# Patient Record
Sex: MALE | Race: WHITE | NOT HISPANIC OR LATINO | Employment: FULL TIME | URBAN - METROPOLITAN AREA
[De-identification: names, ages, dates, MRNs, and addresses within clinical notes are randomized per-mention and may not be internally consistent; named-entity substitution may affect disease eponyms.]

---

## 2018-04-24 PROBLEM — M51.9 LUMBAR DISC DISEASE: Status: ACTIVE | Noted: 2018-04-24

## 2018-04-24 PROBLEM — M50.90 CERVICAL DISC DISEASE: Status: ACTIVE | Noted: 2018-04-24

## 2018-04-24 PROBLEM — I10 ESSENTIAL HYPERTENSION: Status: ACTIVE | Noted: 2018-04-24

## 2019-12-02 PROBLEM — L72.3 SEBACEOUS CYST: Status: ACTIVE | Noted: 2019-12-02

## 2021-09-30 PROBLEM — F41.9 ANXIETY: Status: ACTIVE | Noted: 2021-09-30

## 2022-10-28 ENCOUNTER — TELEPHONE (OUTPATIENT)
Dept: PAIN MEDICINE | Facility: CLINIC | Age: 42
End: 2022-10-28
Payer: COMMERCIAL

## 2022-10-28 NOTE — TELEPHONE ENCOUNTER
Asked if pt wanted to schedule new pt appt from referral to see Dr. Bartholomew. Pt stated he did and appointment was made. All questions answered.

## 2022-10-28 NOTE — TELEPHONE ENCOUNTER
----- Message from Ginger Webster sent at 10/28/2022  9:35 AM CDT -----  Contact: called at [phone]  Type: Needs Medical Advice  Who Called: Nurse Juarez from the [facility]  Best Call Back Number: [phone]  Additional Information: She is calling to make an appt for the pt but couldn't get anyone on the line. Please call pt back to make an appt.

## 2022-12-06 ENCOUNTER — OFFICE VISIT (OUTPATIENT)
Dept: PAIN MEDICINE | Facility: CLINIC | Age: 42
End: 2022-12-06
Payer: COMMERCIAL

## 2022-12-06 VITALS
WEIGHT: 291.56 LBS | HEART RATE: 90 BPM | BODY MASS INDEX: 37.42 KG/M2 | DIASTOLIC BLOOD PRESSURE: 84 MMHG | HEIGHT: 74 IN | SYSTOLIC BLOOD PRESSURE: 121 MMHG

## 2022-12-06 DIAGNOSIS — M48.061 SPINAL STENOSIS OF LUMBAR REGION WITHOUT NEUROGENIC CLAUDICATION: Primary | ICD-10-CM

## 2022-12-06 DIAGNOSIS — M51.9 LUMBAR DISC DISEASE: ICD-10-CM

## 2022-12-06 DIAGNOSIS — M54.16 BILATERAL LUMBAR RADICULOPATHY: ICD-10-CM

## 2022-12-06 DIAGNOSIS — M54.16 LUMBAR RADICULOPATHY: ICD-10-CM

## 2022-12-06 DIAGNOSIS — M50.90 CERVICAL DISC DISEASE: ICD-10-CM

## 2022-12-06 PROCEDURE — 99999 PR PBB SHADOW E&M-EST. PATIENT-LVL V: CPT | Mod: PBBFAC,,, | Performed by: ANESTHESIOLOGY

## 2022-12-06 PROCEDURE — 3044F HG A1C LEVEL LT 7.0%: CPT | Mod: CPTII,S$GLB,, | Performed by: ANESTHESIOLOGY

## 2022-12-06 PROCEDURE — 1159F PR MEDICATION LIST DOCUMENTED IN MEDICAL RECORD: ICD-10-PCS | Mod: CPTII,S$GLB,, | Performed by: ANESTHESIOLOGY

## 2022-12-06 PROCEDURE — 99999 PR PBB SHADOW E&M-EST. PATIENT-LVL V: ICD-10-PCS | Mod: PBBFAC,,, | Performed by: ANESTHESIOLOGY

## 2022-12-06 PROCEDURE — 3079F DIAST BP 80-89 MM HG: CPT | Mod: CPTII,S$GLB,, | Performed by: ANESTHESIOLOGY

## 2022-12-06 PROCEDURE — 1159F MED LIST DOCD IN RCRD: CPT | Mod: CPTII,S$GLB,, | Performed by: ANESTHESIOLOGY

## 2022-12-06 PROCEDURE — 99204 PR OFFICE/OUTPT VISIT, NEW, LEVL IV, 45-59 MIN: ICD-10-PCS | Mod: S$GLB,,, | Performed by: ANESTHESIOLOGY

## 2022-12-06 PROCEDURE — 3079F PR MOST RECENT DIASTOLIC BLOOD PRESSURE 80-89 MM HG: ICD-10-PCS | Mod: CPTII,S$GLB,, | Performed by: ANESTHESIOLOGY

## 2022-12-06 PROCEDURE — 3008F BODY MASS INDEX DOCD: CPT | Mod: CPTII,S$GLB,, | Performed by: ANESTHESIOLOGY

## 2022-12-06 PROCEDURE — 4010F ACE/ARB THERAPY RXD/TAKEN: CPT | Mod: CPTII,S$GLB,, | Performed by: ANESTHESIOLOGY

## 2022-12-06 PROCEDURE — 99204 OFFICE O/P NEW MOD 45 MIN: CPT | Mod: S$GLB,,, | Performed by: ANESTHESIOLOGY

## 2022-12-06 PROCEDURE — 3074F PR MOST RECENT SYSTOLIC BLOOD PRESSURE < 130 MM HG: ICD-10-PCS | Mod: CPTII,S$GLB,, | Performed by: ANESTHESIOLOGY

## 2022-12-06 PROCEDURE — 3074F SYST BP LT 130 MM HG: CPT | Mod: CPTII,S$GLB,, | Performed by: ANESTHESIOLOGY

## 2022-12-06 PROCEDURE — 3044F PR MOST RECENT HEMOGLOBIN A1C LEVEL <7.0%: ICD-10-PCS | Mod: CPTII,S$GLB,, | Performed by: ANESTHESIOLOGY

## 2022-12-06 PROCEDURE — 4010F PR ACE/ARB THEARPY RXD/TAKEN: ICD-10-PCS | Mod: CPTII,S$GLB,, | Performed by: ANESTHESIOLOGY

## 2022-12-06 PROCEDURE — 3008F PR BODY MASS INDEX (BMI) DOCUMENTED: ICD-10-PCS | Mod: CPTII,S$GLB,, | Performed by: ANESTHESIOLOGY

## 2022-12-06 RX ORDER — ALPRAZOLAM 0.5 MG/1
1 TABLET, ORALLY DISINTEGRATING ORAL ONCE AS NEEDED
Status: CANCELLED | OUTPATIENT
Start: 2022-12-06 | End: 2034-05-04

## 2022-12-06 NOTE — H&P (VIEW-ONLY)
This note was completed with dictation software and grammatical errors may exist.    Chief Complaint   Patient presents with    Low-back Pain     C/o pain in lower back     Neck Pain     C/o pain in neck         HPI: Philip Salinas is a 42 y.o. year old male patient who has a past medical history of Cervical disc disease, Disc degeneration, lumbar, Disc disease, degenerative, cervical, Hypertension, and Lumbar disc disease. He presents in referral from Dr. Ana Valero for back and neck pain.  The patient reports that he 1st injured his neck in 2006, this pain is located the base of his neck but feels that at this time it is tolerable and it is his back that is bothering him the most. He states that he has had back pain for over 10 years, states that it is hereditary.He has seen other specialists and has had several MRIs over the years.  He has done physical therapy numerous times.  While the pain has been severe, it has especially worsened in the last year and especially since this past October.  He states that the pain is across the bilateral low back and radiates into the posterior thighs, calves and into the feet.  It can be either leg.  States that he is fine when he is sitting but as soon as he starts to stand up and walk the pain becomes severe.  States that if he leans forward on a shopping cart he gets relief.  He denies any tyler weakness, no bowel or bladder incontinence.  He states that he can only stand for about 5 minutes before the pain becomes severe and he needs to sit down.      Pain intervention history:  He had done what sounds like lumbar medial branch blocks in the past without relief.    Spine surgeries:    Antineuropathics:  Has tried gabapentin in the past without any relief.  NSAIDs:  Mobic 15 mg, ibuprofen 1200 mg at a time   Physical therapy:  Has done multiple rounds of physical therapy in the past without long term relief.  Antidepressants:  Cymbalta 30  Muscle  "relaxers:  Opioids:  Antiplatelets/Anticoagulants:        ROS:  Reports back pain, difficulty sleeping and anxiety.  Balance of review of systems is negative.    Lab Results   Component Value Date    HGBA1C 5.8 (H) 09/19/2022       Lab Results   Component Value Date    WBC 8.11 09/19/2022    HGB 15.6 09/19/2022    HCT 47.2 09/19/2022    MCV 93 09/19/2022     09/19/2022             Past Medical History:   Diagnosis Date    Cervical disc disease     Disc degeneration, lumbar     Disc disease, degenerative, cervical     Hypertension     Lumbar disc disease        Past Surgical History:   Procedure Laterality Date    cervical rhizotomy         Social History     Socioeconomic History    Marital status:     Number of children: 4   Occupational History    Occupation: supervisor      Comment: [employer]   Tobacco Use    Smoking status: Never    Smokeless tobacco: Never   Substance and Sexual Activity    Alcohol use: No    Drug use: No    Sexual activity: Yes     Partners: Female         Medications/Allergies: See med card    Vitals:    12/06/22 0949   BP: 121/84   Pulse: 90   Weight: 132.2 kg (291 lb 8.9 oz)   Height: 6' 2" (1.88 m)   PainSc:   8   PainLoc: Back     Body mass index is 37.43 kg/m².    Physical exam:    Gen: A and O x3, pleasant, well-groomed  Skin: No rashes or obvious lesions  HEENT: PERRLA, no obvious deformities on ears or in canals. Trachea midline.  CVS: Regular rate and rhythm, normal palpable pulses.  Resp:No increased work of breathing, symmetrical chest rise.  Abdomen: Soft, NT/ND.  Musculoskeletal: No antalgic gait.         Neuro:    Iliopsoas Quadriceps Knee  Flexion Tibialis  anterior Gastro- cnemius EHL   Lower: R 5/5 5/5 5/5 5/5 5/5 5/5    L 5/5 5/5 5/5 5/5 5/5 5/5      Left  Right    Patellar DTR 1+ 1+   Achilles DTR 0+ 0+                    Intact and symmetrical to light touch and pinprick in L1-S1 dermatomes bilaterally.    Lumbar spine:  Lumbar spine: ROM is mildly " reduced with flexion with slight increased pain, moderately reduced with extension with increased pain   Scottie's test causes no increased pain on either side.    Supine straight leg raise is negative bilaterally.    Internal and external rotation of the hip causes no increased pain on either side.  Myofascial exam: No tenderness to palpation across lumbar paraspinous muscles.      Imagin18 Xray L-spine:  Trace retrolisthesis of L4 on L5.  Alignment is otherwise within normal limits.  Vertebral body heights are maintained.  No acute fracture identified.  No high-grade osseous foraminal narrowing on oblique views.  Minor multilevel endplate changes.  Disc spaces are preserved.  No prevertebral soft tissue abnormality.    11 MRI C-spine:    1. MINIMAL C3-4 DISC BULGE WITH SEVERE LEFT AND MODERATE RIGHT FORAMINAL STENOSIS.     2. SEVERE BILATERAL FORAMINAL STENOSIS AT THE C4-5 LEVEL WHICH IS GREATER ON THE RIGHT THAN ON THE LEFT.     3. C5-6 MODERATE BILATERAL FORAMINAL STENOSIS.     4. MINIMAL C6-7 DISC BULGE WITH SEVERE LEFT AND MODERATE RIGHT FORAMINAL STENOSIS.     5. MILD BILATERAL FORAMINAL STENOSIS AT THE C7-T1 LEVEL.       X-ray lumbar spine 22:  There is grade 1 retrolisthesis of L2 on L3, L3 on L4 and L4 on L5.    MRI lumbar spine 2022:  Report only.  The lumbar spine canal demonstrates narrowed appearance likely on a developmental basis.  There is prominent epidural fat throughout the lumbar spine.  L1/2 no canal stenosis.  L2/3 there is mild disc desiccation and disc bulging, mild facet arthrosis.  There is mild bilateral neuroforaminal narrowing at this level.  At L3/4 there is a moderate broad-based disc bulge with mild-to-moderate bilateral facet arthrosis.  There is also slightly prominent epidural fat which contributes to thecal sac narrowing.  There is mild bilateral foraminal narrowing and 6-7 millimeter canal.  At L4/5 there is broad-based disc bulge ligamentum hypertrophy,  prominent epidural fat and moderate bilateral facet arthropathy.  The AP diameter the central thecal sac is 8 millimeters.  There is moderate lateral recess narrowing and left greater than right neural foraminal narrowing.  At L5/S1 there is disc space narrowing, disc bulging superimposed left paracentral annular fissure, ligamentous hypertrophy and severe left greater than right facet hypertrophy.    Assessment:  Philip Salinas is a 42 y.o. year old male patient who has a past medical history of Cervical disc disease, Disc degeneration, lumbar, Disc disease, degenerative, cervical, Hypertension, and Lumbar disc disease. He presents in referral from Dr. Ana Valero for back and neck pain.     1. Spinal stenosis of lumbar region without neurogenic claudication  Ambulatory referral/consult to Physical/Occupational Therapy      2. Lumbar disc disease  Ambulatory referral/consult to Pain Clinic      3. Cervical disc disease  Ambulatory referral/consult to Pain Clinic      4. Bilateral lumbar radiculopathy  Ambulatory referral/consult to Physical/Occupational Therapy    Vital signs    Verify informed consent    Notify physician     Notify physician     Notify physician (specify)    Diet NPO    Case Request Operating Room: Injection-steroid-epidural-lumbar L5/S1    Place in Outpatient    alprazolam ODT dissolvable tablet 1 mg      5. Lumbar radiculopathy            Plan:  1.  We reviewed his symptoms, reviewed the report of his imaging and it sounds like he actually has central stenosis which is rare considering his age.  Unfortunately he has all the signs of stenosis and is starting to get some of the neurogenic claudication type symptoms.  He has no signs of weakness at this point.  I am going to set him up with physical therapy to work on core strengthening, he is currently working on weight loss which we discussed could be helpful.  I am also going to set him up with an L5/S1 AMADEO to help provide some relief so  he is able to stand and walk better which will help him in physical therapy.  I will have him follow up in several weeks after the injection or sooner as needed.  2.  We discussed decreasing the amount of ibuprofen he has been taking, has taken a fairly large dose, denies having any issues with gastritis or reflux for now.      Thank you for referring this interesting patient, and I look forward to continuing to collaborate in his care.

## 2022-12-21 ENCOUNTER — HOSPITAL ENCOUNTER (OUTPATIENT)
Dept: RADIOLOGY | Facility: HOSPITAL | Age: 42
Discharge: HOME | End: 2022-12-21
Attending: ANESTHESIOLOGY | Admitting: ANESTHESIOLOGY
Payer: COMMERCIAL

## 2022-12-21 ENCOUNTER — HOSPITAL ENCOUNTER (OUTPATIENT)
Facility: HOSPITAL | Age: 42
Discharge: HOME | End: 2022-12-21
Attending: ANESTHESIOLOGY | Admitting: ANESTHESIOLOGY
Payer: COMMERCIAL

## 2022-12-21 DIAGNOSIS — M54.50 LOWER BACK PAIN: ICD-10-CM

## 2022-12-21 DIAGNOSIS — M54.16 BILATERAL LUMBAR RADICULOPATHY: ICD-10-CM

## 2022-12-21 DIAGNOSIS — M54.16 LUMBAR RADICULOPATHY: ICD-10-CM

## 2022-12-21 PROCEDURE — 62323 PR INJ LUMBAR/SACRAL, W/IMAGING GUIDANCE: ICD-10-PCS | Mod: ,,, | Performed by: ANESTHESIOLOGY

## 2022-12-21 PROCEDURE — 62323 NJX INTERLAMINAR LMBR/SAC: CPT | Mod: PO | Performed by: ANESTHESIOLOGY

## 2022-12-21 PROCEDURE — 25500020 PHARM REV CODE 255: Mod: PO | Performed by: ANESTHESIOLOGY

## 2022-12-21 PROCEDURE — 76000 FLUOROSCOPY <1 HR PHYS/QHP: CPT | Mod: TC,PO

## 2022-12-21 PROCEDURE — A4216 STERILE WATER/SALINE, 10 ML: HCPCS | Mod: PO | Performed by: ANESTHESIOLOGY

## 2022-12-21 PROCEDURE — 62323 NJX INTERLAMINAR LMBR/SAC: CPT | Mod: ,,, | Performed by: ANESTHESIOLOGY

## 2022-12-21 PROCEDURE — 63600175 PHARM REV CODE 636 W HCPCS: Mod: PO | Performed by: ANESTHESIOLOGY

## 2022-12-21 PROCEDURE — 25000003 PHARM REV CODE 250: Mod: PO | Performed by: ANESTHESIOLOGY

## 2022-12-21 RX ORDER — ALPRAZOLAM 0.5 MG/1
1 TABLET, ORALLY DISINTEGRATING ORAL ONCE AS NEEDED
Status: COMPLETED | OUTPATIENT
Start: 2022-12-21 | End: 2022-12-21

## 2022-12-21 RX ORDER — LIDOCAINE HYDROCHLORIDE 10 MG/ML
INJECTION, SOLUTION EPIDURAL; INFILTRATION; INTRACAUDAL; PERINEURAL
Status: DISCONTINUED | OUTPATIENT
Start: 2022-12-21 | End: 2022-12-21 | Stop reason: HOSPADM

## 2022-12-21 RX ORDER — SODIUM CHLORIDE 9 MG/ML
INJECTION, SOLUTION INTRAMUSCULAR; INTRAVENOUS; SUBCUTANEOUS
Status: DISCONTINUED | OUTPATIENT
Start: 2022-12-21 | End: 2022-12-21 | Stop reason: HOSPADM

## 2022-12-21 RX ORDER — METHYLPREDNISOLONE ACETATE 80 MG/ML
INJECTION, SUSPENSION INTRA-ARTICULAR; INTRALESIONAL; INTRAMUSCULAR; SOFT TISSUE
Status: DISCONTINUED | OUTPATIENT
Start: 2022-12-21 | End: 2022-12-21 | Stop reason: HOSPADM

## 2022-12-21 RX ADMIN — ALPRAZOLAM 1 MG: 0.5 TABLET, ORALLY DISINTEGRATING ORAL at 03:12

## 2022-12-21 NOTE — OP NOTE

## 2022-12-21 NOTE — DISCHARGE SUMMARY
[facility] - [facility]  Discharge Note  Short Stay    Procedure(s) (LRB):  Injection-steroid-epidural-lumbar L5/S1 (N/A)      OUTCOME: Patient tolerated treatment/procedure well without complication and is now ready for discharge.    DISPOSITION: Home or Self Care    FINAL DIAGNOSIS:  Lumbar radiculopathy    FOLLOWUP: In clinic    DISCHARGE INSTRUCTIONS:    Discharge Procedure Orders   Diet Adult Regular     No dressing needed     Notify your health care provider if you experience any of the following:  temperature >100.4     Activity as tolerated

## 2022-12-22 VITALS
BODY MASS INDEX: 38.24 KG/M2 | OXYGEN SATURATION: 96 % | TEMPERATURE: 98 F | HEART RATE: 78 BPM | SYSTOLIC BLOOD PRESSURE: 128 MMHG | DIASTOLIC BLOOD PRESSURE: 84 MMHG | HEIGHT: 74 IN | WEIGHT: 298 LBS | RESPIRATION RATE: 16 BRPM

## 2023-01-19 ENCOUNTER — OFFICE VISIT (OUTPATIENT)
Dept: PAIN MEDICINE | Facility: CLINIC | Age: 43
End: 2023-01-19
Payer: COMMERCIAL

## 2023-01-19 ENCOUNTER — TELEPHONE (OUTPATIENT)
Dept: PAIN MEDICINE | Facility: CLINIC | Age: 43
End: 2023-01-19
Payer: COMMERCIAL

## 2023-01-19 VITALS
BODY MASS INDEX: 38.07 KG/M2 | HEART RATE: 75 BPM | WEIGHT: 296.63 LBS | SYSTOLIC BLOOD PRESSURE: 137 MMHG | DIASTOLIC BLOOD PRESSURE: 67 MMHG | HEIGHT: 74 IN

## 2023-01-19 DIAGNOSIS — M54.16 LUMBAR RADICULOPATHY: Primary | ICD-10-CM

## 2023-01-19 DIAGNOSIS — M48.061 SPINAL STENOSIS OF LUMBAR REGION WITHOUT NEUROGENIC CLAUDICATION: ICD-10-CM

## 2023-01-19 DIAGNOSIS — M51.36 DDD (DEGENERATIVE DISC DISEASE), LUMBAR: ICD-10-CM

## 2023-01-19 DIAGNOSIS — M47.816 LUMBAR SPONDYLOSIS: ICD-10-CM

## 2023-01-19 PROCEDURE — 99214 PR OFFICE/OUTPT VISIT, EST, LEVL IV, 30-39 MIN: ICD-10-PCS | Mod: S$GLB,,, | Performed by: PHYSICIAN ASSISTANT

## 2023-01-19 PROCEDURE — 99999 PR PBB SHADOW E&M-EST. PATIENT-LVL III: CPT | Mod: PBBFAC,,, | Performed by: PHYSICIAN ASSISTANT

## 2023-01-19 PROCEDURE — 99999 PR PBB SHADOW E&M-EST. PATIENT-LVL III: ICD-10-PCS | Mod: PBBFAC,,, | Performed by: PHYSICIAN ASSISTANT

## 2023-01-19 PROCEDURE — 3008F BODY MASS INDEX DOCD: CPT | Mod: CPTII,S$GLB,, | Performed by: PHYSICIAN ASSISTANT

## 2023-01-19 PROCEDURE — 1159F MED LIST DOCD IN RCRD: CPT | Mod: CPTII,S$GLB,, | Performed by: PHYSICIAN ASSISTANT

## 2023-01-19 PROCEDURE — 1160F PR REVIEW ALL MEDS BY PRESCRIBER/CLIN PHARMACIST DOCUMENTED: ICD-10-PCS | Mod: CPTII,S$GLB,, | Performed by: PHYSICIAN ASSISTANT

## 2023-01-19 PROCEDURE — 1160F RVW MEDS BY RX/DR IN RCRD: CPT | Mod: CPTII,S$GLB,, | Performed by: PHYSICIAN ASSISTANT

## 2023-01-19 PROCEDURE — 3008F PR BODY MASS INDEX (BMI) DOCUMENTED: ICD-10-PCS | Mod: CPTII,S$GLB,, | Performed by: PHYSICIAN ASSISTANT

## 2023-01-19 PROCEDURE — 99214 OFFICE O/P EST MOD 30 MIN: CPT | Mod: S$GLB,,, | Performed by: PHYSICIAN ASSISTANT

## 2023-01-19 PROCEDURE — 3078F PR MOST RECENT DIASTOLIC BLOOD PRESSURE < 80 MM HG: ICD-10-PCS | Mod: CPTII,S$GLB,, | Performed by: PHYSICIAN ASSISTANT

## 2023-01-19 PROCEDURE — 1159F PR MEDICATION LIST DOCUMENTED IN MEDICAL RECORD: ICD-10-PCS | Mod: CPTII,S$GLB,, | Performed by: PHYSICIAN ASSISTANT

## 2023-01-19 PROCEDURE — 3078F DIAST BP <80 MM HG: CPT | Mod: CPTII,S$GLB,, | Performed by: PHYSICIAN ASSISTANT

## 2023-01-19 PROCEDURE — 3075F SYST BP GE 130 - 139MM HG: CPT | Mod: CPTII,S$GLB,, | Performed by: PHYSICIAN ASSISTANT

## 2023-01-19 PROCEDURE — 3075F PR MOST RECENT SYSTOLIC BLOOD PRESS GE 130-139MM HG: ICD-10-PCS | Mod: CPTII,S$GLB,, | Performed by: PHYSICIAN ASSISTANT

## 2023-01-19 RX ORDER — NABUMETONE 750 MG/1
750 TABLET, FILM COATED ORAL 2 TIMES DAILY PRN
Qty: 60 TABLET | Refills: 2 | Status: SHIPPED | OUTPATIENT
Start: 2023-01-19 | End: 2023-03-13

## 2023-01-19 RX ORDER — ALPRAZOLAM 0.25 MG/1
1 TABLET ORAL ONCE
Status: CANCELLED | OUTPATIENT
Start: 2023-01-19 | End: 2023-01-19

## 2023-01-19 NOTE — TELEPHONE ENCOUNTER
M54.16 Lumbar radiculopathy       Procedure -> Transforaminal Injection (Specify level and laterality) Cmt:                  Bilateral L3/4, local          Physician -> Puma           Is patient on anti-coagulants? -> Yes    Cmt: NSAIDS          Pre-op Diagnosis -> Lumbar radiculopathy           Facility Name: -> [facility]           Follow-up: -> 4 weeks    Will give pt time to get home then call to schedule

## 2023-01-19 NOTE — H&P (VIEW-ONLY)
This note was completed with dictation software and grammatical errors may exist.    Chief Complaint   Patient presents with    Low-back Pain     C/o pain in lower left back         HPI: Philip Salinas is a 42 y.o. year old male patient who has a past medical history of Cervical disc disease, Disc degeneration, lumbar, Disc disease, degenerative, cervical, Hypertension, and Lumbar disc disease. He presents in referral from No ref. provider found for back and neck pain.  He is status post L5/S1 interlaminar epidural steroid injection on 12/21/2022 with 0% relief.  The patient is new to me.  He complains of bilateral low back pain radiating to the left groin, bilateral buttocks, posterior thighs, calves and great toes.  He states he feels like his back is breaking.  He states that the pain alternates sides or can also be bilateral.  His pain is much worse with standing and improved with sitting.  He does his home exercise program that he learned in physical therapy.  He complains of lower extremity numbness but denies weakness.    Initial history: The patient reports that he 1st injured his neck in 2006, this pain is located the base of his neck but feels that at this time it is tolerable and it is his back that is bothering him the most. He states that he has had back pain for over 10 years, states that it is hereditary.He has seen other specialists and has had several MRIs over the years.  He has done physical therapy numerous times.  While the pain has been severe, it has especially worsened in the last year and especially since this past October.  He states that the pain is across the bilateral low back and radiates into the posterior thighs, calves and into the feet.  It can be either leg.  States that he is fine when he is sitting but as soon as he starts to stand up and walk the pain becomes severe.  States that if he leans forward on a shopping cart he gets relief.  He denies any tyler weakness, no bowel or  bladder incontinence.  He states that he can only stand for about 5 minutes before the pain becomes severe and he needs to sit down.    Pain intervention history:  He had done what sounds like lumbar medial branch blocks in the past without relief.He is status post L5/S1 interlaminar epidural steroid injection on 12/21/2022 with 0% relief.     Spine surgeries:    Antineuropathics:  Has tried gabapentin in the past without any relief.  NSAIDs:  Mobic 15 mg, ibuprofen 1200 mg at a time   Physical therapy:  Has done multiple rounds of physical therapy in the past without long term relief.  Antidepressants:  Cymbalta 30  Muscle relaxers:  Opioids:  Antiplatelets/Anticoagulants:    ROS:  Reports back pain, difficulty sleeping and anxiety.  Balance of review of systems is negative.    Lab Results   Component Value Date    HGBA1C 5.8 (H) 09/19/2022       Lab Results   Component Value Date    WBC 8.11 09/19/2022    HGB 15.6 09/19/2022    HCT 47.2 09/19/2022    MCV 93 09/19/2022     09/19/2022             Past Medical History:   Diagnosis Date    Cervical disc disease     Disc degeneration, lumbar     Disc disease, degenerative, cervical     Hypertension     Lumbar disc disease        Past Surgical History:   Procedure Laterality Date    cervical rhizotomy      EPIDURAL STEROID INJECTION INTO LUMBAR SPINE N/A 12/21/2022    Procedure: Injection-steroid-epidural-lumbar L5/S1;  Surgeon: Jared Bartholomew MD;  Location: [facility];  Service: Pain Management;  Laterality: N/A;       Social History     Socioeconomic History    Marital status:     Number of children: 4   Occupational History    Occupation: supervisor      Comment: [employer]   Tobacco Use    Smoking status: Never    Smokeless tobacco: Never   Substance and Sexual Activity    Alcohol use: No    Drug use: No    Sexual activity: Yes     Partners: Female         Medications/Allergies: See med card    Vitals:    01/19/23 0736   BP: 137/67   Pulse: 75  "  Weight: 134.5 kg (296 lb 10.1 oz)   Height: 6' 2" (1.88 m)   PainSc:   7   PainLoc: Back     Body mass index is 38.08 kg/m².    Physical exam:    Gen: A and O x3, pleasant, well-groomed  Skin: No rashes or obvious lesions  HEENT: PERRLA, no obvious deformities on ears or in canals. Trachea midline.  CVS: Regular rate and rhythm, normal palpable pulses.  Resp:No increased work of breathing, symmetrical chest rise.  Abdomen: Soft, NT/ND.  Musculoskeletal: No antalgic gait.         Neuro:    Iliopsoas Quadriceps Knee  Flexion Tibialis  anterior Gastro- cnemius EHL   Lower: R 5/5 5/5 5/5 5/5 5/5 5/5    L 5/5 5/5 5/5 5/5 5/5 5/5      Left  Right    Patellar DTR 1+ 1+   Achilles DTR 0+ 0+                    Intact and symmetrical to light touch and pinprick in L1-S1 dermatomes bilaterally.    Lumbar spine:  Lumbar spine: ROM is mildly reduced with flexion with slight increased pain, moderately reduced with extension with increased pain   Scottie's test causes no increased pain on either side.    Supine straight leg raise is negative bilaterally.    Internal and external rotation of the hip causes no increased pain on either side.  Myofascial exam: No tenderness to palpation across lumbar paraspinous muscles.      Imagin18 Xray L-spine:  Trace retrolisthesis of L4 on L5.  Alignment is otherwise within normal limits.  Vertebral body heights are maintained.  No acute fracture identified.  No high-grade osseous foraminal narrowing on oblique views.  Minor multilevel endplate changes.  Disc spaces are preserved.  No prevertebral soft tissue abnormality.    11 MRI C-spine:    1. MINIMAL C3-4 DISC BULGE WITH SEVERE LEFT AND MODERATE RIGHT FORAMINAL STENOSIS.     2. SEVERE BILATERAL FORAMINAL STENOSIS AT THE C4-5 LEVEL WHICH IS GREATER ON THE RIGHT THAN ON THE LEFT.     3. C5-6 MODERATE BILATERAL FORAMINAL STENOSIS.     4. MINIMAL C6-7 DISC BULGE WITH SEVERE LEFT AND MODERATE RIGHT FORAMINAL STENOSIS.     5. MILD " BILATERAL FORAMINAL STENOSIS AT THE C7-T1 LEVEL.       X-ray lumbar spine 1/28/22:  There is grade 1 retrolisthesis of L2 on L3, L3 on L4 and L4 on L5.    MRI lumbar spine 01/28/2022:  Report only.  The lumbar spine canal demonstrates narrowed appearance likely on a developmental basis.  There is prominent epidural fat throughout the lumbar spine.  L1/2 no canal stenosis.  L2/3 there is mild disc desiccation and disc bulging, mild facet arthrosis.  There is mild bilateral neuroforaminal narrowing at this level.  At L3/4 there is a moderate broad-based disc bulge with mild-to-moderate bilateral facet arthrosis.  There is also slightly prominent epidural fat which contributes to thecal sac narrowing.  There is mild bilateral foraminal narrowing and 6-7 millimeter canal.  At L4/5 there is broad-based disc bulge ligamentum hypertrophy, prominent epidural fat and moderate bilateral facet arthropathy.  The AP diameter the central thecal sac is 8 millimeters.  There is moderate lateral recess narrowing and left greater than right neural foraminal narrowing.  At L5/S1 there is disc space narrowing, disc bulging superimposed left paracentral annular fissure, ligamentous hypertrophy and severe left greater than right facet hypertrophy.    Assessment:  Philip Salinas is a 42 y.o. year old male patient who has a past medical history of Cervical disc disease, Disc degeneration, lumbar, Disc disease, degenerative, cervical, Hypertension, and Lumbar disc disease. He presents in referral from Dr. Ana Valero for back and neck pain.     1. Lumbar radiculopathy  Procedure Order to Pain Management      2. Spinal stenosis of lumbar region without neurogenic claudication        3. Lumbar spondylosis        4. DDD (degenerative disc disease), lumbar            Plan:  1. Unfortunately he did not have relief with the interlaminar injection we discussed trying a different approach.  We reviewed his lumbar spine MRI report in his  worst stenosis is at L3/4 so I will schedule her for bilateral L3/4 transforaminal epidural steroid injections.  I asked him to bring his MRI disc so we can uploaded into our system as well.    2. I provided a prescription for Relafen.    3. Follow-up in 4 weeks postprocedure sooner as needed.

## 2023-01-19 NOTE — PROGRESS NOTES
This note was completed with dictation software and grammatical errors may exist.    Chief Complaint   Patient presents with    Low-back Pain     C/o pain in lower left back         HPI: Philip Salinas is a 42 y.o. year old male patient who has a past medical history of Cervical disc disease, Disc degeneration, lumbar, Disc disease, degenerative, cervical, Hypertension, and Lumbar disc disease. He presents in referral from No ref. provider found for back and neck pain.  He is status post L5/S1 interlaminar epidural steroid injection on 12/21/2022 with 0% relief.  The patient is new to me.  He complains of bilateral low back pain radiating to the left groin, bilateral buttocks, posterior thighs, calves and great toes.  He states he feels like his back is breaking.  He states that the pain alternates sides or can also be bilateral.  His pain is much worse with standing and improved with sitting.  He does his home exercise program that he learned in physical therapy.  He complains of lower extremity numbness but denies weakness.    Initial history: The patient reports that he 1st injured his neck in 2006, this pain is located the base of his neck but feels that at this time it is tolerable and it is his back that is bothering him the most. He states that he has had back pain for over 10 years, states that it is hereditary.He has seen other specialists and has had several MRIs over the years.  He has done physical therapy numerous times.  While the pain has been severe, it has especially worsened in the last year and especially since this past October.  He states that the pain is across the bilateral low back and radiates into the posterior thighs, calves and into the feet.  It can be either leg.  States that he is fine when he is sitting but as soon as he starts to stand up and walk the pain becomes severe.  States that if he leans forward on a shopping cart he gets relief.  He denies any tyler weakness, no bowel or  bladder incontinence.  He states that he can only stand for about 5 minutes before the pain becomes severe and he needs to sit down.    Pain intervention history:  He had done what sounds like lumbar medial branch blocks in the past without relief.He is status post L5/S1 interlaminar epidural steroid injection on 12/21/2022 with 0% relief.     Spine surgeries:    Antineuropathics:  Has tried gabapentin in the past without any relief.  NSAIDs:  Mobic 15 mg, ibuprofen 1200 mg at a time   Physical therapy:  Has done multiple rounds of physical therapy in the past without long term relief.  Antidepressants:  Cymbalta 30  Muscle relaxers:  Opioids:  Antiplatelets/Anticoagulants:    ROS:  Reports back pain, difficulty sleeping and anxiety.  Balance of review of systems is negative.    Lab Results   Component Value Date    HGBA1C 5.8 (H) 09/19/2022       Lab Results   Component Value Date    WBC 8.11 09/19/2022    HGB 15.6 09/19/2022    HCT 47.2 09/19/2022    MCV 93 09/19/2022     09/19/2022             Past Medical History:   Diagnosis Date    Cervical disc disease     Disc degeneration, lumbar     Disc disease, degenerative, cervical     Hypertension     Lumbar disc disease        Past Surgical History:   Procedure Laterality Date    cervical rhizotomy      EPIDURAL STEROID INJECTION INTO LUMBAR SPINE N/A 12/21/2022    Procedure: Injection-steroid-epidural-lumbar L5/S1;  Surgeon: Jared Bartholomew MD;  Location: [facility];  Service: Pain Management;  Laterality: N/A;       Social History     Socioeconomic History    Marital status:     Number of children: 4   Occupational History    Occupation: supervisor      Comment: [employer]   Tobacco Use    Smoking status: Never    Smokeless tobacco: Never   Substance and Sexual Activity    Alcohol use: No    Drug use: No    Sexual activity: Yes     Partners: Female         Medications/Allergies: See med card    Vitals:    01/19/23 0736   BP: 137/67   Pulse: 75  "  Weight: 134.5 kg (296 lb 10.1 oz)   Height: 6' 2" (1.88 m)   PainSc:   7   PainLoc: Back     Body mass index is 38.08 kg/m².    Physical exam:    Gen: A and O x3, pleasant, well-groomed  Skin: No rashes or obvious lesions  HEENT: PERRLA, no obvious deformities on ears or in canals. Trachea midline.  CVS: Regular rate and rhythm, normal palpable pulses.  Resp:No increased work of breathing, symmetrical chest rise.  Abdomen: Soft, NT/ND.  Musculoskeletal: No antalgic gait.         Neuro:    Iliopsoas Quadriceps Knee  Flexion Tibialis  anterior Gastro- cnemius EHL   Lower: R 5/5 5/5 5/5 5/5 5/5 5/5    L 5/5 5/5 5/5 5/5 5/5 5/5      Left  Right    Patellar DTR 1+ 1+   Achilles DTR 0+ 0+                    Intact and symmetrical to light touch and pinprick in L1-S1 dermatomes bilaterally.    Lumbar spine:  Lumbar spine: ROM is mildly reduced with flexion with slight increased pain, moderately reduced with extension with increased pain   Scottie's test causes no increased pain on either side.    Supine straight leg raise is negative bilaterally.    Internal and external rotation of the hip causes no increased pain on either side.  Myofascial exam: No tenderness to palpation across lumbar paraspinous muscles.      Imagin18 Xray L-spine:  Trace retrolisthesis of L4 on L5.  Alignment is otherwise within normal limits.  Vertebral body heights are maintained.  No acute fracture identified.  No high-grade osseous foraminal narrowing on oblique views.  Minor multilevel endplate changes.  Disc spaces are preserved.  No prevertebral soft tissue abnormality.    11 MRI C-spine:    1. MINIMAL C3-4 DISC BULGE WITH SEVERE LEFT AND MODERATE RIGHT FORAMINAL STENOSIS.     2. SEVERE BILATERAL FORAMINAL STENOSIS AT THE C4-5 LEVEL WHICH IS GREATER ON THE RIGHT THAN ON THE LEFT.     3. C5-6 MODERATE BILATERAL FORAMINAL STENOSIS.     4. MINIMAL C6-7 DISC BULGE WITH SEVERE LEFT AND MODERATE RIGHT FORAMINAL STENOSIS.     5. MILD " BILATERAL FORAMINAL STENOSIS AT THE C7-T1 LEVEL.       X-ray lumbar spine 1/28/22:  There is grade 1 retrolisthesis of L2 on L3, L3 on L4 and L4 on L5.    MRI lumbar spine 01/28/2022:  Report only.  The lumbar spine canal demonstrates narrowed appearance likely on a developmental basis.  There is prominent epidural fat throughout the lumbar spine.  L1/2 no canal stenosis.  L2/3 there is mild disc desiccation and disc bulging, mild facet arthrosis.  There is mild bilateral neuroforaminal narrowing at this level.  At L3/4 there is a moderate broad-based disc bulge with mild-to-moderate bilateral facet arthrosis.  There is also slightly prominent epidural fat which contributes to thecal sac narrowing.  There is mild bilateral foraminal narrowing and 6-7 millimeter canal.  At L4/5 there is broad-based disc bulge ligamentum hypertrophy, prominent epidural fat and moderate bilateral facet arthropathy.  The AP diameter the central thecal sac is 8 millimeters.  There is moderate lateral recess narrowing and left greater than right neural foraminal narrowing.  At L5/S1 there is disc space narrowing, disc bulging superimposed left paracentral annular fissure, ligamentous hypertrophy and severe left greater than right facet hypertrophy.    Assessment:  Philip Salinas is a 42 y.o. year old male patient who has a past medical history of Cervical disc disease, Disc degeneration, lumbar, Disc disease, degenerative, cervical, Hypertension, and Lumbar disc disease. He presents in referral from Dr. Ana Valero for back and neck pain.     1. Lumbar radiculopathy  Procedure Order to Pain Management      2. Spinal stenosis of lumbar region without neurogenic claudication        3. Lumbar spondylosis        4. DDD (degenerative disc disease), lumbar            Plan:  1. Unfortunately he did not have relief with the interlaminar injection we discussed trying a different approach.  We reviewed his lumbar spine MRI report in his  worst stenosis is at L3/4 so I will schedule her for bilateral L3/4 transforaminal epidural steroid injections.  I asked him to bring his MRI disc so we can uploaded into our system as well.    2. I provided a prescription for Relafen.    3. Follow-up in 4 weeks postprocedure sooner as needed.

## 2023-02-03 ENCOUNTER — HOSPITAL ENCOUNTER (OUTPATIENT)
Facility: HOSPITAL | Age: 43
Discharge: HOME | End: 2023-02-03
Attending: ANESTHESIOLOGY | Admitting: ANESTHESIOLOGY
Payer: COMMERCIAL

## 2023-02-03 ENCOUNTER — HOSPITAL ENCOUNTER (OUTPATIENT)
Dept: RADIOLOGY | Facility: HOSPITAL | Age: 43
Discharge: HOME | End: 2023-02-03
Attending: ANESTHESIOLOGY | Admitting: ANESTHESIOLOGY
Payer: COMMERCIAL

## 2023-02-03 VITALS
BODY MASS INDEX: 37.22 KG/M2 | OXYGEN SATURATION: 99 % | DIASTOLIC BLOOD PRESSURE: 81 MMHG | WEIGHT: 290 LBS | TEMPERATURE: 98 F | SYSTOLIC BLOOD PRESSURE: 120 MMHG | HEART RATE: 71 BPM | RESPIRATION RATE: 16 BRPM | HEIGHT: 74 IN

## 2023-02-03 DIAGNOSIS — M54.16 LUMBAR RADICULOPATHY: ICD-10-CM

## 2023-02-03 DIAGNOSIS — M54.50 LOWER BACK PAIN: ICD-10-CM

## 2023-02-03 PROCEDURE — 64483 PR EPIDURAL INJ, ANES/STEROID, TRANSFORAMINAL, LUMB/SACR, SNGL LEVL: ICD-10-PCS | Mod: 50,,, | Performed by: ANESTHESIOLOGY

## 2023-02-03 PROCEDURE — 64483 NJX AA&/STRD TFRM EPI L/S 1: CPT | Mod: 50,PO | Performed by: ANESTHESIOLOGY

## 2023-02-03 PROCEDURE — 25500020 PHARM REV CODE 255: Mod: PO | Performed by: ANESTHESIOLOGY

## 2023-02-03 PROCEDURE — 25000003 PHARM REV CODE 250: Mod: PO | Performed by: ANESTHESIOLOGY

## 2023-02-03 PROCEDURE — 64483 NJX AA&/STRD TFRM EPI L/S 1: CPT | Mod: 50,,, | Performed by: ANESTHESIOLOGY

## 2023-02-03 PROCEDURE — 76000 FLUOROSCOPY <1 HR PHYS/QHP: CPT | Mod: TC,PO

## 2023-02-03 PROCEDURE — 63600175 PHARM REV CODE 636 W HCPCS: Mod: PO | Performed by: ANESTHESIOLOGY

## 2023-02-03 RX ORDER — BUPIVACAINE HYDROCHLORIDE 2.5 MG/ML
INJECTION, SOLUTION EPIDURAL; INFILTRATION; INTRACAUDAL
Status: DISCONTINUED | OUTPATIENT
Start: 2023-02-03 | End: 2023-02-03 | Stop reason: HOSPADM

## 2023-02-03 RX ORDER — ALPRAZOLAM 0.5 MG/1
1 TABLET, ORALLY DISINTEGRATING ORAL ONCE
Status: COMPLETED | OUTPATIENT
Start: 2023-02-03 | End: 2023-02-03

## 2023-02-03 RX ORDER — METHYLPREDNISOLONE ACETATE 80 MG/ML
INJECTION, SUSPENSION INTRA-ARTICULAR; INTRALESIONAL; INTRAMUSCULAR; SOFT TISSUE
Status: DISCONTINUED | OUTPATIENT
Start: 2023-02-03 | End: 2023-02-03 | Stop reason: HOSPADM

## 2023-02-03 RX ORDER — ALPRAZOLAM 0.5 MG/1
1 TABLET ORAL ONCE
Status: DISCONTINUED | OUTPATIENT
Start: 2023-02-03 | End: 2023-02-03

## 2023-02-03 RX ORDER — LIDOCAINE HYDROCHLORIDE 10 MG/ML
INJECTION, SOLUTION EPIDURAL; INFILTRATION; INTRACAUDAL; PERINEURAL
Status: DISCONTINUED | OUTPATIENT
Start: 2023-02-03 | End: 2023-02-03 | Stop reason: HOSPADM

## 2023-02-03 RX ORDER — HYDROCODONE BITARTRATE AND ACETAMINOPHEN 5; 325 MG/1; MG/1
1 TABLET ORAL EVERY 8 HOURS PRN
Qty: 21 TABLET | Refills: 0 | Status: SHIPPED | OUTPATIENT
Start: 2023-02-03 | End: 2023-03-13

## 2023-02-03 RX ADMIN — ALPRAZOLAM 1 MG: 0.5 TABLET, ORALLY DISINTEGRATING ORAL at 02:02

## 2023-02-03 NOTE — OP NOTE
PROCEDURE DATE: 2/3/2023    PROCEDURE: Bilateral L3/4 transforaminal epidural steroid injection under fluoroscopy    DIAGNOSIS: Lumbar  Radiculopathy    Post op diagnosis: Same    PHYSICIAN: Jared Bartholomew MD    MEDICATIONS INJECTED:  Methylprednisolone 40mg (1ml) and 1ml 0.25% bupivicaine at each nerve root.     LOCAL ANESTHETIC INJECTED:  Lidocaine 1%. 4 ml per site.    SEDATION MEDICATIONS: none    ESTIMATED BLOOD LOSS:  none    COMPLICATIONS:  none    TECHNIQUE:   A time-out was taken to identify patient and procedure side prior to starting the procedure. The patient was placed in a prone position, prepped and draped in the usual sterile fashion using ChloraPrep and sterile towels.  The area to be injected was determined under fluoroscopic guidance in AP and oblique view.  Local anesthetic was given by raising a wheal and going down to the hub of a 25-gauge 1.5 inch needle.  In oblique view, a 5 inch 22-gauge bent-tip spinal needle was introduced towards 6 oclock position of the pedicle of each above named nerve root level.  The needle was walked medially then hinged into the neural foramen and position was confirmed in AP and lateral views.  Omnipaque contrast dye was injected to confirm appropriate placement and that there was no vascular uptake.  After negative aspiration for blood or CSF, the medication was then injected. This was performed at the right and then left L3/4 level(s). The patient tolerated the procedure well.    The patient was monitored after the procedure.  Patient was given post procedure and discharge instructions to follow at home. The patient was discharged in a stable condition.

## 2023-02-03 NOTE — DISCHARGE SUMMARY
[place] - Surgery  Discharge Note  Short Stay    Procedure(s) (LRB):  Injection,steroid,epidural,transforaminal approach L3/4 (Bilateral)      OUTCOME: Patient tolerated treatment/procedure well without complication and is now ready for discharge.    DISPOSITION: Home or Self Care    FINAL DIAGNOSIS:  Lumbar radiculopathy    FOLLOWUP: In clinic    DISCHARGE INSTRUCTIONS:    Discharge Procedure Orders   Diet Adult Regular     No dressing needed     Notify your health care provider if you experience any of the following:  temperature >100.4     Activity as tolerated

## 2023-02-22 ENCOUNTER — TELEPHONE (OUTPATIENT)
Dept: PAIN MEDICINE | Facility: CLINIC | Age: 43
End: 2023-02-22
Payer: COMMERCIAL

## 2023-02-22 NOTE — TELEPHONE ENCOUNTER
Called patient to reschedule appointment on 3/6/2023. With Mr. Novak. I wasn't able to leave a message, voicemail was not set up

## 2023-04-05 ENCOUNTER — OFFICE VISIT (OUTPATIENT)
Dept: PAIN MEDICINE | Facility: CLINIC | Age: 43
End: 2023-04-05
Payer: COMMERCIAL

## 2023-04-05 VITALS
WEIGHT: 303 LBS | HEART RATE: 78 BPM | SYSTOLIC BLOOD PRESSURE: 143 MMHG | HEIGHT: 74 IN | DIASTOLIC BLOOD PRESSURE: 93 MMHG | BODY MASS INDEX: 38.89 KG/M2

## 2023-04-05 DIAGNOSIS — M51.9 LUMBAR DISC DISEASE: ICD-10-CM

## 2023-04-05 DIAGNOSIS — M54.16 LUMBAR RADICULOPATHY, CHRONIC: ICD-10-CM

## 2023-04-05 DIAGNOSIS — M54.16 LUMBAR RADICULOPATHY: Primary | ICD-10-CM

## 2023-04-05 PROCEDURE — 3080F PR MOST RECENT DIASTOLIC BLOOD PRESSURE >= 90 MM HG: ICD-10-PCS | Mod: CPTII,S$GLB,, | Performed by: ANESTHESIOLOGY

## 2023-04-05 PROCEDURE — 99214 OFFICE O/P EST MOD 30 MIN: CPT | Mod: S$GLB,,, | Performed by: ANESTHESIOLOGY

## 2023-04-05 PROCEDURE — 99999 PR PBB SHADOW E&M-EST. PATIENT-LVL III: ICD-10-PCS | Mod: PBBFAC,,, | Performed by: ANESTHESIOLOGY

## 2023-04-05 PROCEDURE — 1159F PR MEDICATION LIST DOCUMENTED IN MEDICAL RECORD: ICD-10-PCS | Mod: CPTII,S$GLB,, | Performed by: ANESTHESIOLOGY

## 2023-04-05 PROCEDURE — 99214 PR OFFICE/OUTPT VISIT, EST, LEVL IV, 30-39 MIN: ICD-10-PCS | Mod: S$GLB,,, | Performed by: ANESTHESIOLOGY

## 2023-04-05 PROCEDURE — 3080F DIAST BP >= 90 MM HG: CPT | Mod: CPTII,S$GLB,, | Performed by: ANESTHESIOLOGY

## 2023-04-05 PROCEDURE — 3008F PR BODY MASS INDEX (BMI) DOCUMENTED: ICD-10-PCS | Mod: CPTII,S$GLB,, | Performed by: ANESTHESIOLOGY

## 2023-04-05 PROCEDURE — 3077F PR MOST RECENT SYSTOLIC BLOOD PRESSURE >= 140 MM HG: ICD-10-PCS | Mod: CPTII,S$GLB,, | Performed by: ANESTHESIOLOGY

## 2023-04-05 PROCEDURE — 3077F SYST BP >= 140 MM HG: CPT | Mod: CPTII,S$GLB,, | Performed by: ANESTHESIOLOGY

## 2023-04-05 PROCEDURE — 3044F PR MOST RECENT HEMOGLOBIN A1C LEVEL <7.0%: ICD-10-PCS | Mod: CPTII,S$GLB,, | Performed by: ANESTHESIOLOGY

## 2023-04-05 PROCEDURE — 3008F BODY MASS INDEX DOCD: CPT | Mod: CPTII,S$GLB,, | Performed by: ANESTHESIOLOGY

## 2023-04-05 PROCEDURE — 99999 PR PBB SHADOW E&M-EST. PATIENT-LVL III: CPT | Mod: PBBFAC,,, | Performed by: ANESTHESIOLOGY

## 2023-04-05 PROCEDURE — 3044F HG A1C LEVEL LT 7.0%: CPT | Mod: CPTII,S$GLB,, | Performed by: ANESTHESIOLOGY

## 2023-04-05 PROCEDURE — 1159F MED LIST DOCD IN RCRD: CPT | Mod: CPTII,S$GLB,, | Performed by: ANESTHESIOLOGY

## 2023-04-05 RX ORDER — HYDROCODONE BITARTRATE AND ACETAMINOPHEN 5; 325 MG/1; MG/1
1 TABLET ORAL EVERY 8 HOURS PRN
Qty: 30 TABLET | Refills: 0 | Status: SHIPPED | OUTPATIENT
Start: 2023-04-05 | End: 2023-06-16 | Stop reason: SDUPTHER

## 2023-04-05 NOTE — PROGRESS NOTES
This note was completed with dictation software and grammatical errors may exist.    Chief Complaint   Patient presents with    Follow-up        HPI: Philip Salinas is a 42 y.o. year old male patient who has a past medical history of Cervical disc disease, Disc degeneration, lumbar, Disc disease, degenerative, cervical, Hypertension, and Lumbar disc disease. He presents in referral from No ref. provider found for back and neck pain.  S/p bilateral L3/4 transforaminal injection on 02/03/2023 with only about a day of moderate relief at best.  He continues to have pain across the bilateral low back and then into the buttock, posterior thigh, medial calves into the top of his feet and great toes.  The pain is much better with sitting, however he can sit for about half an hour before the pain becomes significant.  He also discusses pain that is particularly worse with standing and walking, can not go for more than several minutes without the pain going into his legs and becoming severe.  He denies any tyler weakness in his legs.         He is status post L5/S1 interlaminar epidural steroid injection on 12/21/2022 with 0% relief.  The patient is new to me.  He complains of bilateral low back pain radiating to the left groin, bilateral buttocks, posterior thighs, calves and great toes.  He states he feels like his back is breaking.  He states that the pain alternates sides or can also be bilateral.  His pain is much worse with standing and improved with sitting.  He does his home exercise program that he learned in physical therapy.  He complains of lower extremity numbness but denies weakness.    Initial history: The patient reports that he 1st injured his neck in 2006, this pain is located the base of his neck but feels that at this time it is tolerable and it is his back that is bothering him the most. He states that he has had back pain for over 10 years, states that it is hereditary.He has seen other specialists and  has had several MRIs over the years.  He has done physical therapy numerous times.  While the pain has been severe, it has especially worsened in the last year and especially since this past October.  He states that the pain is across the bilateral low back and radiates into the posterior thighs, calves and into the feet.  It can be either leg.  States that he is fine when he is sitting but as soon as he starts to stand up and walk the pain becomes severe.  States that if he leans forward on a shopping cart he gets relief.  He denies any tyler weakness, no bowel or bladder incontinence.  He states that he can only stand for about 5 minutes before the pain becomes severe and he needs to sit down.    Pain intervention history:  He had done what sounds like lumbar medial branch blocks in the past without relief.He is status post L5/S1 interlaminar epidural steroid injection on 12/21/2022 with 0% relief.  S/p bilateral L3/4 transforaminal injection on 02/03/2023 with only about a day of moderate relief at best.     Spine surgeries:    Antineuropathics:  Has tried gabapentin in the past without any relief.  NSAIDs:  Mobic 15 mg, ibuprofen 1200 mg at a time   Physical therapy:  Has done multiple rounds of physical therapy in the past without long term relief.  Antidepressants:  Cymbalta 30  Muscle relaxers:  Opioids:  Antiplatelets/Anticoagulants:    ROS:  Reports back pain, difficulty sleeping and anxiety.  Balance of review of systems is negative.    Lab Results   Component Value Date    HGBA1C 5.7 (H) 03/13/2023       Lab Results   Component Value Date    WBC 8.11 09/19/2022    HGB 15.6 09/19/2022    HCT 47.2 09/19/2022    MCV 93 09/19/2022     09/19/2022             Past Medical History:   Diagnosis Date    Cervical disc disease     Disc degeneration, lumbar     Disc disease, degenerative, cervical     Hypertension     Lumbar disc disease        Past Surgical History:   Procedure Laterality Date    cervical  "rhizotomy      EPIDURAL STEROID INJECTION INTO LUMBAR SPINE N/A 12/21/2022    Procedure: Injection-steroid-epidural-lumbar L5/S1;  Surgeon: Jared Bartholomew MD;  Location: [facility] OR;  Service: Pain Management;  Laterality: N/A;    TRANSFORAMINAL EPIDURAL INJECTION OF STEROID Bilateral 2/3/2023    Procedure: Injection,steroid,epidural,transforaminal approach L3/4;  Surgeon: Jared Bartholomew MD;  Location: [facility] OR;  Service: Pain Management;  Laterality: Bilateral;       Social History     Socioeconomic History    Marital status:     Number of children: 4   Occupational History    Occupation: supervisor      Comment: [employer]   Tobacco Use    Smoking status: Never    Smokeless tobacco: Never   Substance and Sexual Activity    Alcohol use: No    Drug use: No    Sexual activity: Yes     Partners: Female         Medications/Allergies: See med card    Vitals:    04/05/23 0809   BP: (!) 143/93   Pulse: 78   Weight: (!) 137.5 kg (303 lb 0.4 oz)   Height: 6' 2" (1.88 m)   PainSc:   7       Body mass index is 38.91 kg/m².    Physical exam:    Gen: A and O x3, pleasant, well-groomed  Skin: No rashes or obvious lesions  HEENT: PERRLA, no obvious deformities on ears or in canals. Trachea midline.  CVS: Regular rate and rhythm, normal palpable pulses.  Resp:No increased work of breathing, symmetrical chest rise.  Abdomen: Soft, NT/ND.  Musculoskeletal: No antalgic gait.         Neuro:    Iliopsoas Quadriceps Knee  Flexion Tibialis  anterior Gastro- cnemius EHL   Lower: R 5/5 5/5 5/5 5/5 5/5 5/5    L 5/5 5/5 5/5 5/5 5/5 5/5      Left  Right    Patellar DTR 1+ 1+   Achilles DTR 0+ 0+                    Intact and symmetrical to light touch and pinprick in L1-S1 dermatomes bilaterally.    Lumbar spine:  Lumbar spine: ROM is mildly reduced with flexion with slight increased pain, moderately reduced with extension with increased pain   Scottie's test causes no increased pain on either side.    Supine straight leg " raise is negative bilaterally.    Internal and external rotation of the hip causes no increased pain on either side.  Myofascial exam: No tenderness to palpation across lumbar paraspinous muscles.      Imagin18 Xray L-spine:  Trace retrolisthesis of L4 on L5.  Alignment is otherwise within normal limits.  Vertebral body heights are maintained.  No acute fracture identified.  No high-grade osseous foraminal narrowing on oblique views.  Minor multilevel endplate changes.  Disc spaces are preserved.  No prevertebral soft tissue abnormality.    11 MRI C-spine:    1. MINIMAL C3-4 DISC BULGE WITH SEVERE LEFT AND MODERATE RIGHT FORAMINAL STENOSIS.     2. SEVERE BILATERAL FORAMINAL STENOSIS AT THE C4-5 LEVEL WHICH IS GREATER ON THE RIGHT THAN ON THE LEFT.     3. C5-6 MODERATE BILATERAL FORAMINAL STENOSIS.     4. MINIMAL C6-7 DISC BULGE WITH SEVERE LEFT AND MODERATE RIGHT FORAMINAL STENOSIS.     5. MILD BILATERAL FORAMINAL STENOSIS AT THE C7-T1 LEVEL.       X-ray lumbar spine 22:  There is grade 1 retrolisthesis of L2 on L3, L3 on L4 and L4 on L5.    MRI lumbar spine 2022:  Report only.  The lumbar spine canal demonstrates narrowed appearance likely on a developmental basis.  There is prominent epidural fat throughout the lumbar spine.  L1/2 no canal stenosis.  L2/3 there is mild disc desiccation and disc bulging, mild facet arthrosis.  There is mild bilateral neuroforaminal narrowing at this level.  At L3/4 there is a moderate broad-based disc bulge with mild-to-moderate bilateral facet arthrosis.  There is also slightly prominent epidural fat which contributes to thecal sac narrowing.  There is mild bilateral foraminal narrowing and 6-7 millimeter canal.  At L4/5 there is broad-based disc bulge ligamentum hypertrophy, prominent epidural fat and moderate bilateral facet arthropathy.  The AP diameter the central thecal sac is 8 millimeters.  There is moderate lateral recess narrowing and left greater  than right neural foraminal narrowing.  At L5/S1 there is disc space narrowing, disc bulging superimposed left paracentral annular fissure, ligamentous hypertrophy and severe left greater than right facet hypertrophy.    Assessment:  Philip Salinas is a 42 y.o. year old male patient who has a past medical history of Cervical disc disease, Disc degeneration, lumbar, Disc disease, degenerative, cervical, Hypertension, and Lumbar disc disease. He presents in referral from Dr. Ana Valero for back and neck pain.     1. Lumbar radiculopathy        2. Lumbar disc disease              Plan:  1. I reviewed his lumbar spine MRI personally and it does show significant foraminal narrowing bilaterally at L5/S1 which could explain his radicular pain pattern.  He also has some canal narrowing although mild at L3/4 and some bilateral foraminal narrowing.  Has facet hypertrophy throughout the lower lumbar spine.  We discussed that since he is continuing to have symptoms of neurogenic claudication, I am going to get a new MRI but also obtain an EMG.  If this does show particularly L5/S1 radiculopathy, we may try a bilateral L5/S1 transforaminal injection.  Otherwise, I would have him see Neurosurgery for evaluation.  2. He requested pain medication, he takes this sparingly, I have given him hydrocodone 5/325 to take occasionally.  3.  He states that he has not been taking his blood pressure or diabetes medication on a regular basis and we discussed making sure he sticks with this.

## 2023-04-18 ENCOUNTER — HOSPITAL ENCOUNTER (OUTPATIENT)
Dept: RADIOLOGY | Facility: HOSPITAL | Age: 43
Discharge: HOME | End: 2023-04-18
Attending: ANESTHESIOLOGY
Payer: COMMERCIAL

## 2023-04-18 DIAGNOSIS — M54.16 LUMBAR RADICULOPATHY, CHRONIC: ICD-10-CM

## 2023-04-18 PROCEDURE — 72148 MRI LUMBAR SPINE W/O DYE: CPT | Mod: 26,,, | Performed by: RADIOLOGY

## 2023-04-18 PROCEDURE — 72148 MRI LUMBAR SPINE W/O DYE: CPT | Mod: TC,PO

## 2023-04-18 PROCEDURE — 72148 MRI LUMBAR SPINE WITHOUT CONTRAST: ICD-10-PCS | Mod: 26,,, | Performed by: RADIOLOGY

## 2023-05-01 ENCOUNTER — OFFICE VISIT (OUTPATIENT)
Dept: PHYSICAL MEDICINE AND REHAB | Facility: CLINIC | Age: 43
End: 2023-05-01
Payer: COMMERCIAL

## 2023-05-01 ENCOUNTER — TELEPHONE (OUTPATIENT)
Dept: PAIN MEDICINE | Facility: CLINIC | Age: 43
End: 2023-05-01
Payer: COMMERCIAL

## 2023-05-01 DIAGNOSIS — G89.29 CHRONIC BILATERAL LOW BACK PAIN WITH BILATERAL SCIATICA: ICD-10-CM

## 2023-05-01 DIAGNOSIS — M54.41 CHRONIC BILATERAL LOW BACK PAIN WITH BILATERAL SCIATICA: ICD-10-CM

## 2023-05-01 DIAGNOSIS — M51.9 LUMBAR DISC DISEASE: ICD-10-CM

## 2023-05-01 DIAGNOSIS — M54.42 CHRONIC BILATERAL LOW BACK PAIN WITH BILATERAL SCIATICA: ICD-10-CM

## 2023-05-01 PROCEDURE — 1160F PR REVIEW ALL MEDS BY PRESCRIBER/CLIN PHARMACIST DOCUMENTED: ICD-10-PCS | Mod: CPTII,S$GLB,, | Performed by: PHYSICAL MEDICINE & REHABILITATION

## 2023-05-01 PROCEDURE — 95886 MUSC TEST DONE W/N TEST COMP: CPT | Mod: S$GLB,,, | Performed by: PHYSICAL MEDICINE & REHABILITATION

## 2023-05-01 PROCEDURE — 99999 PR PBB SHADOW E&M-EST. PATIENT-LVL II: CPT | Mod: PBBFAC,,, | Performed by: PHYSICAL MEDICINE & REHABILITATION

## 2023-05-01 PROCEDURE — 99499 UNLISTED E&M SERVICE: CPT | Mod: S$GLB,,, | Performed by: PHYSICAL MEDICINE & REHABILITATION

## 2023-05-01 PROCEDURE — 99499 NO LOS: ICD-10-PCS | Mod: S$GLB,,, | Performed by: PHYSICAL MEDICINE & REHABILITATION

## 2023-05-01 PROCEDURE — 3044F HG A1C LEVEL LT 7.0%: CPT | Mod: CPTII,S$GLB,, | Performed by: PHYSICAL MEDICINE & REHABILITATION

## 2023-05-01 PROCEDURE — 95910 PR NERVE CONDUCTION STUDY; 7-8 STUDIES: ICD-10-PCS | Mod: S$GLB,,, | Performed by: PHYSICAL MEDICINE & REHABILITATION

## 2023-05-01 PROCEDURE — 1160F RVW MEDS BY RX/DR IN RCRD: CPT | Mod: CPTII,S$GLB,, | Performed by: PHYSICAL MEDICINE & REHABILITATION

## 2023-05-01 PROCEDURE — 95910 NRV CNDJ TEST 7-8 STUDIES: CPT | Mod: S$GLB,,, | Performed by: PHYSICAL MEDICINE & REHABILITATION

## 2023-05-01 PROCEDURE — 95886 PR EMG COMPLETE, W/ NERVE CONDUCTION STUDIES, 5+ MUSCLES: ICD-10-PCS | Mod: S$GLB,,, | Performed by: PHYSICAL MEDICINE & REHABILITATION

## 2023-05-01 PROCEDURE — 1159F PR MEDICATION LIST DOCUMENTED IN MEDICAL RECORD: ICD-10-PCS | Mod: CPTII,S$GLB,, | Performed by: PHYSICAL MEDICINE & REHABILITATION

## 2023-05-01 PROCEDURE — 1159F MED LIST DOCD IN RCRD: CPT | Mod: CPTII,S$GLB,, | Performed by: PHYSICAL MEDICINE & REHABILITATION

## 2023-05-01 PROCEDURE — 3044F PR MOST RECENT HEMOGLOBIN A1C LEVEL <7.0%: ICD-10-PCS | Mod: CPTII,S$GLB,, | Performed by: PHYSICAL MEDICINE & REHABILITATION

## 2023-05-01 PROCEDURE — 99999 PR PBB SHADOW E&M-EST. PATIENT-LVL II: ICD-10-PCS | Mod: PBBFAC,,, | Performed by: PHYSICAL MEDICINE & REHABILITATION

## 2023-05-01 NOTE — PROGRESS NOTES
[facility]  [address]  [place] [address]             Full Name: Philip Salinas Gender: Male  Patient ID: [id number] YOB: 1980  History: Bilateral low back pain that radiates down lower extremity with numnbness of the right lower extremity including the groin area.        Visit Date: 5/1/2023 8:27 AM  Age: 42 Years  Examining Physician: Sherri Teran DO  Referring Physician: Nico Bartholomew MD  Technologist: CHERYL Guerra NCS,T  Height: 6 feet 2 inch      Sensory NCS      Nerve / Sites Rec. Site Onset Lat Peak Lat NP Amp PP Amp Segments Distance Velocity     ms ms µV µV  cm m/s   L Sural - Ankle (Calf)      Calf Ankle 2.90 4.00 21.7 4.2 Calf - Ankle 14 48   R Sural - Ankle (Calf)      Calf Ankle 2.71 3.75 16.7 0.99 Calf - Ankle 14 52   L Superficial peroneal - Ankle      Lat leg Ankle 3.40 4.04 10.8 5.3 Lat leg - Ankle 14 41   R Superficial peroneal - Ankle      Lat leg Ankle 2.15 3.21 22.1 5.7 Lat leg - Ankle 14 65       Motor NCS      Nerve / Sites Muscle Latency Amplitude Amp % Duration Segments Distance Lat Diff Velocity     ms mV % ms  cm ms m/s   L Peroneal - EDB      Ankle EDB 4.35 7.2 100 5.79 Ankle - EDB 8        Fib head EDB 12.06 6.7 93 6.08 Fib head - Ankle 35.5 7.71 46      Pop fossa EDB 14.46 6.1 84.8  Pop fossa - Fib head 11 2.40 46   R Peroneal - EDB      Ankle EDB 4.15 8.9 100 6.21 Ankle - EDB 8        Fib head EDB 11.73 8.3 93.5 6.79 Fib head - Ankle 34 7.58 45      Pop fossa EDB 14.23 8.0 90.5 6.83 Pop fossa - Fib head 11 2.50 44   L Tibial - AH      Ankle AH 4.06 9.2 100 5.81 Ankle - AH 8        Pop fossa AH 14.31 7.3 79.2 6.52 Pop fossa - Ankle 41 10.25 40   R Tibial - AH      Ankle AH 4.15 7.5 100 4.90 Ankle - AH 8        Pop fossa AH 14.04 5.3 70.1 5.10 Pop fossa - Ankle 44 9.90 44       EMG Summary Table     Spontaneous MUAP Recruitment   Muscle IA Fib PSW Fasc CRD Amp Dur. Poly Pattern   L. Quadriceps N None None None None N N None N   L. Tibialis  anterior N None None None None N N None N   L. Peroneus longus N None None None None N N None N   L. Gastrocnemius (Medial head) N None None None None N N None N   L. Gluteus medius N None None None None N N None N   R. Quadriceps N None None None None N N None N   R. Tibialis anterior N None None None None N N None N   R. Peroneus longus N None None None None N N None N       Summary    The motor conduction test was normal in all 4 of the tested nerves: L Peroneal - EDB, R Peroneal - EDB, L Tibial - AH, R Tibial - AH.    The sensory conduction test was normal in all 4 of the tested nerves: L Sural - Ankle (Calf), R Sural - Ankle (Calf), L Superficial peroneal - Ankle, R Superficial peroneal - Ankle.    The needle EMG study was normal in all 8 tested muscles: L. Quadriceps, L. Tibialis anterior, L. Peroneus longus, L. Gastrocnemius (Medial head), L. Gluteus medius, R. Quadriceps, R. Tibialis anterior, R. Peroneus longus.      Impression:  Normal study.  No electrophysiologic evidence of bilateral lumbosacral radiculopathy/plexopathy, bilateral tibial mononeuropathy, bilateral peroneal mononeuropathy, or peripheral neuropathy in the lower extremities.    Plan:   We discussed the above findings at length.  Unfortunately, today's electrodiagnostic study does not provide evidence to explain the patient's complaints. Recommend further clinical correlation.      ------------------------------  Sherri Teran, DO

## 2023-05-01 NOTE — TELEPHONE ENCOUNTER
Please let the patient know that I reviewed his lumbar spine MRI and also his EMG.  The EMG did not show any abnormalities which she just means that he does not have nerve damage at this point.  However, I think most likely his pain is coming from his narrowing at L5/S1 and I suggest that we try 1 more epidural steroid injection, would approach with transforaminal at L5/S1 bilaterally.        Physician - Dr Bartholomew    Type of Procedure/Injection - Lumbar Transforaminal Epidural  L5/S1           Laterality - Bilateral      Type of Sedation - Local      Need to hold medication - No      N/A      Clearance needed - No      Follow up - 3 week

## 2023-05-05 DIAGNOSIS — M54.16 BILATERAL LUMBAR RADICULOPATHY: Primary | ICD-10-CM

## 2023-05-05 RX ORDER — ALPRAZOLAM 0.5 MG/1
1 TABLET, ORALLY DISINTEGRATING ORAL ONCE AS NEEDED
Status: CANCELLED | OUTPATIENT
Start: 2023-05-05 | End: 2034-10-01

## 2023-05-05 NOTE — TELEPHONE ENCOUNTER
Spoke with pt and reviewed EMG and MRI results from Dr. Bartholomew and scheduled recommended procedure TFESI on 5/26/23 and scheduled 3 week f/u.

## 2023-05-26 ENCOUNTER — HOSPITAL ENCOUNTER (OUTPATIENT)
Facility: HOSPITAL | Age: 43
Discharge: HOME | End: 2023-05-26
Attending: ANESTHESIOLOGY | Admitting: ANESTHESIOLOGY
Payer: COMMERCIAL

## 2023-05-26 ENCOUNTER — HOSPITAL ENCOUNTER (OUTPATIENT)
Dept: RADIOLOGY | Facility: HOSPITAL | Age: 43
Discharge: HOME | End: 2023-05-26
Attending: ANESTHESIOLOGY | Admitting: ANESTHESIOLOGY
Payer: COMMERCIAL

## 2023-05-26 VITALS
TEMPERATURE: 98 F | SYSTOLIC BLOOD PRESSURE: 126 MMHG | HEIGHT: 74 IN | RESPIRATION RATE: 17 BRPM | DIASTOLIC BLOOD PRESSURE: 84 MMHG | WEIGHT: 300 LBS | OXYGEN SATURATION: 98 % | HEART RATE: 80 BPM | BODY MASS INDEX: 38.5 KG/M2

## 2023-05-26 DIAGNOSIS — M54.16 BILATERAL LUMBAR RADICULOPATHY: ICD-10-CM

## 2023-05-26 DIAGNOSIS — M54.50 LOWER BACK PAIN: ICD-10-CM

## 2023-05-26 PROCEDURE — 64483 NJX AA&/STRD TFRM EPI L/S 1: CPT | Mod: 50,,, | Performed by: ANESTHESIOLOGY

## 2023-05-26 PROCEDURE — 76000 FLUOROSCOPY <1 HR PHYS/QHP: CPT | Mod: TC,PO

## 2023-05-26 PROCEDURE — 25000003 PHARM REV CODE 250: Mod: PO | Performed by: ANESTHESIOLOGY

## 2023-05-26 PROCEDURE — 25500020 PHARM REV CODE 255: Mod: PO | Performed by: ANESTHESIOLOGY

## 2023-05-26 PROCEDURE — 64483 NJX AA&/STRD TFRM EPI L/S 1: CPT | Mod: 50,PO | Performed by: ANESTHESIOLOGY

## 2023-05-26 PROCEDURE — 63600175 PHARM REV CODE 636 W HCPCS: Mod: PO | Performed by: ANESTHESIOLOGY

## 2023-05-26 PROCEDURE — 64483 PR EPIDURAL INJ, ANES/STEROID, TRANSFORAMINAL, LUMB/SACR, SNGL LEVL: ICD-10-PCS | Mod: 50,,, | Performed by: ANESTHESIOLOGY

## 2023-05-26 RX ORDER — ALPRAZOLAM 0.5 MG/1
1 TABLET, ORALLY DISINTEGRATING ORAL ONCE AS NEEDED
Status: COMPLETED | OUTPATIENT
Start: 2023-05-26 | End: 2023-05-26

## 2023-05-26 RX ORDER — ENALAPRIL MALEATE 10 MG/1
10 TABLET ORAL DAILY
COMMUNITY

## 2023-05-26 RX ORDER — LIDOCAINE HYDROCHLORIDE 10 MG/ML
INJECTION, SOLUTION EPIDURAL; INFILTRATION; INTRACAUDAL; PERINEURAL
Status: DISCONTINUED | OUTPATIENT
Start: 2023-05-26 | End: 2023-05-26 | Stop reason: HOSPADM

## 2023-05-26 RX ORDER — METHYLPREDNISOLONE ACETATE 80 MG/ML
INJECTION, SUSPENSION INTRA-ARTICULAR; INTRALESIONAL; INTRAMUSCULAR; SOFT TISSUE
Status: DISCONTINUED | OUTPATIENT
Start: 2023-05-26 | End: 2023-05-26 | Stop reason: HOSPADM

## 2023-05-26 RX ORDER — BUPIVACAINE HYDROCHLORIDE 2.5 MG/ML
INJECTION, SOLUTION EPIDURAL; INFILTRATION; INTRACAUDAL
Status: DISCONTINUED | OUTPATIENT
Start: 2023-05-26 | End: 2023-05-26 | Stop reason: HOSPADM

## 2023-05-26 RX ADMIN — ALPRAZOLAM 1 MG: 0.5 TABLET, ORALLY DISINTEGRATING ORAL at 12:05

## 2023-05-26 NOTE — H&P
"CC: Back pain    HPI: The patient is a 41yo man with a history of lumbar radiculopathy here for bilateral L5/S1 TFESI. There are no major changes in history and physical from 4/5/23.    Past Medical History:   Diagnosis Date    Cervical disc disease     Disc degeneration, lumbar     Disc disease, degenerative, cervical     Hypertension     Lumbar disc disease        Past Surgical History:   Procedure Laterality Date    cervical rhizotomy      EPIDURAL STEROID INJECTION INTO LUMBAR SPINE N/A 12/21/2022    Procedure: Injection-steroid-epidural-lumbar L5/S1;  Surgeon: Jared Bartholomew MD;  Location: [facility] OR;  Service: Pain Management;  Laterality: N/A;    TRANSFORAMINAL EPIDURAL INJECTION OF STEROID Bilateral 2/3/2023    Procedure: Injection,steroid,epidural,transforaminal approach L3/4;  Surgeon: Jared Bartholomew MD;  Location: [facility] OR;  Service: Pain Management;  Laterality: Bilateral;       Family History   Problem Relation Age of Onset    Depression Mother     Hypertension Father     Arthritis Father     Heart disease Maternal Grandmother     Heart disease Maternal Grandfather     Cancer Paternal Grandmother        Social History     Socioeconomic History    Marital status:     Number of children: 4   Occupational History    Occupation: supervisor      Comment: [employer]   Tobacco Use    Smoking status: Never    Smokeless tobacco: Never   Substance and Sexual Activity    Alcohol use: No    Drug use: No    Sexual activity: Yes     Partners: Female       No current facility-administered medications for this encounter.       Review of patient's allergies indicates:  No Known Allergies    Vitals:    05/24/23 0846 05/26/23 1318 05/26/23 1319   BP:  (!) 145/80 (!) 145/80   Pulse:  80    Resp:  16    Temp:  98.1 °F (36.7 °C)    TempSrc:  Skin    SpO2:  98%    Weight: 136.1 kg (300 lb)     Height: 6' 2" (1.88 m)         ASA 2, Mallampati 2    REVIEW OF SYSTEMS:     GENERAL: No weight loss, malaise or " fevers.  HEENT:  No recent changes in vision or hearing  NECK: Negative for lumps, no difficulty with swallowing.  RESPIRATORY: Negative for cough, wheezing or shortness of breath, patient denies any recent URI.  CARDIOVASCULAR: Negative for chest pain, leg swelling or palpitations.  GI: Negative for abdominal discomfort, blood in stools or black stools or change in bowel habits.  MUSCULOSKELETAL: See HPI.  SKIN: Negative for lesions, rash, and itching.  PSYCH: No suicidal or homicidal ideations, no current mood disturbances.  HEMATOLOGY/LYMPHOLOGY: Negative for prolonged bleeding, bruising easily or swollen nodes. Patient is not currently taking any anti-coagulants  ENDO: No history of diabetes or thyroid dysfunction  NEURO: No history of syncope, paralysis, seizures or tremors.All other reviewed and negative other than HPI.    Physical exam:  Gen: A and O x3, pleasant, well-groomed  Skin: No rashes or obvious lesions  HEENT: PERRLA, no obvious deformities on ears or in canals. No thyroid masses, trachea midline, no palpable lymph nodes in neck, axilla.  CVS: Regular rate and rhythm, normal S1 and S2, no murmurs.  Resp: Clear to auscultation bilaterally.  Abdomen: Soft, NT/ND, normal bowel sounds present.  Musculoskeletal/Neuro: Moving all extremities    Assessment:  Bilateral lumbar radiculopathy  -     Case Request Operating Room: Injection,steroid,epidural,transforaminal approach L5/S1 Bilat  -     Place in Outpatient; Standing  -     Vital signs; Standing  -     Verify informed consent; Standing  -     Notify physician ; Standing  -     Notify physician ; Standing  -     Notify physician (specify); Standing  -     Diet NPO; Standing  -     alprazolam ODT dissolvable tablet 1 mg    Other orders  -     IP VTE LOW RISK PATIENT; Standing

## 2023-05-26 NOTE — OP NOTE
PROCEDURE DATE: 5/26/2023    PROCEDURE: Bilateral L5/S1 transforaminal epidural steroid injection under fluoroscopy    DIAGNOSIS: Lumbar  Radiculopathy    Post op diagnosis: Same    PHYSICIAN: Jared Bartholomew MD    MEDICATIONS INJECTED:  Methylprednisolone 40mg (1ml) and 1ml 0.25% bupivicaine at each nerve root.     LOCAL ANESTHETIC INJECTED:  Lidocaine 1%. 4 ml per site.    SEDATION MEDICATIONS: none    ESTIMATED BLOOD LOSS:  none    COMPLICATIONS:  none    TECHNIQUE:   A time-out was taken to identify patient and procedure side prior to starting the procedure. The patient was placed in a prone position, prepped and draped in the usual sterile fashion using ChloraPrep and sterile towels.  The area to be injected was determined under fluoroscopic guidance in AP and oblique view.  Local anesthetic was given by raising a wheal and going down to the hub of a 25-gauge 1.5 inch needle.  In oblique view, a 5 inch 22-gauge bent-tip spinal needle was introduced towards 6 oclock position of the pedicle of each above named nerve root level.  The needle was walked medially then hinged into the neural foramen and position was confirmed in AP and lateral views.  Omnipaque contrast dye was injected to confirm appropriate placement and that there was no vascular uptake.  After negative aspiration for blood or CSF, the medication was then injected. This was performed at the right and then left L5/S1 level(s). The patient tolerated the procedure well.    The patient was monitored after the procedure.  Patient was given post procedure and discharge instructions to follow at home. The patient was discharged in a stable condition.

## 2023-05-26 NOTE — DISCHARGE SUMMARY
[facility] - [facility]  Discharge Note  Short Stay    Procedure(s) (LRB):  Injection,steroid,epidural,transforaminal approach L5/S1 Bilat (Bilateral)      OUTCOME: Patient tolerated treatment/procedure well without complication and is now ready for discharge.    DISPOSITION: Home or Self Care    FINAL DIAGNOSIS:  Lumbar radiculopathy    FOLLOWUP: In clinic    DISCHARGE INSTRUCTIONS:    Discharge Procedure Orders   Diet Adult Regular     No dressing needed     Notify your health care provider if you experience any of the following:  temperature >100.4     Activity as tolerated

## 2023-06-16 ENCOUNTER — HOSPITAL ENCOUNTER (OUTPATIENT)
Dept: RADIOLOGY | Facility: HOSPITAL | Age: 43
Discharge: HOME | End: 2023-06-16
Payer: COMMERCIAL

## 2023-06-16 ENCOUNTER — OFFICE VISIT (OUTPATIENT)
Dept: PAIN MEDICINE | Facility: CLINIC | Age: 43
End: 2023-06-16
Payer: COMMERCIAL

## 2023-06-16 VITALS
HEIGHT: 74 IN | HEART RATE: 73 BPM | BODY MASS INDEX: 38.83 KG/M2 | SYSTOLIC BLOOD PRESSURE: 130 MMHG | DIASTOLIC BLOOD PRESSURE: 80 MMHG | WEIGHT: 302.56 LBS

## 2023-06-16 DIAGNOSIS — M54.16 LUMBAR RADICULOPATHY: ICD-10-CM

## 2023-06-16 DIAGNOSIS — M48.061 SPINAL STENOSIS OF LUMBAR REGION WITHOUT NEUROGENIC CLAUDICATION: ICD-10-CM

## 2023-06-16 DIAGNOSIS — M54.16 BILATERAL LUMBAR RADICULOPATHY: Primary | ICD-10-CM

## 2023-06-16 DIAGNOSIS — M47.816 LUMBAR SPONDYLOSIS: ICD-10-CM

## 2023-06-16 DIAGNOSIS — M54.16 BILATERAL LUMBAR RADICULOPATHY: ICD-10-CM

## 2023-06-16 PROCEDURE — 3075F SYST BP GE 130 - 139MM HG: CPT | Mod: CPTII,S$GLB,,

## 2023-06-16 PROCEDURE — 72114 X-RAY EXAM L-S SPINE BENDING: CPT | Mod: TC,FY,PO

## 2023-06-16 PROCEDURE — 99214 PR OFFICE/OUTPT VISIT, EST, LEVL IV, 30-39 MIN: ICD-10-PCS | Mod: S$GLB,,,

## 2023-06-16 PROCEDURE — 4010F PR ACE/ARB THEARPY RXD/TAKEN: ICD-10-PCS | Mod: CPTII,S$GLB,,

## 2023-06-16 PROCEDURE — 72114 XR LUMBAR SPINE 5 VIEW WITH FLEX AND EXT: ICD-10-PCS | Mod: 26,,, | Performed by: RADIOLOGY

## 2023-06-16 PROCEDURE — 99214 OFFICE O/P EST MOD 30 MIN: CPT | Mod: S$GLB,,,

## 2023-06-16 PROCEDURE — 99999 PR PBB SHADOW E&M-EST. PATIENT-LVL III: ICD-10-PCS | Mod: PBBFAC,,,

## 2023-06-16 PROCEDURE — 99999 PR PBB SHADOW E&M-EST. PATIENT-LVL III: CPT | Mod: PBBFAC,,,

## 2023-06-16 PROCEDURE — 4010F ACE/ARB THERAPY RXD/TAKEN: CPT | Mod: CPTII,S$GLB,,

## 2023-06-16 PROCEDURE — 72114 X-RAY EXAM L-S SPINE BENDING: CPT | Mod: 26,,, | Performed by: RADIOLOGY

## 2023-06-16 PROCEDURE — 3079F PR MOST RECENT DIASTOLIC BLOOD PRESSURE 80-89 MM HG: ICD-10-PCS | Mod: CPTII,S$GLB,,

## 2023-06-16 PROCEDURE — 3008F BODY MASS INDEX DOCD: CPT | Mod: CPTII,S$GLB,,

## 2023-06-16 PROCEDURE — 3044F HG A1C LEVEL LT 7.0%: CPT | Mod: CPTII,S$GLB,,

## 2023-06-16 PROCEDURE — 3075F PR MOST RECENT SYSTOLIC BLOOD PRESS GE 130-139MM HG: ICD-10-PCS | Mod: CPTII,S$GLB,,

## 2023-06-16 PROCEDURE — 1159F PR MEDICATION LIST DOCUMENTED IN MEDICAL RECORD: ICD-10-PCS | Mod: CPTII,S$GLB,,

## 2023-06-16 PROCEDURE — 3044F PR MOST RECENT HEMOGLOBIN A1C LEVEL <7.0%: ICD-10-PCS | Mod: CPTII,S$GLB,,

## 2023-06-16 PROCEDURE — 3008F PR BODY MASS INDEX (BMI) DOCUMENTED: ICD-10-PCS | Mod: CPTII,S$GLB,,

## 2023-06-16 PROCEDURE — 1159F MED LIST DOCD IN RCRD: CPT | Mod: CPTII,S$GLB,,

## 2023-06-16 PROCEDURE — 3079F DIAST BP 80-89 MM HG: CPT | Mod: CPTII,S$GLB,,

## 2023-06-16 RX ORDER — HYDROCODONE BITARTRATE AND ACETAMINOPHEN 5; 325 MG/1; MG/1
1 TABLET ORAL EVERY 8 HOURS PRN
Qty: 30 TABLET | Refills: 0 | Status: SHIPPED | OUTPATIENT
Start: 2023-06-16 | End: 2023-07-11 | Stop reason: SDUPTHER
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
## 2023-06-29 ENCOUNTER — OFFICE VISIT (OUTPATIENT)
Dept: NEUROSURGERY | Facility: CLINIC | Age: 43
End: 2023-06-29
Payer: COMMERCIAL

## 2023-06-29 VITALS
BODY MASS INDEX: 38.76 KG/M2 | RESPIRATION RATE: 18 BRPM | SYSTOLIC BLOOD PRESSURE: 152 MMHG | HEART RATE: 81 BPM | HEIGHT: 74 IN | DIASTOLIC BLOOD PRESSURE: 103 MMHG | WEIGHT: 302 LBS

## 2023-06-29 DIAGNOSIS — M54.16 BILATERAL LUMBAR RADICULOPATHY: ICD-10-CM

## 2023-06-29 DIAGNOSIS — M48.061 SPINAL STENOSIS OF LUMBAR REGION WITHOUT NEUROGENIC CLAUDICATION: ICD-10-CM

## 2023-06-29 PROCEDURE — 3008F PR BODY MASS INDEX (BMI) DOCUMENTED: ICD-10-PCS | Mod: CPTII,S$GLB,, | Performed by: STUDENT IN AN ORGANIZED HEALTH CARE EDUCATION/TRAINING PROGRAM

## 2023-06-29 PROCEDURE — 3077F SYST BP >= 140 MM HG: CPT | Mod: CPTII,S$GLB,, | Performed by: STUDENT IN AN ORGANIZED HEALTH CARE EDUCATION/TRAINING PROGRAM

## 2023-06-29 PROCEDURE — 4010F PR ACE/ARB THEARPY RXD/TAKEN: ICD-10-PCS | Mod: CPTII,S$GLB,, | Performed by: STUDENT IN AN ORGANIZED HEALTH CARE EDUCATION/TRAINING PROGRAM

## 2023-06-29 PROCEDURE — 99204 OFFICE O/P NEW MOD 45 MIN: CPT | Mod: S$GLB,,, | Performed by: STUDENT IN AN ORGANIZED HEALTH CARE EDUCATION/TRAINING PROGRAM

## 2023-06-29 PROCEDURE — 3077F PR MOST RECENT SYSTOLIC BLOOD PRESSURE >= 140 MM HG: ICD-10-PCS | Mod: CPTII,S$GLB,, | Performed by: STUDENT IN AN ORGANIZED HEALTH CARE EDUCATION/TRAINING PROGRAM

## 2023-06-29 PROCEDURE — 3080F DIAST BP >= 90 MM HG: CPT | Mod: CPTII,S$GLB,, | Performed by: STUDENT IN AN ORGANIZED HEALTH CARE EDUCATION/TRAINING PROGRAM

## 2023-06-29 PROCEDURE — 3008F BODY MASS INDEX DOCD: CPT | Mod: CPTII,S$GLB,, | Performed by: STUDENT IN AN ORGANIZED HEALTH CARE EDUCATION/TRAINING PROGRAM

## 2023-06-29 PROCEDURE — 3044F HG A1C LEVEL LT 7.0%: CPT | Mod: CPTII,S$GLB,, | Performed by: STUDENT IN AN ORGANIZED HEALTH CARE EDUCATION/TRAINING PROGRAM

## 2023-06-29 PROCEDURE — 1159F PR MEDICATION LIST DOCUMENTED IN MEDICAL RECORD: ICD-10-PCS | Mod: CPTII,S$GLB,, | Performed by: STUDENT IN AN ORGANIZED HEALTH CARE EDUCATION/TRAINING PROGRAM

## 2023-06-29 PROCEDURE — 4010F ACE/ARB THERAPY RXD/TAKEN: CPT | Mod: CPTII,S$GLB,, | Performed by: STUDENT IN AN ORGANIZED HEALTH CARE EDUCATION/TRAINING PROGRAM

## 2023-06-29 PROCEDURE — 1159F MED LIST DOCD IN RCRD: CPT | Mod: CPTII,S$GLB,, | Performed by: STUDENT IN AN ORGANIZED HEALTH CARE EDUCATION/TRAINING PROGRAM

## 2023-06-29 PROCEDURE — 3044F PR MOST RECENT HEMOGLOBIN A1C LEVEL <7.0%: ICD-10-PCS | Mod: CPTII,S$GLB,, | Performed by: STUDENT IN AN ORGANIZED HEALTH CARE EDUCATION/TRAINING PROGRAM

## 2023-06-29 PROCEDURE — 99204 PR OFFICE/OUTPT VISIT, NEW, LEVL IV, 45-59 MIN: ICD-10-PCS | Mod: S$GLB,,, | Performed by: STUDENT IN AN ORGANIZED HEALTH CARE EDUCATION/TRAINING PROGRAM

## 2023-06-29 PROCEDURE — 3080F PR MOST RECENT DIASTOLIC BLOOD PRESSURE >= 90 MM HG: ICD-10-PCS | Mod: CPTII,S$GLB,, | Performed by: STUDENT IN AN ORGANIZED HEALTH CARE EDUCATION/TRAINING PROGRAM

## 2023-06-29 NOTE — PROGRESS NOTES
[facility] Neurosurgery [facility]  Clinic Consult     Consult Requested By: Justin Chandra PA-C  PCP: Ana Valero DO    SUBJECTIVE:     Chief Complaint:   Chief Complaint   Patient presents with    Lumbar Spine Pain (L-Spine)     Patient present to clinic today as back pain. States when standing long periods of time numbness/tingling of groin area radiating bilateral legs. No relief from AMADEO       History of Present Illness:  Philip Salinas is a 42 y.o. male who presents for evaluation of back and leg pain. Patient reports back in the low back which radiates into the groin and down the legs. He reports he is comfortable when he is seated or lying down. He cannot stand/walk more than 5 minutes without significant pain. He underwent an injection without significant relief.     Pertinent and recent history, provider evaluations, imaging and data reviewed in EPIC        Past Medical History:   Diagnosis Date    Cervical disc disease     Disc degeneration, lumbar     Disc disease, degenerative, cervical     Hypertension     Lumbar disc disease      Past Surgical History:   Procedure Laterality Date    cervical rhizotomy      EPIDURAL STEROID INJECTION INTO LUMBAR SPINE N/A 12/21/2022    Procedure: Injection-steroid-epidural-lumbar L5/S1;  Surgeon: Jared Bartholomew MD;  Location: [facility] OR;  Service: Pain Management;  Laterality: N/A;    TRANSFORAMINAL EPIDURAL INJECTION OF STEROID Bilateral 2/3/2023    Procedure: Injection,steroid,epidural,transforaminal approach L3/4;  Surgeon: Jared Bartholomew MD;  Location: [facility] OR;  Service: Pain Management;  Laterality: Bilateral;    TRANSFORAMINAL EPIDURAL INJECTION OF STEROID Bilateral 5/26/2023    Procedure: Injection,steroid,epidural,transforaminal approach L5/S1 Bilat;  Surgeon: Jared Bartholomew MD;  Location: [facility] OR;  Service: Pain Management;  Laterality: Bilateral;  L5/S1 Bilat     Family History   Problem Relation Age of Onset     "Depression Mother     Hypertension Father     Arthritis Father     Heart disease Maternal Grandmother     Heart disease Maternal Grandfather     Cancer Paternal Grandmother      Social History     Tobacco Use    Smoking status: Never    Smokeless tobacco: Never   Substance Use Topics    Alcohol use: No    Drug use: No      Review of patient's allergies indicates:  No Known Allergies    Current Outpatient Medications:     enalapril (VASOTEC) 10 MG tablet, Take 10 mg by mouth once daily., Disp: , Rfl:     HYDROcodone-acetaminophen (NORCO) 5-325 mg per tablet, Take 1 tablet by mouth every 8 (eight) hours as needed for Pain., Disp: 30 tablet, Rfl: 0    metFORMIN (GLUCOPHAGE) 500 MG tablet, Take 1 tablet (500 mg total) by mouth 2 (two) times daily with meals., Disp: 180 tablet, Rfl: 3    Review of Systems:   Constitutional: no fever, chills or night sweats. No changes in weight   Eyes: no visual changes   ENT: no nasal congestion or sore throat   Respiratory: no cough or shortness of breath   Cardiovascular: no chest pain or palpitations   Gastrointestinal: no nausea or vomiting   Genitourinary: no hematuria or dysuria   Integument/Breast: no rash or pruritis   Hematologic/Lymphatic: no easy bruising or lymphadenopathy   Musculoskeletal: +back pain, leg pain   Neurological: no seizures or tremors +paresthesias   Behavioral/Psych: no auditory or visual hallucinations   Endocrine: no heat or cold intolerance         OBJECTIVE:     Vital Signs (Most Recent):  Pulse: 81 (06/29/23 0836)  Resp: 18 (06/29/23 0836)  BP: (!) 152/103 (06/29/23 0836)  Estimated body mass index is 38.77 kg/m² as calculated from the following:    Height as of this encounter: 6' 2" (1.88 m).    Weight as of this encounter: 137 kg (302 lb).    Physical Exam:   General: well developed, well nourished, no distress   Neurologic: Alert and oriented. Thought content appropriate. GCS 15.   Head: normocephalic, atraumatic  Eyes: EOMI  Neck: trachea midline, " no JVD   Cardiovascular: no LE edema  Pulmonary: normal respirations, no signs of respiratory distress  Abdomen: non-distended  Sensory: intact to light touch throughout  Skin: Skin is warm, dry and intact    Motor Strength: Moves all extremities spontaneously with good tone. No abnormal movements seen.       Deltoids Triceps Biceps Wrist Extension Wrist Flexion Hand  Interossei   Upper: R 5/5 5/5 5/5 5/5 5/5 5/5 5/5    L 5/5 5/5 5/5 5/5 5/5 5/5 5/5     Iliopsoas Quadriceps Knee  Flexion Tibialis  anterior Gastro- cnemius EHL    Lower: R 5/5 5/5 5/5 5/5 5/5 5/5     L 5/5 5/5 5/5 5/5 5/5 5/5      DTR's: 1+ patellar   Gait: normal     Able to walk on heels & toes        Diagnostic Results:  I have independently reviewed the following imaging:  MRI lumbar spine  FINDINGS:  Alignment: Normal.     Vertebral column: Vertebral body heights are maintained.  No evidence of an acute fracture or aggressive marrow replacement process. Spinal canal appears diffusely small in caliber, likely on a developmental basis. Epic lipomatosis, most pronounced at L2-L4.  Multilevel disc degeneration, most pronounced at L5-S1.     Cord: Normal.  Conus terminates at L1.     Degenerative findings:     T12-L1: Disc is normal configuration.  Mild bilateral facet arthropathy.  No neural foraminal or spinal canal stenosis.     L1-L2: Disc is normal in configuration.  Mild bilateral facet arthropathy and ligamentum flavum thickening.  There is no neural foraminal stenosis.  There is no spinal canal stenosis.     L2-L3: Mild diffuse disc bulge.  Mild bilateral facet arthropathy and ligamentum flavum thickening.  Epidural lipomatosis.  Mild bilateral neural foraminal stenosis.  Moderate spinal canal stenosis.     L3-L4: Diffuse disc bulge.  Mild-to-moderate bilateral facet arthropathy.  Epidural lipomatosis.  Mild bilateral neural foraminal stenosis.  Severe spinal canal stenosis.     L4-L5: Diffuse disc bulge with small central disc protrusion.   Moderate bilateral facet arthropathy.  Ligamentum flavum thickening.  Epidural lipomatosis.  Moderate left and mild right neural foraminal stenosis.  Moderate spinal canal stenosis.     L5-S1: Diffuse disc bulge.  Moderate left greater than right facet arthropathy.  Ligamentum flavum thickening.  Severe left and moderate right neural foraminal stenosis.  There is no spinal canal stenosis.     Paraspinal muscles & soft tissues: No significant abnormalities.     Impression:     1. Multilevel degenerative change of the lumbar spine on the background of a developmentally small spinal canal and epidural lipomatosis, as described in detail above, resulting in moderate to severe spinal canal stenosis at L2-3, L3-4 and L4-5.  2. Severe left and moderate right neural foraminal stenosis at L5-S1.        Electronically signed by: Wm Tovar  Date:                                            04/18/2023  Time:                                           07:57    ASSESSMENT/PLAN:     Bilateral lumbar radiculopathy  -     Ambulatory referral/consult to Neurosurgery    Spinal stenosis of lumbar region without neurogenic claudication  -     Ambulatory referral/consult to Neurosurgery        Philip LINDSEY Salinas is a 42 y.o. 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 All questions/concerns were addressed. The patient has verbalized a good understanding of the diagnosis, the planned procedure, anticipated post-operative course, overall expectations, and risks including but  not limited to: spinal cord or nerve root injury/paralysis, death, nerve injury leading to pain or neurological deficit, csf leak, vascular injury or serious bleeding/need for blood product transfusion/stroke, wrong level surgery, chronic pain/failure to improve or worsening of symptoms, infection, need for further surgery at the same or different levels; medical (eg Heart attack, blood clot, infection) and anesthetic complications.                Patient verbalized understanding of plan. Encouraged to call with any questions or concerns.     This note was partially dictated using voice recognition software, so please excuse any errors that were not corrected.

## 2023-08-24 RX ORDER — HYDROCODONE BITARTRATE AND ACETAMINOPHEN 5; 325 MG/1; MG/1
1 TABLET ORAL EVERY 8 HOURS PRN
Qty: 30 TABLET | Refills: 0 | Status: SHIPPED | OUTPATIENT
Start: 2023-08-24 | End: 2023-09-08 | Stop reason: SDUPTHER

## 2023-10-06 RX ORDER — HYDROCODONE BITARTRATE AND ACETAMINOPHEN 5; 325 MG/1; MG/1
1 TABLET ORAL EVERY 8 HOURS PRN
Qty: 30 TABLET | Refills: 0 | Status: SHIPPED | OUTPATIENT
Start: 2023-10-06 | End: 2023-10-17 | Stop reason: SDUPTHER

## 2023-10-16 ENCOUNTER — OFFICE VISIT (OUTPATIENT)
Dept: PAIN MEDICINE | Facility: CLINIC | Age: 43
End: 2023-10-16
Payer: COMMERCIAL

## 2023-10-16 VITALS
WEIGHT: 310.88 LBS | SYSTOLIC BLOOD PRESSURE: 138 MMHG | DIASTOLIC BLOOD PRESSURE: 90 MMHG | HEIGHT: 74 IN | BODY MASS INDEX: 39.9 KG/M2 | HEART RATE: 79 BPM

## 2023-10-16 DIAGNOSIS — Z79.891 OPIOID CONTRACT EXISTS: ICD-10-CM

## 2023-10-16 DIAGNOSIS — M47.816 LUMBAR SPONDYLOSIS: ICD-10-CM

## 2023-10-16 DIAGNOSIS — M48.062 SPINAL STENOSIS OF LUMBAR REGION WITH NEUROGENIC CLAUDICATION: ICD-10-CM

## 2023-10-16 DIAGNOSIS — M54.16 LUMBAR RADICULOPATHY: Primary | ICD-10-CM

## 2023-10-16 PROCEDURE — 99214 PR OFFICE/OUTPT VISIT, EST, LEVL IV, 30-39 MIN: ICD-10-PCS | Mod: S$GLB,,, | Performed by: PHYSICIAN ASSISTANT

## 2023-10-16 PROCEDURE — 4010F PR ACE/ARB THEARPY RXD/TAKEN: ICD-10-PCS | Mod: CPTII,S$GLB,, | Performed by: PHYSICIAN ASSISTANT

## 2023-10-16 PROCEDURE — 1159F PR MEDICATION LIST DOCUMENTED IN MEDICAL RECORD: ICD-10-PCS | Mod: CPTII,S$GLB,, | Performed by: PHYSICIAN ASSISTANT

## 2023-10-16 PROCEDURE — 3044F HG A1C LEVEL LT 7.0%: CPT | Mod: CPTII,S$GLB,, | Performed by: PHYSICIAN ASSISTANT

## 2023-10-16 PROCEDURE — 3008F BODY MASS INDEX DOCD: CPT | Mod: CPTII,S$GLB,, | Performed by: PHYSICIAN ASSISTANT

## 2023-10-16 PROCEDURE — 99214 OFFICE O/P EST MOD 30 MIN: CPT | Mod: S$GLB,,, | Performed by: PHYSICIAN ASSISTANT

## 2023-10-16 PROCEDURE — 3075F PR MOST RECENT SYSTOLIC BLOOD PRESS GE 130-139MM HG: ICD-10-PCS | Mod: CPTII,S$GLB,, | Performed by: PHYSICIAN ASSISTANT

## 2023-10-16 PROCEDURE — 1159F MED LIST DOCD IN RCRD: CPT | Mod: CPTII,S$GLB,, | Performed by: PHYSICIAN ASSISTANT

## 2023-10-16 PROCEDURE — 3075F SYST BP GE 130 - 139MM HG: CPT | Mod: CPTII,S$GLB,, | Performed by: PHYSICIAN ASSISTANT

## 2023-10-16 PROCEDURE — 3008F PR BODY MASS INDEX (BMI) DOCUMENTED: ICD-10-PCS | Mod: CPTII,S$GLB,, | Performed by: PHYSICIAN ASSISTANT

## 2023-10-16 PROCEDURE — 99999 PR PBB SHADOW E&M-EST. PATIENT-LVL III: CPT | Mod: PBBFAC,,, | Performed by: PHYSICIAN ASSISTANT

## 2023-10-16 PROCEDURE — 99999 PR PBB SHADOW E&M-EST. PATIENT-LVL III: ICD-10-PCS | Mod: PBBFAC,,, | Performed by: PHYSICIAN ASSISTANT

## 2023-10-16 PROCEDURE — 1160F RVW MEDS BY RX/DR IN RCRD: CPT | Mod: CPTII,S$GLB,, | Performed by: PHYSICIAN ASSISTANT

## 2023-10-16 PROCEDURE — 1160F PR REVIEW ALL MEDS BY PRESCRIBER/CLIN PHARMACIST DOCUMENTED: ICD-10-PCS | Mod: CPTII,S$GLB,, | Performed by: PHYSICIAN ASSISTANT

## 2023-10-16 PROCEDURE — 3080F PR MOST RECENT DIASTOLIC BLOOD PRESSURE >= 90 MM HG: ICD-10-PCS | Mod: CPTII,S$GLB,, | Performed by: PHYSICIAN ASSISTANT

## 2023-10-16 PROCEDURE — 3044F PR MOST RECENT HEMOGLOBIN A1C LEVEL <7.0%: ICD-10-PCS | Mod: CPTII,S$GLB,, | Performed by: PHYSICIAN ASSISTANT

## 2023-10-16 PROCEDURE — 3080F DIAST BP >= 90 MM HG: CPT | Mod: CPTII,S$GLB,, | Performed by: PHYSICIAN ASSISTANT

## 2023-10-16 PROCEDURE — 4010F ACE/ARB THERAPY RXD/TAKEN: CPT | Mod: CPTII,S$GLB,, | Performed by: PHYSICIAN ASSISTANT

## 2023-10-16 PROCEDURE — 80326 AMPHETAMINES 5 OR MORE: CPT | Performed by: PHYSICIAN ASSISTANT

## 2023-10-17 DIAGNOSIS — M51.36 DDD (DEGENERATIVE DISC DISEASE), LUMBAR: Primary | ICD-10-CM

## 2023-10-17 RX ORDER — HYDROCODONE BITARTRATE AND ACETAMINOPHEN 5; 325 MG/1; MG/1
1 TABLET ORAL EVERY 8 HOURS PRN
Qty: 90 TABLET | Refills: 0 | Status: SHIPPED | OUTPATIENT
Start: 2023-10-17 | End: 2023-11-16

## 2023-10-17 RX ORDER — HYDROCODONE BITARTRATE AND ACETAMINOPHEN 5; 325 MG/1; MG/1
1 TABLET ORAL EVERY 8 HOURS PRN
Qty: 90 TABLET | Refills: 0 | Status: SHIPPED | OUTPATIENT
Start: 2023-12-16 | End: 2024-01-22 | Stop reason: SDUPTHER

## 2023-10-17 RX ORDER — HYDROCODONE BITARTRATE AND ACETAMINOPHEN 5; 325 MG/1; MG/1
1 TABLET ORAL EVERY 8 HOURS PRN
Qty: 90 TABLET | Refills: 0 | Status: SHIPPED | OUTPATIENT
Start: 2023-11-16 | End: 2023-12-16

## 2023-10-17 NOTE — PROGRESS NOTES
This note was completed with dictation software and grammatical errors may exist.    Chief Complaint   Patient presents with    Low-back Pain    Tailbone Pain        HPI: Philip Salinas is a 42 y.o. year old male patient who has a past medical history of Cervical disc disease, Disc degeneration, lumbar, Disc disease, degenerative, cervical, Hypertension, and Lumbar disc disease. He presents in referral from No ref. provider found for back and neck pain.  He returns in follow-up today with continued low back and leg pain.  Since his last visit he was seen by Dr. Stanley and the patient does have plans to have lumbar surgery in the future.  However, he recently started a new job 3 months ago and can not have the surgery yet.  He did not have relief with physical therapy or interventional procedures but has been taking hydrocodone-acetaminophen 5/325 mg up to 3 times a day with moderate relief of his pain.  He describes bilateral low back pain that radiates to the bilateral buttocks, posterior thighs, calves and feet that is much worse with standing.  He will experience weakness and numbness in his legs if standing for about 5 minutes.  His symptoms are relieved with sitting.  He denies bladder or bowel incontinence.    Initial history: The patient reports that he 1st injured his neck in 2006, this pain is located the base of his neck but feels that at this time it is tolerable and it is his back that is bothering him the most. He states that he has had back pain for over 10 years, states that it is hereditary.He has seen other specialists and has had several MRIs over the years.  He has done physical therapy numerous times.  While the pain has been severe, it has especially worsened in the last year and especially since this past October.  He states that the pain is across the bilateral low back and radiates into the posterior thighs, calves and into the feet.  It can be either leg.  States that he is fine when he is  sitting but as soon as he starts to stand up and walk the pain becomes severe.  States that if he leans forward on a shopping cart he gets relief.  He denies any tyler weakness, no bowel or bladder incontinence.  He states that he can only stand for about 5 minutes before the pain becomes severe and he needs to sit down.    Pain intervention history:  He had done what sounds like lumbar medial branch blocks in the past without relief.He is status post L5/S1 interlaminar epidural steroid injection on 12/21/2022 with 0% relief.  S/p bilateral L3/4 transforaminal injection on 02/03/2023 with only about a day of moderate relief at best. He is status post bilateral L5/S1 AMADEO on 05/26/2023 with 0% relief.    Spine surgeries:    Antineuropathics:  Has tried gabapentin in the past without any relief.  NSAIDs:  Mobic 15 mg, ibuprofen 1200 mg at a time   Physical therapy:  Has done multiple rounds of physical therapy in the past without long term relief.  Antidepressants:  Cymbalta 30  Muscle relaxers:  Opioids:  Antiplatelets/Anticoagulants:    ROS:  Reports back pain, difficulty sleeping and anxiety.  Balance of review of systems is negative.    Lab Results   Component Value Date    HGBA1C 5.7 (H) 03/13/2023       Lab Results   Component Value Date    WBC 8.11 09/19/2022    HGB 15.6 09/19/2022    HCT 47.2 09/19/2022    MCV 93 09/19/2022     09/19/2022             Past Medical History:   Diagnosis Date    Cervical disc disease     Disc degeneration, lumbar     Disc disease, degenerative, cervical     Hypertension     Lumbar disc disease        Past Surgical History:   Procedure Laterality Date    cervical rhizotomy      EPIDURAL STEROID INJECTION INTO LUMBAR SPINE N/A 12/21/2022    Procedure: Injection-steroid-epidural-lumbar L5/S1;  Surgeon: Jared Bartholomew MD;  Location: [facility] OR;  Service: Pain Management;  Laterality: N/A;    TRANSFORAMINAL EPIDURAL INJECTION OF STEROID Bilateral 2/3/2023    Procedure:  "Injection,steroid,epidural,transforaminal approach L3/4;  Surgeon: Jared Bartholomew MD;  Location: [facility] OR;  Service: Pain Management;  Laterality: Bilateral;    TRANSFORAMINAL EPIDURAL INJECTION OF STEROID Bilateral 5/26/2023    Procedure: Injection,steroid,epidural,transforaminal approach L5/S1 Bilat;  Surgeon: Jared Bartholomew MD;  Location: [facility] OR;  Service: Pain Management;  Laterality: Bilateral;  L5/S1 Bilat       Social History     Socioeconomic History    Marital status:     Number of children: 4   Occupational History    Occupation: supervisor      Comment: [employer]   Tobacco Use    Smoking status: Never    Smokeless tobacco: Never   Substance and Sexual Activity    Alcohol use: No    Drug use: No    Sexual activity: Yes     Partners: Female         Medications/Allergies: See med card    Vitals:    10/16/23 1607   BP: (!) 138/90   Pulse: 79   Weight: (!) 141 kg (310 lb 13.6 oz)   Height: 6' 2" (1.88 m)   PainSc: 10-Worst pain ever   PainLoc: Generalized       Body mass index is 39.91 kg/m².    Physical exam:  Gen: A and O x3, pleasant, well-groomed  Skin: No rashes or obvious lesions  HEENT: PERRLA, no obvious deformities on ears or in canals. Trachea midline.  CVS: Regular rate and rhythm, normal palpable pulses.  Resp:No increased work of breathing, symmetrical chest rise.  Abdomen: Soft, NT/ND.  Musculoskeletal: No antalgic gait.     Neuro:    Iliopsoas Quadriceps Knee  Flexion Tibialis  anterior Gastro- cnemius EHL   Lower: R 5/5 5/5 5/5 5/5 5/5 5/5    L 5/5 5/5 5/5 5/5 5/5 5/5      Left  Right    Patellar DTR 1+ 1+   Achilles DTR 0+ 0+                    Intact and symmetrical to light touch and pinprick in L1-S1 dermatomes bilaterally.    Lumbar spine:  Lumbar spine: ROM is mildly reduced with flexion with slight increased pain, moderately reduced with extension with increased pain  Scottie's test causes no increased pain on either side.    Supine straight leg raise is negative " bilaterally.    Internal and external rotation of the hip causes no increased pain on either side.  Myofascial exam: No tenderness to palpation across lumbar paraspinous muscles.      Imagin18 Xray L-spine:  Trace retrolisthesis of L4 on L5.  Alignment is otherwise within normal limits.  Vertebral body heights are maintained.  No acute fracture identified.  No high-grade osseous foraminal narrowing on oblique views.  Minor multilevel endplate changes.  Disc spaces are preserved.  No prevertebral soft tissue abnormality.    11 MRI C-spine:    1. MINIMAL C3-4 DISC BULGE WITH SEVERE LEFT AND MODERATE RIGHT FORAMINAL STENOSIS.     2. SEVERE BILATERAL FORAMINAL STENOSIS AT THE C4-5 LEVEL WHICH IS GREATER ON THE RIGHT THAN ON THE LEFT.     3. C5-6 MODERATE BILATERAL FORAMINAL STENOSIS.     4. MINIMAL C6-7 DISC BULGE WITH SEVERE LEFT AND MODERATE RIGHT FORAMINAL STENOSIS.     5. MILD BILATERAL FORAMINAL STENOSIS AT THE C7-T1 LEVEL.       X-ray lumbar spine 22:  There is grade 1 retrolisthesis of L2 on L3, L3 on L4 and L4 on L5.    MRI lumbar spine 2022:  Report only.  The lumbar spine canal demonstrates narrowed appearance likely on a developmental basis.  There is prominent epidural fat throughout the lumbar spine.  L1/2 no canal stenosis.  L2/3 there is mild disc desiccation and disc bulging, mild facet arthrosis.  There is mild bilateral neuroforaminal narrowing at this level.  At L3/4 there is a moderate broad-based disc bulge with mild-to-moderate bilateral facet arthrosis.  There is also slightly prominent epidural fat which contributes to thecal sac narrowing.  There is mild bilateral foraminal narrowing and 6-7 millimeter canal.  At L4/5 there is broad-based disc bulge ligamentum hypertrophy, prominent epidural fat and moderate bilateral facet arthropathy.  The AP diameter the central thecal sac is 8 millimeters.  There is moderate lateral recess narrowing and left greater than right neural  foraminal narrowing.  At L5/S1 there is disc space narrowing, disc bulging superimposed left paracentral annular fissure, ligamentous hypertrophy and severe left greater than right facet hypertrophy.    MRI lumbar spine 04/18/2023  FINDINGS:  Alignment: Normal.     Vertebral column: Vertebral body heights are maintained.  No evidence of an acute fracture or aggressive marrow replacement process. Spinal canal appears diffusely small in caliber, likely on a developmental basis. Epic lipomatosis, most pronounced at L2-L4.  Multilevel disc degeneration, most pronounced at L5-S1.     Cord: Normal.  Conus terminates at L1.     Degenerative findings:     T12-L1: Disc is normal configuration.  Mild bilateral facet arthropathy.  No neural foraminal or spinal canal stenosis.  L1-L2: Disc is normal in configuration.  Mild bilateral facet arthropathy and ligamentum flavum thickening.  There is no neural foraminal stenosis.  There is no spinal canal stenosis.  L2-L3: Mild diffuse disc bulge.  Mild bilateral facet arthropathy and ligamentum flavum thickening.  Epidural lipomatosis.  Mild bilateral neural foraminal stenosis.  Moderate spinal canal stenosis.  L3-L4: Diffuse disc bulge.  Mild-to-moderate bilateral facet arthropathy.  Epidural lipomatosis.  Mild bilateral neural foraminal stenosis.  Severe spinal canal stenosis.  L4-L5: Diffuse disc bulge with small central disc protrusion.  Moderate bilateral facet arthropathy.  Ligamentum flavum thickening.  Epidural lipomatosis.  Moderate left and mild right neural foraminal stenosis.  Moderate spinal canal stenosis.  L5-S1: Diffuse disc bulge.  Moderate left greater than right facet arthropathy.  Ligamentum flavum thickening.  Severe left and moderate right neural foraminal stenosis.  There is no spinal canal stenosis.    Assessment:  Philip Salinas is a 42 y.o. year old male patient who has a past medical history of Cervical disc disease, Disc degeneration, lumbar, Disc  disease, degenerative, cervical, Hypertension, and Lumbar disc disease. He presents in referral from Dr. Ana Valero for back and neck pain.     1. Lumbar radiculopathy        2. Lumbar spondylosis        3. Spinal stenosis of lumbar region with neurogenic claudication        4. Opioid contract exists  [facility] Drug Screen              Plan:  1. I have reviewed his case with Dr. Bartholomew and we have collected a urine drug screen and the patient has signed an opioid agreement today.  We discussed that opioids are not a long-term plan but while he is waiting to have lumbar surgery in the future Dr. Bartholomew will provide this medication for him.  He provided prescriptions for hydrocodone-acetaminophen 5/325 mg up to 3 times daily as needed for pain.  We discussed the concept of tolerance and addiction.  2. Follow-up in 3 months or sooner as needed.

## 2023-10-20 LAB
6MAM UR QL: NOT DETECTED
7AMINOCLONAZEPAM UR QL: NOT DETECTED
A-OH ALPRAZ UR QL: NOT DETECTED
ALPHA-OH-MIDAZOLAM: NOT DETECTED
ALPRAZ UR QL: NOT DETECTED
AMPHET UR QL SCN: NOT DETECTED
ANNOTATION COMMENT IMP: NORMAL
ANNOTATION COMMENT IMP: NORMAL
BARBITURATES UR QL: NOT DETECTED
BUPRENORPHINE UR QL: NOT DETECTED
BZE UR QL: NOT DETECTED
CARBOXYTHC UR QL: NOT DETECTED
CARISOPRODOL UR QL: NOT DETECTED
CLONAZEPAM UR QL: NOT DETECTED
CODEINE UR QL: NOT DETECTED
CREAT UR-MCNC: 74.6 MG/DL (ref 20–400)
DIAZEPAM UR QL: NOT DETECTED
ETHYL GLUCURONIDE UR QL: NOT DETECTED
FENTANYL UR QL: NOT DETECTED
GABAPENTIN: NOT DETECTED
HYDROCODONE UR QL: PRESENT
HYDROMORPHONE UR QL: PRESENT
LORAZEPAM UR QL: NOT DETECTED
MDA UR QL: NOT DETECTED
MDEA UR QL: NOT DETECTED
MDMA UR QL: NOT DETECTED
ME-PHENIDATE UR QL: NOT DETECTED
METHADONE UR QL: NOT DETECTED
METHAMPHET UR QL: NOT DETECTED
MIDAZOLAM UR QL SCN: NOT DETECTED
MORPHINE UR QL: NOT DETECTED
NALOXONE: NOT DETECTED
NORBUPRENORPHINE UR QL CFM: NOT DETECTED
NORDIAZEPAM UR QL: NOT DETECTED
NORFENTANYL UR QL: NOT DETECTED
NORHYDROCODONE UR QL CFM: PRESENT
NORMEPERIDINE UR QL CFM: NOT DETECTED
NOROXYCODONE UR QL CFM: NOT DETECTED
NOROXYMORPHONE UR QL SCN: NOT DETECTED
OXAZEPAM UR QL: NOT DETECTED
OXYCODONE UR QL: NOT DETECTED
OXYMORPHONE UR QL: NOT DETECTED
PATHOLOGY STUDY: NORMAL
PCP UR QL: NOT DETECTED
PHENTERMINE UR QL: NOT DETECTED
PREGABALIN: NOT DETECTED
SERVICE CMNT-IMP: NORMAL
TAPENTADOL UR QL SCN: NOT DETECTED
TAPENTADOL UR QL SCN: NOT DETECTED
TEMAZEPAM UR QL: NOT DETECTED
TRAMADOL UR QL: NOT DETECTED
ZOLPIDEM METABOLITE: NOT DETECTED
ZOLPIDEM UR QL: NOT DETECTED

## 2024-01-22 DIAGNOSIS — M51.36 DDD (DEGENERATIVE DISC DISEASE), LUMBAR: ICD-10-CM

## 2024-01-23 RX ORDER — HYDROCODONE BITARTRATE AND ACETAMINOPHEN 5; 325 MG/1; MG/1
1 TABLET ORAL EVERY 8 HOURS PRN
Qty: 90 TABLET | Refills: 0 | Status: SHIPPED | OUTPATIENT
Start: 2024-01-23 | End: 2024-02-15 | Stop reason: SDUPTHER

## 2024-02-15 ENCOUNTER — OFFICE VISIT (OUTPATIENT)
Dept: PAIN MEDICINE | Facility: CLINIC | Age: 44
End: 2024-02-15
Payer: COMMERCIAL

## 2024-02-15 VITALS
HEIGHT: 74 IN | DIASTOLIC BLOOD PRESSURE: 95 MMHG | HEART RATE: 77 BPM | SYSTOLIC BLOOD PRESSURE: 140 MMHG | WEIGHT: 301.13 LBS | BODY MASS INDEX: 38.65 KG/M2

## 2024-02-15 DIAGNOSIS — M54.16 LUMBAR RADICULOPATHY: Primary | ICD-10-CM

## 2024-02-15 DIAGNOSIS — Z79.891 OPIOID CONTRACT EXISTS: ICD-10-CM

## 2024-02-15 DIAGNOSIS — M51.36 DDD (DEGENERATIVE DISC DISEASE), LUMBAR: ICD-10-CM

## 2024-02-15 DIAGNOSIS — M48.062 SPINAL STENOSIS OF LUMBAR REGION WITH NEUROGENIC CLAUDICATION: ICD-10-CM

## 2024-02-15 PROCEDURE — 99214 OFFICE O/P EST MOD 30 MIN: CPT | Mod: S$GLB,,, | Performed by: PHYSICIAN ASSISTANT

## 2024-02-15 PROCEDURE — 3077F SYST BP >= 140 MM HG: CPT | Mod: CPTII,S$GLB,, | Performed by: PHYSICIAN ASSISTANT

## 2024-02-15 PROCEDURE — 3080F DIAST BP >= 90 MM HG: CPT | Mod: CPTII,S$GLB,, | Performed by: PHYSICIAN ASSISTANT

## 2024-02-15 PROCEDURE — 99999 PR PBB SHADOW E&M-EST. PATIENT-LVL III: CPT | Mod: PBBFAC,,, | Performed by: PHYSICIAN ASSISTANT

## 2024-02-15 PROCEDURE — 1159F MED LIST DOCD IN RCRD: CPT | Mod: CPTII,S$GLB,, | Performed by: PHYSICIAN ASSISTANT

## 2024-02-15 PROCEDURE — 1160F RVW MEDS BY RX/DR IN RCRD: CPT | Mod: CPTII,S$GLB,, | Performed by: PHYSICIAN ASSISTANT

## 2024-02-15 PROCEDURE — 3008F BODY MASS INDEX DOCD: CPT | Mod: CPTII,S$GLB,, | Performed by: PHYSICIAN ASSISTANT

## 2024-02-15 RX ORDER — HYDROCODONE BITARTRATE AND ACETAMINOPHEN 5; 325 MG/1; MG/1
1 TABLET ORAL EVERY 8 HOURS PRN
Qty: 90 TABLET | Refills: 0 | Status: SHIPPED | OUTPATIENT
Start: 2024-04-22 | End: 2024-05-17 | Stop reason: SDUPTHER

## 2024-02-15 RX ORDER — IBUPROFEN 200 MG
800 TABLET ORAL EVERY 8 HOURS PRN
COMMUNITY

## 2024-02-15 RX ORDER — HYDROCODONE BITARTRATE AND ACETAMINOPHEN 5; 325 MG/1; MG/1
1 TABLET ORAL EVERY 8 HOURS PRN
Qty: 90 TABLET | Refills: 0 | Status: SHIPPED | OUTPATIENT
Start: 2024-02-22 | End: 2024-03-23

## 2024-02-15 RX ORDER — HYDROCODONE BITARTRATE AND ACETAMINOPHEN 5; 325 MG/1; MG/1
1 TABLET ORAL EVERY 8 HOURS PRN
Qty: 90 TABLET | Refills: 0 | Status: SHIPPED | OUTPATIENT
Start: 2024-03-23 | End: 2024-05-01

## 2024-02-15 NOTE — PROGRESS NOTES
This note was completed with dictation software and grammatical errors may exist.    Chief Complaint   Patient presents with    Low-back Pain    Neck Pain        HPI: Philip Salinas is a 43 y.o. year old male patient who has a past medical history of Cervical disc disease, Disc degeneration, lumbar, Disc disease, degenerative, cervical, Hypertension, and Lumbar disc disease. He presents in referral from No ref. provider found for back and neck pain.  He returns in follow-up today with low back and bilateral leg pain.  He denies any major changes.  His pain is worse with standing and walking with radiation down to the bilateral buttocks, posterior thighs, calves and feet.  He continues to take pain medicine with some relief.  He denies any new weakness or new numbness.  He denies bladder or bowel incontinence.    Initial history: The patient reports that he 1st injured his neck in 2006, this pain is located the base of his neck but feels that at this time it is tolerable and it is his back that is bothering him the most. He states that he has had back pain for over 10 years, states that it is hereditary.He has seen other specialists and has had several MRIs over the years.  He has done physical therapy numerous times.  While the pain has been severe, it has especially worsened in the last year and especially since this past October.  He states that the pain is across the bilateral low back and radiates into the posterior thighs, calves and into the feet.  It can be either leg.  States that he is fine when he is sitting but as soon as he starts to stand up and walk the pain becomes severe.  States that if he leans forward on a shopping cart he gets relief.  He denies any tyler weakness, no bowel or bladder incontinence.  He states that he can only stand for about 5 minutes before the pain becomes severe and he needs to sit down.    Pain intervention history:  He had done what sounds like lumbar medial branch blocks  in the past without relief.He is status post L5/S1 interlaminar epidural steroid injection on 12/21/2022 with 0% relief.  S/p bilateral L3/4 transforaminal injection on 02/03/2023 with only about a day of moderate relief at best. He is status post bilateral L5/S1 AMADEO on 05/26/2023 with 0% relief.    Spine surgeries:    Antineuropathics:  Has tried gabapentin in the past without any relief.  NSAIDs:  Mobic 15 mg, ibuprofen 1200 mg at a time   Physical therapy:  Has done multiple rounds of physical therapy in the past without long term relief.  Antidepressants:  Cymbalta 30  Muscle relaxers:  Opioids:  Antiplatelets/Anticoagulants:    ROS:  Reports back pain, difficulty sleeping and anxiety.  Balance of review of systems is negative.    Lab Results   Component Value Date    HGBA1C 5.7 (H) 03/13/2023       Lab Results   Component Value Date    WBC 8.11 09/19/2022    HGB 15.6 09/19/2022    HCT 47.2 09/19/2022    MCV 93 09/19/2022     09/19/2022             Past Medical History:   Diagnosis Date    Cervical disc disease     Disc degeneration, lumbar     Disc disease, degenerative, cervical     Hypertension     Lumbar disc disease        Past Surgical History:   Procedure Laterality Date    cervical rhizotomy      EPIDURAL STEROID INJECTION INTO LUMBAR SPINE N/A 12/21/2022    Procedure: Injection-steroid-epidural-lumbar L5/S1;  Surgeon: Jared Bartholomew MD;  Location: [facility] OR;  Service: Pain Management;  Laterality: N/A;    TRANSFORAMINAL EPIDURAL INJECTION OF STEROID Bilateral 2/3/2023    Procedure: Injection,steroid,epidural,transforaminal approach L3/4;  Surgeon: Jared Bartholomew MD;  Location: [facility] OR;  Service: Pain Management;  Laterality: Bilateral;    TRANSFORAMINAL EPIDURAL INJECTION OF STEROID Bilateral 5/26/2023    Procedure: Injection,steroid,epidural,transforaminal approach L5/S1 Bilat;  Surgeon: Jared Bartholomew MD;  Location: [facility] OR;  Service: Pain Management;  Laterality: Bilateral;  L5/S1  "Bilat       Social History     Socioeconomic History    Marital status:     Number of children: 4   Occupational History    Occupation: supervisor      Comment: [employer]   Tobacco Use    Smoking status: Never    Smokeless tobacco: Never   Substance and Sexual Activity    Alcohol use: No    Drug use: No    Sexual activity: Yes     Partners: Female         Medications/Allergies: See med card    Vitals:    02/15/24 0739   BP: (!) 140/95   Pulse: 77   Weight: (!) 136.6 kg (301 lb 2.4 oz)   Height: 6' 2" (1.88 m)   PainSc:   7   PainLoc: Back       Body mass index is 38.67 kg/m².        2/15/2024     7:40 AM 10/16/2023     4:06 PM   Last 3 PDI Scores   Pain Disability Index (PDI) 53 60         Physical exam:  Gen: A and O x3, pleasant, well-groomed  Skin: No rashes or obvious lesions  HEENT: PERRLA, no obvious deformities on ears or in canals. Trachea midline.  CVS: Regular rate and rhythm, normal palpable pulses.  Resp:No increased work of breathing, symmetrical chest rise.  Abdomen: Soft, NT/ND.  Musculoskeletal: No antalgic gait.     Neuro:    Iliopsoas Quadriceps Knee  Flexion Tibialis  anterior Gastro- cnemius EHL   Lower: R 5/5 5/5 5/5 5/5 5/5 5/5    L 5/5 5/5 5/5 5/5 5/5 5/5      Left  Right    Patellar DTR 1+ 1+   Achilles DTR 0+ 0+                    Intact and symmetrical to light touch and pinprick in L1-S1 dermatomes bilaterally.    Lumbar spine:  Lumbar spine: ROM is mildly reduced with flexion with slight increased pain, moderately reduced with extension with increased pain  Scottie's test causes no increased pain on either side.    Supine straight leg raise is negative bilaterally.    Internal and external rotation of the hip causes no increased pain on either side.  Myofascial exam: No tenderness to palpation across lumbar paraspinous muscles.      Imagin18 Xray L-spine:  Trace retrolisthesis of L4 on L5.  Alignment is otherwise within normal limits.  Vertebral body heights are " maintained.  No acute fracture identified.  No high-grade osseous foraminal narrowing on oblique views.  Minor multilevel endplate changes.  Disc spaces are preserved.  No prevertebral soft tissue abnormality.    7/22/11 MRI C-spine:    1. MINIMAL C3-4 DISC BULGE WITH SEVERE LEFT AND MODERATE RIGHT FORAMINAL STENOSIS.     2. SEVERE BILATERAL FORAMINAL STENOSIS AT THE C4-5 LEVEL WHICH IS GREATER ON THE RIGHT THAN ON THE LEFT.     3. C5-6 MODERATE BILATERAL FORAMINAL STENOSIS.     4. MINIMAL C6-7 DISC BULGE WITH SEVERE LEFT AND MODERATE RIGHT FORAMINAL STENOSIS.     5. MILD BILATERAL FORAMINAL STENOSIS AT THE C7-T1 LEVEL.       X-ray lumbar spine 1/28/22:  There is grade 1 retrolisthesis of L2 on L3, L3 on L4 and L4 on L5.    MRI lumbar spine 01/28/2022:  Report only.  The lumbar spine canal demonstrates narrowed appearance likely on a developmental basis.  There is prominent epidural fat throughout the lumbar spine.  L1/2 no canal stenosis.  L2/3 there is mild disc desiccation and disc bulging, mild facet arthrosis.  There is mild bilateral neuroforaminal narrowing at this level.  At L3/4 there is a moderate broad-based disc bulge with mild-to-moderate bilateral facet arthrosis.  There is also slightly prominent epidural fat which contributes to thecal sac narrowing.  There is mild bilateral foraminal narrowing and 6-7 millimeter canal.  At L4/5 there is broad-based disc bulge ligamentum hypertrophy, prominent epidural fat and moderate bilateral facet arthropathy.  The AP diameter the central thecal sac is 8 millimeters.  There is moderate lateral recess narrowing and left greater than right neural foraminal narrowing.  At L5/S1 there is disc space narrowing, disc bulging superimposed left paracentral annular fissure, ligamentous hypertrophy and severe left greater than right facet hypertrophy.    MRI lumbar spine 04/18/2023  FINDINGS:  Alignment: Normal.     Vertebral column: Vertebral body heights are maintained.   No evidence of an acute fracture or aggressive marrow replacement process. Spinal canal appears diffusely small in caliber, likely on a developmental basis. Epic lipomatosis, most pronounced at L2-L4.  Multilevel disc degeneration, most pronounced at L5-S1.     Cord: Normal.  Conus terminates at L1.     Degenerative findings:     T12-L1: Disc is normal configuration.  Mild bilateral facet arthropathy.  No neural foraminal or spinal canal stenosis.  L1-L2: Disc is normal in configuration.  Mild bilateral facet arthropathy and ligamentum flavum thickening.  There is no neural foraminal stenosis.  There is no spinal canal stenosis.  L2-L3: Mild diffuse disc bulge.  Mild bilateral facet arthropathy and ligamentum flavum thickening.  Epidural lipomatosis.  Mild bilateral neural foraminal stenosis.  Moderate spinal canal stenosis.  L3-L4: Diffuse disc bulge.  Mild-to-moderate bilateral facet arthropathy.  Epidural lipomatosis.  Mild bilateral neural foraminal stenosis.  Severe spinal canal stenosis.  L4-L5: Diffuse disc bulge with small central disc protrusion.  Moderate bilateral facet arthropathy.  Ligamentum flavum thickening.  Epidural lipomatosis.  Moderate left and mild right neural foraminal stenosis.  Moderate spinal canal stenosis.  L5-S1: Diffuse disc bulge.  Moderate left greater than right facet arthropathy.  Ligamentum flavum thickening.  Severe left and moderate right neural foraminal stenosis.  There is no spinal canal stenosis.    Assessment:  Philip Salinas is a 43 y.o. year old male patient who has a past medical history of Cervical disc disease, Disc degeneration, lumbar, Disc disease, degenerative, cervical, Hypertension, and Lumbar disc disease. He presents in referral from Dr. Ana Valero for back and neck pain.     1. Lumbar radiculopathy        2. Spinal stenosis of lumbar region with neurogenic claudication        3. Opioid contract exists              Plan:  1. Dr. Bartholomew provided  prescriptions for hydrocodone-acetaminophen 5/325 mg 3 times daily as needed for pain. I have reviewed the [place] Board of Pharmacy website and there are no abberancies.    2. If his symptoms worsen or if his job allows he will follow-up with Dr. Stanley for surgical intervention.  3. Follow-up in 3 months or sooner as needed.

## 2024-05-17 ENCOUNTER — OFFICE VISIT (OUTPATIENT)
Dept: PAIN MEDICINE | Facility: CLINIC | Age: 44
End: 2024-05-17
Payer: COMMERCIAL

## 2024-05-17 VITALS
HEART RATE: 86 BPM | HEIGHT: 74 IN | WEIGHT: 311.06 LBS | DIASTOLIC BLOOD PRESSURE: 88 MMHG | SYSTOLIC BLOOD PRESSURE: 128 MMHG | BODY MASS INDEX: 39.92 KG/M2

## 2024-05-17 DIAGNOSIS — M48.062 SPINAL STENOSIS OF LUMBAR REGION WITH NEUROGENIC CLAUDICATION: ICD-10-CM

## 2024-05-17 DIAGNOSIS — M54.16 LUMBAR RADICULOPATHY: Primary | ICD-10-CM

## 2024-05-17 DIAGNOSIS — M51.36 DDD (DEGENERATIVE DISC DISEASE), LUMBAR: ICD-10-CM

## 2024-05-17 DIAGNOSIS — Z79.891 OPIOID CONTRACT EXISTS: ICD-10-CM

## 2024-05-17 PROCEDURE — 4010F ACE/ARB THERAPY RXD/TAKEN: CPT | Mod: CPTII,S$GLB,, | Performed by: PHYSICIAN ASSISTANT

## 2024-05-17 PROCEDURE — 1160F RVW MEDS BY RX/DR IN RCRD: CPT | Mod: CPTII,S$GLB,, | Performed by: PHYSICIAN ASSISTANT

## 2024-05-17 PROCEDURE — 99999 PR PBB SHADOW E&M-EST. PATIENT-LVL III: CPT | Mod: PBBFAC,,, | Performed by: PHYSICIAN ASSISTANT

## 2024-05-17 PROCEDURE — 1159F MED LIST DOCD IN RCRD: CPT | Mod: CPTII,S$GLB,, | Performed by: PHYSICIAN ASSISTANT

## 2024-05-17 PROCEDURE — 99214 OFFICE O/P EST MOD 30 MIN: CPT | Mod: S$GLB,,, | Performed by: PHYSICIAN ASSISTANT

## 2024-05-17 PROCEDURE — 3074F SYST BP LT 130 MM HG: CPT | Mod: CPTII,S$GLB,, | Performed by: PHYSICIAN ASSISTANT

## 2024-05-17 PROCEDURE — 3008F BODY MASS INDEX DOCD: CPT | Mod: CPTII,S$GLB,, | Performed by: PHYSICIAN ASSISTANT

## 2024-05-17 PROCEDURE — 3079F DIAST BP 80-89 MM HG: CPT | Mod: CPTII,S$GLB,, | Performed by: PHYSICIAN ASSISTANT

## 2024-05-17 RX ORDER — HYDROCODONE BITARTRATE AND ACETAMINOPHEN 5; 325 MG/1; MG/1
1 TABLET ORAL EVERY 8 HOURS PRN
Qty: 90 TABLET | Refills: 0 | Status: SHIPPED | OUTPATIENT
Start: 2024-05-23 | End: 2024-06-22

## 2024-05-17 RX ORDER — HYDROCODONE BITARTRATE AND ACETAMINOPHEN 5; 325 MG/1; MG/1
1 TABLET ORAL EVERY 8 HOURS PRN
Qty: 90 TABLET | Refills: 0 | Status: SHIPPED | OUTPATIENT
Start: 2024-06-22 | End: 2024-07-22

## 2024-05-17 RX ORDER — HYDROCODONE BITARTRATE AND ACETAMINOPHEN 5; 325 MG/1; MG/1
1 TABLET ORAL EVERY 8 HOURS PRN
Qty: 90 TABLET | Refills: 0 | Status: SHIPPED | OUTPATIENT
Start: 2024-07-22 | End: 2024-08-21

## 2024-05-24 NOTE — PROGRESS NOTES
This note was completed with dictation software and grammatical errors may exist.    Chief Complaint   Patient presents with    Low-back Pain     Bilateral more so on the left side       Neck Pain        HPI: Philip Salinas is a 43 y.o. year old male patient who has a past medical history of Cervical disc disease, Disc degeneration, lumbar, Disc disease, degenerative, cervical, Hypertension, and Lumbar disc disease. He presents in referral from No ref. provider found for back and neck pain.  He returns in follow-up today with low back and bilateral leg pain.  He does feel that this is getting worse but it is still manageable to a certain extent.  The pain is worse with standing and improved with sitting and pain medication.  He reports intermittent weakness and intermittent tingling in his legs with prolonged standing.  He denies numbness or incontinence.      Initial history: The patient reports that he 1st injured his neck in 2006, this pain is located the base of his neck but feels that at this time it is tolerable and it is his back that is bothering him the most. He states that he has had back pain for over 10 years, states that it is hereditary.He has seen other specialists and has had several MRIs over the years.  He has done physical therapy numerous times.  While the pain has been severe, it has especially worsened in the last year and especially since this past October.  He states that the pain is across the bilateral low back and radiates into the posterior thighs, calves and into the feet.  It can be either leg.  States that he is fine when he is sitting but as soon as he starts to stand up and walk the pain becomes severe.  States that if he leans forward on a shopping cart he gets relief.  He denies any tyler weakness, no bowel or bladder incontinence.  He states that he can only stand for about 5 minutes before the pain becomes severe and he needs to sit down.    Pain intervention history:  He had  done what sounds like lumbar medial branch blocks in the past without relief.He is status post L5/S1 interlaminar epidural steroid injection on 12/21/2022 with 0% relief.  S/p bilateral L3/4 transforaminal injection on 02/03/2023 with only about a day of moderate relief at best. He is status post bilateral L5/S1 AAMDEO on 05/26/2023 with 0% relief.    Spine surgeries:    Antineuropathics:  Has tried gabapentin in the past without any relief.  NSAIDs:  Mobic 15 mg, ibuprofen 1200 mg at a time   Physical therapy:  Has done multiple rounds of physical therapy in the past without long term relief.  Antidepressants:  Cymbalta 30  Muscle relaxers:  Opioids:  Antiplatelets/Anticoagulants:    ROS:  Reports back pain, difficulty sleeping and anxiety.  Balance of review of systems is negative.    Lab Results   Component Value Date    HGBA1C 5.7 (H) 03/13/2023       Lab Results   Component Value Date    WBC 8.11 09/19/2022    HGB 15.6 09/19/2022    HCT 47.2 09/19/2022    MCV 93 09/19/2022     09/19/2022             Past Medical History:   Diagnosis Date    Cervical disc disease     Disc degeneration, lumbar     Disc disease, degenerative, cervical     Hypertension     Lumbar disc disease        Past Surgical History:   Procedure Laterality Date    cervical rhizotomy      EPIDURAL STEROID INJECTION INTO LUMBAR SPINE N/A 12/21/2022    Procedure: Injection-steroid-epidural-lumbar L5/S1;  Surgeon: Jared Bartholomew MD;  Location: [facility] OR;  Service: Pain Management;  Laterality: N/A;    TRANSFORAMINAL EPIDURAL INJECTION OF STEROID Bilateral 2/3/2023    Procedure: Injection,steroid,epidural,transforaminal approach L3/4;  Surgeon: Jared Bartholomew MD;  Location: [facility] OR;  Service: Pain Management;  Laterality: Bilateral;    TRANSFORAMINAL EPIDURAL INJECTION OF STEROID Bilateral 5/26/2023    Procedure: Injection,steroid,epidural,transforaminal approach L5/S1 Bilat;  Surgeon: Jared Bartholomew MD;  Location: [facility] OR;   "Service: Pain Management;  Laterality: Bilateral;  L5/S1 Bilat       Social History     Socioeconomic History    Marital status:     Number of children: 4   Occupational History    Occupation: supervisor      Comment: [employer]   Tobacco Use    Smoking status: Never    Smokeless tobacco: Never   Substance and Sexual Activity    Alcohol use: No    Drug use: No    Sexual activity: Yes     Partners: Female         Medications/Allergies: See med card    Vitals:    24 1554   BP: 128/88   Pulse: 86   Weight: (!) 141.1 kg (311 lb 1.1 oz)   Height: 6' 2" (1.88 m)   PainSc:   7   PainLoc: Back       Body mass index is 39.94 kg/m².        2024     3:55 PM 2/15/2024     7:40 AM 10/16/2023     4:06 PM   Last 3 PDI Scores   Pain Disability Index (PDI) 44 53 60         Physical exam:  Gen: A and O x3, pleasant, well-groomed  Skin: No rashes or obvious lesions  HEENT: PERRLA, no obvious deformities on ears or in canals. Trachea midline.  CVS: Regular rate and rhythm, normal palpable pulses.  Resp:No increased work of breathing, symmetrical chest rise.  Abdomen: Soft, NT/ND.  Musculoskeletal: No antalgic gait.     Neuro:    Iliopsoas Quadriceps Knee  Flexion Tibialis  anterior Gastro- cnemius EHL   Lower: R 5/5 5/5 5/5 5/5 5/5 5/5    L 5/5 5/5 5/5 5/5 5/5 5/5      Left  Right    Patellar DTR 1+ 1+   Achilles DTR 0+ 0+                    Intact and symmetrical to light touch and pinprick in L1-S1 dermatomes bilaterally.    Lumbar spine:  Lumbar spine: ROM is mildly reduced with flexion with slight increased pain, moderately reduced with extension with increased pain  Scottie's test causes no increased pain on either side.    Supine straight leg raise is negative bilaterally.    Internal and external rotation of the hip causes no increased pain on either side.  Myofascial exam: No tenderness to palpation across lumbar paraspinous muscles.      Imagin18 Xray L-spine:  Trace retrolisthesis of L4 on L5.  " Alignment is otherwise within normal limits.  Vertebral body heights are maintained.  No acute fracture identified.  No high-grade osseous foraminal narrowing on oblique views.  Minor multilevel endplate changes.  Disc spaces are preserved.  No prevertebral soft tissue abnormality.    7/22/11 MRI C-spine:    1. MINIMAL C3-4 DISC BULGE WITH SEVERE LEFT AND MODERATE RIGHT FORAMINAL STENOSIS.     2. SEVERE BILATERAL FORAMINAL STENOSIS AT THE C4-5 LEVEL WHICH IS GREATER ON THE RIGHT THAN ON THE LEFT.     3. C5-6 MODERATE BILATERAL FORAMINAL STENOSIS.     4. MINIMAL C6-7 DISC BULGE WITH SEVERE LEFT AND MODERATE RIGHT FORAMINAL STENOSIS.     5. MILD BILATERAL FORAMINAL STENOSIS AT THE C7-T1 LEVEL.       X-ray lumbar spine 1/28/22:  There is grade 1 retrolisthesis of L2 on L3, L3 on L4 and L4 on L5.    MRI lumbar spine 01/28/2022:  Report only.  The lumbar spine canal demonstrates narrowed appearance likely on a developmental basis.  There is prominent epidural fat throughout the lumbar spine.  L1/2 no canal stenosis.  L2/3 there is mild disc desiccation and disc bulging, mild facet arthrosis.  There is mild bilateral neuroforaminal narrowing at this level.  At L3/4 there is a moderate broad-based disc bulge with mild-to-moderate bilateral facet arthrosis.  There is also slightly prominent epidural fat which contributes to thecal sac narrowing.  There is mild bilateral foraminal narrowing and 6-7 millimeter canal.  At L4/5 there is broad-based disc bulge ligamentum hypertrophy, prominent epidural fat and moderate bilateral facet arthropathy.  The AP diameter the central thecal sac is 8 millimeters.  There is moderate lateral recess narrowing and left greater than right neural foraminal narrowing.  At L5/S1 there is disc space narrowing, disc bulging superimposed left paracentral annular fissure, ligamentous hypertrophy and severe left greater than right facet hypertrophy.    MRI lumbar spine  04/18/2023  FINDINGS:  Alignment: Normal.     Vertebral column: Vertebral body heights are maintained.  No evidence of an acute fracture or aggressive marrow replacement process. Spinal canal appears diffusely small in caliber, likely on a developmental basis. Epic lipomatosis, most pronounced at L2-L4.  Multilevel disc degeneration, most pronounced at L5-S1.     Cord: Normal.  Conus terminates at L1.     Degenerative findings:     T12-L1: Disc is normal configuration.  Mild bilateral facet arthropathy.  No neural foraminal or spinal canal stenosis.  L1-L2: Disc is normal in configuration.  Mild bilateral facet arthropathy and ligamentum flavum thickening.  There is no neural foraminal stenosis.  There is no spinal canal stenosis.  L2-L3: Mild diffuse disc bulge.  Mild bilateral facet arthropathy and ligamentum flavum thickening.  Epidural lipomatosis.  Mild bilateral neural foraminal stenosis.  Moderate spinal canal stenosis.  L3-L4: Diffuse disc bulge.  Mild-to-moderate bilateral facet arthropathy.  Epidural lipomatosis.  Mild bilateral neural foraminal stenosis.  Severe spinal canal stenosis.  L4-L5: Diffuse disc bulge with small central disc protrusion.  Moderate bilateral facet arthropathy.  Ligamentum flavum thickening.  Epidural lipomatosis.  Moderate left and mild right neural foraminal stenosis.  Moderate spinal canal stenosis.  L5-S1: Diffuse disc bulge.  Moderate left greater than right facet arthropathy.  Ligamentum flavum thickening.  Severe left and moderate right neural foraminal stenosis.  There is no spinal canal stenosis.    Assessment:  Philip Salinas is a 43 y.o. year old male patient who has a past medical history of Cervical disc disease, Disc degeneration, lumbar, Disc disease, degenerative, cervical, Hypertension, and Lumbar disc disease. He presents in referral from Dr. Ana Valero for back and neck pain.     1. Lumbar radiculopathy        2. Spinal stenosis of lumbar region with  neurogenic claudication        3. Opioid contract exists              Plan:  1. Dr. Bartholomew provided prescriptions for hydrocodone-acetaminophen 5/325 mg 3 times daily as needed for pain. I have reviewed the [place] Board of Pharmacy website and there are no abberancies.    2. If his symptoms worsen he will follow-up with Dr. Stanley for surgical intervention.  3. Follow-up in 3 months or sooner as needed.

## 2024-08-19 ENCOUNTER — OFFICE VISIT (OUTPATIENT)
Dept: PAIN MEDICINE | Facility: CLINIC | Age: 44
End: 2024-08-19
Payer: COMMERCIAL

## 2024-08-19 VITALS
HEART RATE: 79 BPM | SYSTOLIC BLOOD PRESSURE: 139 MMHG | DIASTOLIC BLOOD PRESSURE: 92 MMHG | BODY MASS INDEX: 33.74 KG/M2 | WEIGHT: 262.88 LBS | HEIGHT: 74 IN

## 2024-08-19 DIAGNOSIS — M47.812 CERVICAL SPONDYLOSIS: ICD-10-CM

## 2024-08-19 DIAGNOSIS — M51.36 DDD (DEGENERATIVE DISC DISEASE), LUMBAR: ICD-10-CM

## 2024-08-19 DIAGNOSIS — Z79.891 OPIOID CONTRACT EXISTS: ICD-10-CM

## 2024-08-19 DIAGNOSIS — M48.062 SPINAL STENOSIS OF LUMBAR REGION WITH NEUROGENIC CLAUDICATION: ICD-10-CM

## 2024-08-19 DIAGNOSIS — M54.16 LUMBAR RADICULOPATHY: Primary | ICD-10-CM

## 2024-08-19 PROCEDURE — 4010F ACE/ARB THERAPY RXD/TAKEN: CPT | Mod: CPTII,S$GLB,, | Performed by: PHYSICIAN ASSISTANT

## 2024-08-19 PROCEDURE — 1160F RVW MEDS BY RX/DR IN RCRD: CPT | Mod: CPTII,S$GLB,, | Performed by: PHYSICIAN ASSISTANT

## 2024-08-19 PROCEDURE — 3080F DIAST BP >= 90 MM HG: CPT | Mod: CPTII,S$GLB,, | Performed by: PHYSICIAN ASSISTANT

## 2024-08-19 PROCEDURE — 99214 OFFICE O/P EST MOD 30 MIN: CPT | Mod: S$GLB,,, | Performed by: PHYSICIAN ASSISTANT

## 2024-08-19 PROCEDURE — 3075F SYST BP GE 130 - 139MM HG: CPT | Mod: CPTII,S$GLB,, | Performed by: PHYSICIAN ASSISTANT

## 2024-08-19 PROCEDURE — 3008F BODY MASS INDEX DOCD: CPT | Mod: CPTII,S$GLB,, | Performed by: PHYSICIAN ASSISTANT

## 2024-08-19 PROCEDURE — 1159F MED LIST DOCD IN RCRD: CPT | Mod: CPTII,S$GLB,, | Performed by: PHYSICIAN ASSISTANT

## 2024-08-19 PROCEDURE — 99999 PR PBB SHADOW E&M-EST. PATIENT-LVL III: CPT | Mod: PBBFAC,,, | Performed by: PHYSICIAN ASSISTANT

## 2024-08-19 RX ORDER — HYDROCODONE BITARTRATE AND ACETAMINOPHEN 5; 325 MG/1; MG/1
1 TABLET ORAL EVERY 8 HOURS PRN
Qty: 90 TABLET | Refills: 0 | Status: SHIPPED | OUTPATIENT
Start: 2024-09-24 | End: 2024-10-24

## 2024-08-19 RX ORDER — HYDROCODONE BITARTRATE AND ACETAMINOPHEN 5; 325 MG/1; MG/1
1 TABLET ORAL EVERY 8 HOURS PRN
Qty: 90 TABLET | Refills: 0 | Status: SHIPPED | OUTPATIENT
Start: 2024-08-25 | End: 2024-09-24

## 2024-08-19 RX ORDER — HYDROCODONE BITARTRATE AND ACETAMINOPHEN 5; 325 MG/1; MG/1
1 TABLET ORAL EVERY 8 HOURS PRN
Qty: 90 TABLET | Refills: 0 | Status: SHIPPED | OUTPATIENT
Start: 2024-10-24 | End: 2024-11-23

## 2024-08-19 NOTE — TELEPHONE ENCOUNTER
Pt seen in clinic. Please send Rx. I have reviewed the [place] Board of Pharmacy website and there are no abberancies.

## 2024-08-19 NOTE — PROGRESS NOTES
This note was completed with dictation software and grammatical errors may exist.    Chief Complaint   Patient presents with    Low-back Pain        HPI: Philip Salinas is a 43 y.o. year old male patient who has a past medical history of Cervical disc disease, Disc degeneration, lumbar, Disc disease, degenerative, cervical, Hypertension, and Lumbar disc disease. He presents in referral from No ref. provider found for back and neck pain.  He returns in follow-up today with low back pain.  He denies any major changes to this.  His back pain will radiate to his legs with prolonged standing and he will develop intermittent weakness and intermittent tingling.  He also reports having some increasing neck pain with associated numbness in his hand.  Reports having cervical radiofrequency ablation procedures several years ago with Dr. Boris Thomas which gave him relief at that time.  Otherwise, he continues to take pain medication with relief.  He has lost a significant amount of weight since his last visit, reports he is going through a divorce but is in a better state of mind currently.    Initial history: The patient reports that he 1st injured his neck in 2006, this pain is located the base of his neck but feels that at this time it is tolerable and it is his back that is bothering him the most. He states that he has had back pain for over 10 years, states that it is hereditary.He has seen other specialists and has had several MRIs over the years.  He has done physical therapy numerous times.  While the pain has been severe, it has especially worsened in the last year and especially since this past October.  He states that the pain is across the bilateral low back and radiates into the posterior thighs, calves and into the feet.  It can be either leg.  States that he is fine when he is sitting but as soon as he starts to stand up and walk the pain becomes severe.  States that if he leans forward on a shopping cart  he gets relief.  He denies any tyler weakness, no bowel or bladder incontinence.  He states that he can only stand for about 5 minutes before the pain becomes severe and he needs to sit down.    Pain intervention history:  He had done what sounds like lumbar medial branch blocks in the past without relief.He is status post L5/S1 interlaminar epidural steroid injection on 12/21/2022 with 0% relief.  S/p bilateral L3/4 transforaminal injection on 02/03/2023 with only about a day of moderate relief at best. He is status post bilateral L5/S1 AMADEO on 05/26/2023 with 0% relief.    Spine surgeries:    Antineuropathics:  Has tried gabapentin in the past without any relief.  NSAIDs:  Mobic 15 mg, ibuprofen 1200 mg at a time   Physical therapy:  Has done multiple rounds of physical therapy in the past without long term relief.  Antidepressants:  Cymbalta 30  Muscle relaxers:  Opioids:  Antiplatelets/Anticoagulants:    ROS:  Reports back pain, difficulty sleeping and anxiety.  Balance of review of systems is negative.    Lab Results   Component Value Date    HGBA1C 5.7 (H) 03/13/2023       Lab Results   Component Value Date    WBC 8.11 09/19/2022    HGB 15.6 09/19/2022    HCT 47.2 09/19/2022    MCV 93 09/19/2022     09/19/2022             Past Medical History:   Diagnosis Date    Cervical disc disease     Disc degeneration, lumbar     Disc disease, degenerative, cervical     Hypertension     Lumbar disc disease        Past Surgical History:   Procedure Laterality Date    cervical rhizotomy      EPIDURAL STEROID INJECTION INTO LUMBAR SPINE N/A 12/21/2022    Procedure: Injection-steroid-epidural-lumbar L5/S1;  Surgeon: Jared Bartholomew MD;  Location: [facility] OR;  Service: Pain Management;  Laterality: N/A;    TRANSFORAMINAL EPIDURAL INJECTION OF STEROID Bilateral 2/3/2023    Procedure: Injection,steroid,epidural,transforaminal approach L3/4;  Surgeon: Jared Bartholomew MD;  Location: [facility] OR;  Service: Pain Management;   "Laterality: Bilateral;    TRANSFORAMINAL EPIDURAL INJECTION OF STEROID Bilateral 5/26/2023    Procedure: Injection,steroid,epidural,transforaminal approach L5/S1 Bilat;  Surgeon: Jared Bartholomew MD;  Location: [facility] OR;  Service: Pain Management;  Laterality: Bilateral;  L5/S1 Bilat       Social History     Socioeconomic History    Marital status: Other    Number of children: 4   Occupational History    Occupation: supervisor      Comment: [employer]   Tobacco Use    Smoking status: Never     Passive exposure: Past    Smokeless tobacco: Never   Substance and Sexual Activity    Alcohol use: No    Drug use: No    Sexual activity: Yes     Partners: Female         Medications/Allergies: See med card    Vitals:    08/19/24 0728   BP: (!) 139/92   Pulse: 79   Weight: 119.3 kg (262 lb 14.4 oz)   Height: 6' 2" (1.88 m)   PainSc:   6   PainLoc: Back       Body mass index is 33.75 kg/m².        8/19/2024     7:27 AM 5/17/2024     3:55 PM 2/15/2024     7:40 AM   Last 3 PDI Scores   Pain Disability Index (PDI) 54 44 53         Physical exam:  Gen: A and O x3, pleasant, well-groomed  Skin: No rashes or obvious lesions  HEENT: PERRLA, no obvious deformities on ears or in canals. Trachea midline.  CVS: Regular rate and rhythm, normal palpable pulses.  Resp:No increased work of breathing, symmetrical chest rise.  Abdomen: Soft, NT/ND.  Musculoskeletal: No antalgic gait.     Neuro:    Iliopsoas Quadriceps Knee  Flexion Tibialis  anterior Gastro- cnemius EHL   Lower: R 5/5 5/5 5/5 5/5 5/5 5/5    L 5/5 5/5 5/5 5/5 5/5 5/5      Left  Right    Patellar DTR 1+ 1+   Achilles DTR 0+ 0+                    Intact and symmetrical to light touch and pinprick in L1-S1 dermatomes bilaterally.    Lumbar spine:  Lumbar spine: ROM is mildly reduced with flexion with slight increased pain, moderately reduced with extension with increased pain  Scottie's test causes no increased pain on either side.    Supine straight leg raise is negative " bilaterally.    Internal and external rotation of the hip causes no increased pain on either side.  Myofascial exam: No tenderness to palpation across lumbar paraspinous muscles.      Imagin18 Xray L-spine:  Trace retrolisthesis of L4 on L5.  Alignment is otherwise within normal limits.  Vertebral body heights are maintained.  No acute fracture identified.  No high-grade osseous foraminal narrowing on oblique views.  Minor multilevel endplate changes.  Disc spaces are preserved.  No prevertebral soft tissue abnormality.    11 MRI C-spine:    1. MINIMAL C3-4 DISC BULGE WITH SEVERE LEFT AND MODERATE RIGHT FORAMINAL STENOSIS.     2. SEVERE BILATERAL FORAMINAL STENOSIS AT THE C4-5 LEVEL WHICH IS GREATER ON THE RIGHT THAN ON THE LEFT.     3. C5-6 MODERATE BILATERAL FORAMINAL STENOSIS.     4. MINIMAL C6-7 DISC BULGE WITH SEVERE LEFT AND MODERATE RIGHT FORAMINAL STENOSIS.     5. MILD BILATERAL FORAMINAL STENOSIS AT THE C7-T1 LEVEL.       X-ray lumbar spine 22:  There is grade 1 retrolisthesis of L2 on L3, L3 on L4 and L4 on L5.    MRI lumbar spine 2022:  Report only.  The lumbar spine canal demonstrates narrowed appearance likely on a developmental basis.  There is prominent epidural fat throughout the lumbar spine.  L1/2 no canal stenosis.  L2/3 there is mild disc desiccation and disc bulging, mild facet arthrosis.  There is mild bilateral neuroforaminal narrowing at this level.  At L3/4 there is a moderate broad-based disc bulge with mild-to-moderate bilateral facet arthrosis.  There is also slightly prominent epidural fat which contributes to thecal sac narrowing.  There is mild bilateral foraminal narrowing and 6-7 millimeter canal.  At L4/5 there is broad-based disc bulge ligamentum hypertrophy, prominent epidural fat and moderate bilateral facet arthropathy.  The AP diameter the central thecal sac is 8 millimeters.  There is moderate lateral recess narrowing and left greater than right neural  foraminal narrowing.  At L5/S1 there is disc space narrowing, disc bulging superimposed left paracentral annular fissure, ligamentous hypertrophy and severe left greater than right facet hypertrophy.    MRI lumbar spine 04/18/2023  FINDINGS:  Alignment: Normal.     Vertebral column: Vertebral body heights are maintained.  No evidence of an acute fracture or aggressive marrow replacement process. Spinal canal appears diffusely small in caliber, likely on a developmental basis. Epic lipomatosis, most pronounced at L2-L4.  Multilevel disc degeneration, most pronounced at L5-S1.     Cord: Normal.  Conus terminates at L1.     Degenerative findings:     T12-L1: Disc is normal configuration.  Mild bilateral facet arthropathy.  No neural foraminal or spinal canal stenosis.  L1-L2: Disc is normal in configuration.  Mild bilateral facet arthropathy and ligamentum flavum thickening.  There is no neural foraminal stenosis.  There is no spinal canal stenosis.  L2-L3: Mild diffuse disc bulge.  Mild bilateral facet arthropathy and ligamentum flavum thickening.  Epidural lipomatosis.  Mild bilateral neural foraminal stenosis.  Moderate spinal canal stenosis.  L3-L4: Diffuse disc bulge.  Mild-to-moderate bilateral facet arthropathy.  Epidural lipomatosis.  Mild bilateral neural foraminal stenosis.  Severe spinal canal stenosis.  L4-L5: Diffuse disc bulge with small central disc protrusion.  Moderate bilateral facet arthropathy.  Ligamentum flavum thickening.  Epidural lipomatosis.  Moderate left and mild right neural foraminal stenosis.  Moderate spinal canal stenosis.  L5-S1: Diffuse disc bulge.  Moderate left greater than right facet arthropathy.  Ligamentum flavum thickening.  Severe left and moderate right neural foraminal stenosis.  There is no spinal canal stenosis.    Assessment:  Philip Salinas is a 43 y.o. year old male patient who has a past medical history of Cervical disc disease, Disc degeneration, lumbar, Disc  disease, degenerative, cervical, Hypertension, and Lumbar disc disease. He presents in referral from Dr. Ana Valero for back and neck pain.     1. Lumbar radiculopathy        2. Spinal stenosis of lumbar region with neurogenic claudication        3. DDD (degenerative disc disease), lumbar        4. Cervical spondylosis        5. Opioid contract exists              Plan:  1. Dr. Bartholomew provided prescriptions for hydrocodone-acetaminophen 5/325 mg 3 times daily as needed for pain. I have reviewed the [place] Board of [employer]acy website and there are no abberancies.    2. If his lumbar symptoms worsen he will follow-up with Dr. Stanley for surgical intervention.  3. He signed a release to get cervical procedure reports from Dr. Thomas's office.  He reports relief with radiofrequency ablation in the past.  We discussed that due to how long it has been he may need to undergo cervical diagnostic medial branch nerve blocks in the future.  His symptoms are not to the point where he needs a procedure but I would like to get these records in advance and we may need to order a new cervical spine MRI as well at his next visit.  3. Follow-up in 3 months or sooner as needed.

## 2024-10-08 ENCOUNTER — TELEPHONE (OUTPATIENT)
Dept: PHARMACY | Facility: CLINIC | Age: 44
End: 2024-10-08
Payer: COMMERCIAL

## 2024-10-08 ENCOUNTER — TELEPHONE (OUTPATIENT)
Dept: PHARMACY | Facility: CLINIC | Age: 44
End: 2024-10-08

## 2024-10-09 NOTE — TELEPHONE ENCOUNTER
[facility]/Population Health Chart Review & Patient Outreach Details For Medication Adherence Project    Reason for Outreach Encounter: 3rd Party payor non-compliance report ([employer], [employer], C, etc)  2.  Patient Outreach Method: Reviewed patient chart   3.   Medication in question:   Hypertension Medications               enalapril (VASOTEC) 5 MG tablet Take 1 tablet (5 mg total) by mouth once daily.                 ENALAPRIL  last filled  8/26 for 90 day supply      4.  Reviewed and or Updates Made To: Patient Chart  5. Outreach Outcomes and/or actions taken: Patient filled medication and is on track to be adherent  Additional Notes:

## 2024-10-10 ENCOUNTER — TELEPHONE (OUTPATIENT)
Dept: PAIN MEDICINE | Facility: CLINIC | Age: 44
End: 2024-10-10
Payer: COMMERCIAL

## 2024-10-10 NOTE — TELEPHONE ENCOUNTER
Called to reschedule his appt but he did not answer and he does not have a voicemail. Will re-attempt to call.

## 2024-10-28 ENCOUNTER — PATIENT MESSAGE (OUTPATIENT)
Dept: PAIN MEDICINE | Facility: CLINIC | Age: 44
End: 2024-10-28
Payer: COMMERCIAL

## 2024-10-28 DIAGNOSIS — Z79.891 OPIOID CONTRACT EXISTS: Primary | ICD-10-CM

## 2024-10-30 RX ORDER — HYDROCODONE BITARTRATE AND ACETAMINOPHEN 5; 325 MG/1; MG/1
1 TABLET ORAL EVERY 8 HOURS PRN
Qty: 90 TABLET | Refills: 0 | Status: SHIPPED | OUTPATIENT
Start: 2024-10-30

## 2024-11-19 ENCOUNTER — OFFICE VISIT (OUTPATIENT)
Dept: PAIN MEDICINE | Facility: CLINIC | Age: 44
End: 2024-11-19
Payer: COMMERCIAL

## 2024-11-19 VITALS
SYSTOLIC BLOOD PRESSURE: 131 MMHG | BODY MASS INDEX: 31.77 KG/M2 | HEART RATE: 65 BPM | WEIGHT: 247.56 LBS | HEIGHT: 74 IN | DIASTOLIC BLOOD PRESSURE: 92 MMHG

## 2024-11-19 DIAGNOSIS — M54.12 CERVICAL RADICULOPATHY: ICD-10-CM

## 2024-11-19 DIAGNOSIS — M54.16 LUMBAR RADICULOPATHY: ICD-10-CM

## 2024-11-19 DIAGNOSIS — Z79.891 OPIOID CONTRACT EXISTS: Primary | ICD-10-CM

## 2024-11-19 DIAGNOSIS — Z79.891 OPIOID CONTRACT EXISTS: ICD-10-CM

## 2024-11-19 DIAGNOSIS — M51.362 DEGENERATION OF INTERVERTEBRAL DISC OF LUMBAR REGION WITH DISCOGENIC BACK PAIN AND LOWER EXTREMITY PAIN: ICD-10-CM

## 2024-11-19 DIAGNOSIS — M48.062 SPINAL STENOSIS OF LUMBAR REGION WITH NEUROGENIC CLAUDICATION: ICD-10-CM

## 2024-11-19 DIAGNOSIS — M47.812 CERVICAL SPONDYLOSIS: ICD-10-CM

## 2024-11-19 PROCEDURE — 3008F BODY MASS INDEX DOCD: CPT | Mod: CPTII,S$GLB,,

## 2024-11-19 PROCEDURE — 99999 PR PBB SHADOW E&M-EST. PATIENT-LVL III: CPT | Mod: PBBFAC,,,

## 2024-11-19 PROCEDURE — 3080F DIAST BP >= 90 MM HG: CPT | Mod: CPTII,S$GLB,,

## 2024-11-19 PROCEDURE — 4010F ACE/ARB THERAPY RXD/TAKEN: CPT | Mod: CPTII,S$GLB,,

## 2024-11-19 PROCEDURE — 3044F HG A1C LEVEL LT 7.0%: CPT | Mod: CPTII,S$GLB,,

## 2024-11-19 PROCEDURE — 3075F SYST BP GE 130 - 139MM HG: CPT | Mod: CPTII,S$GLB,,

## 2024-11-19 PROCEDURE — 99214 OFFICE O/P EST MOD 30 MIN: CPT | Mod: S$GLB,,,

## 2024-11-19 PROCEDURE — 1159F MED LIST DOCD IN RCRD: CPT | Mod: CPTII,S$GLB,,

## 2024-11-19 RX ORDER — HYDROCODONE BITARTRATE AND ACETAMINOPHEN 5; 325 MG/1; MG/1
1 TABLET ORAL EVERY 8 HOURS PRN
Qty: 90 TABLET | Refills: 0 | Status: SHIPPED | OUTPATIENT
Start: 2024-11-29 | End: 2024-12-29

## 2024-11-19 RX ORDER — HYDROCODONE BITARTRATE AND ACETAMINOPHEN 5; 325 MG/1; MG/1
1 TABLET ORAL EVERY 8 HOURS PRN
Qty: 90 TABLET | Refills: 0 | Status: SHIPPED | OUTPATIENT
Start: 2025-01-28 | End: 2025-02-27

## 2024-11-19 RX ORDER — HYDROCODONE BITARTRATE AND ACETAMINOPHEN 5; 325 MG/1; MG/1
1 TABLET ORAL EVERY 8 HOURS PRN
Qty: 90 TABLET | Refills: 0 | Status: SHIPPED | OUTPATIENT
Start: 2024-12-29 | End: 2025-01-28

## 2024-11-19 RX ORDER — METHYLPREDNISOLONE 4 MG/1
TABLET ORAL
Qty: 21 EACH | Refills: 0 | Status: SHIPPED | OUTPATIENT
Start: 2024-11-19 | End: 2024-12-10

## 2024-11-19 NOTE — PROGRESS NOTES
This note was completed with dictation software and grammatical errors may exist.    Chief Complaint   Patient presents with    Neck Pain        HPI: Philip Salinas is a 44 y.o. year old male patient who has a past medical history of Cervical disc disease, Disc degeneration, lumbar, Disc disease, degenerative, cervical, Hypertension, and Lumbar disc disease. He presents in referral from No ref. provider found for back and neck pain.  He returns for follow-up with some worsening neck pain with radiation into back of left shoulder blade and down midline back.  He also reports intermittent numbness and tingling in his arms with certain movements.  Some continued back pain but back pain at this time is manageable.  He has lost weight and is emotionally doing much better.  He continues to take hydrocodone 5/325 mg up to 3 times daily with added benefit and no ill side effects.      Initial history: The patient reports that he 1st injured his neck in 2006, this pain is located the base of his neck but feels that at this time it is tolerable and it is his back that is bothering him the most. He states that he has had back pain for over 10 years, states that it is hereditary.He has seen other specialists and has had several MRIs over the years.  He has done physical therapy numerous times.  While the pain has been severe, it has especially worsened in the last year and especially since this past October.  He states that the pain is across the bilateral low back and radiates into the posterior thighs, calves and into the feet.  It can be either leg.  States that he is fine when he is sitting but as soon as he starts to stand up and walk the pain becomes severe.  States that if he leans forward on a shopping cart he gets relief.  He denies any tyler weakness, no bowel or bladder incontinence.  He states that he can only stand for about 5 minutes before the pain becomes severe and he needs to sit down.    Pain intervention  history:  He had done what sounds like lumbar medial branch blocks in the past without relief.He is status post L5/S1 interlaminar epidural steroid injection on 12/21/2022 with 0% relief.  S/p bilateral L3/4 transforaminal injection on 02/03/2023 with only about a day of moderate relief at best. He is status post bilateral L5/S1 AMADEO on 05/26/2023 with 0% relief.    Spine surgeries:    Antineuropathics:  Has tried gabapentin in the past without any relief.  NSAIDs:  Mobic 15 mg, ibuprofen 1200 mg at a time   Physical therapy:  Has done multiple rounds of physical therapy in the past without long term relief.  Antidepressants:  Cymbalta 30  Muscle relaxers:  Opioids:  Antiplatelets/Anticoagulants:    ROS:  Reports back pain, difficulty sleeping and anxiety.  Balance of review of systems is negative.    Lab Results   Component Value Date    HGBA1C 5.5 10/22/2024       Lab Results   Component Value Date    WBC 6.08 10/22/2024    HGB 16.3 10/22/2024    HCT 50.4 10/22/2024    MCV 94 10/22/2024     10/22/2024             Past Medical History:   Diagnosis Date    Cervical disc disease     Disc degeneration, lumbar     Disc disease, degenerative, cervical     Hypertension     Lumbar disc disease        Past Surgical History:   Procedure Laterality Date    cervical rhizotomy      EPIDURAL STEROID INJECTION INTO LUMBAR SPINE N/A 12/21/2022    Procedure: Injection-steroid-epidural-lumbar L5/S1;  Surgeon: Jared Bartholomew MD;  Location: [facility] OR;  Service: Pain Management;  Laterality: N/A;    TRANSFORAMINAL EPIDURAL INJECTION OF STEROID Bilateral 2/3/2023    Procedure: Injection,steroid,epidural,transforaminal approach L3/4;  Surgeon: Jared Bartholomew MD;  Location: [facility] OR;  Service: Pain Management;  Laterality: Bilateral;    TRANSFORAMINAL EPIDURAL INJECTION OF STEROID Bilateral 5/26/2023    Procedure: Injection,steroid,epidural,transforaminal approach L5/S1 Bilat;  Surgeon: Jared Bartholomew MD;  Location:  "[facility] OR;  Service: Pain Management;  Laterality: Bilateral;  L5/S1 Bilat       Social History     Socioeconomic History    Marital status: Other    Number of children: 4   Occupational History    Occupation: supervisor      Comment: [employer]   Tobacco Use    Smoking status: Never     Passive exposure: Past    Smokeless tobacco: Never   Substance and Sexual Activity    Alcohol use: No    Drug use: No    Sexual activity: Yes     Partners: Female         Medications/Allergies: See med card    Vitals:    11/19/24 0749   BP: (!) 131/92   Pulse: 65   Weight: 112.3 kg (247 lb 9.2 oz)   Height: 6' 2" (1.88 m)   PainSc:   7   PainLoc: Neck       Body mass index is 31.79 kg/m².        11/19/2024     7:48 AM 8/19/2024     7:27 AM 5/17/2024     3:55 PM   Last 3 PDI Scores   Pain Disability Index (PDI) 46 54 44         Physical exam:  Gen: A and O x3, pleasant, well-groomed  Skin: No rashes or obvious lesions  HEENT: PERRLA, no obvious deformities on ears or in canals. Trachea midline.  CVS: Regular rate and rhythm, normal palpable pulses.  Resp:No increased work of breathing, symmetrical chest rise.  Abdomen: Soft, NT/ND.  Musculoskeletal: No antalgic gait.     Neuro:    Iliopsoas Quadriceps Knee  Flexion Tibialis  anterior Gastro- cnemius EHL   Lower: R 5/5 5/5 5/5 5/5 5/5 5/5    L 5/5 5/5 5/5 5/5 5/5 5/5      Left  Right    Patellar DTR 1+ 1+   Achilles DTR 0+ 0+                    Sensation: intact to lt touch bilaterally in c4-t1   Reflexes: 2+ b/l Bicep, tricep, BR   ROM: Cervical ROM full, shoulder, elbow and wrist ROM full  Tone:  Normal at elbow, wrist and shoulder   Inspection: no atrophy of bicep, FDI or APB noted  Palpation: tender cervical paraspinals, levator scapula and trapezius     Motor:      Right Left   C4 Shoulder Abduction  5  5   C5 Elbow Flexion    5  5   C6 Wrist Extension  5  5   C7 Elbow Extension   5  5   C8/T1 Hand Intrinsics   5  5                              Intact and symmetrical to light " touch and pinprick in L1-S1 dermatomes bilaterally.    Lumbar spine:  Lumbar spine: ROM is mildly reduced with flexion with slight increased pain, moderately reduced with extension with increased pain  Scottie's test causes no increased pain on either side.    Supine straight leg raise is negative bilaterally.    Internal and external rotation of the hip causes no increased pain on either side.  Myofascial exam: No tenderness to palpation across lumbar paraspinous muscles.      Imagin18 Xray L-spine:  Trace retrolisthesis of L4 on L5.  Alignment is otherwise within normal limits.  Vertebral body heights are maintained.  No acute fracture identified.  No high-grade osseous foraminal narrowing on oblique views.  Minor multilevel endplate changes.  Disc spaces are preserved.  No prevertebral soft tissue abnormality.    11 MRI C-spine:    1. MINIMAL C3-4 DISC BULGE WITH SEVERE LEFT AND MODERATE RIGHT FORAMINAL STENOSIS.     2. SEVERE BILATERAL FORAMINAL STENOSIS AT THE C4-5 LEVEL WHICH IS GREATER ON THE RIGHT THAN ON THE LEFT.     3. C5-6 MODERATE BILATERAL FORAMINAL STENOSIS.     4. MINIMAL C6-7 DISC BULGE WITH SEVERE LEFT AND MODERATE RIGHT FORAMINAL STENOSIS.     5. MILD BILATERAL FORAMINAL STENOSIS AT THE C7-T1 LEVEL.       X-ray lumbar spine 22:  There is grade 1 retrolisthesis of L2 on L3, L3 on L4 and L4 on L5.    MRI lumbar spine 2022:  Report only.  The lumbar spine canal demonstrates narrowed appearance likely on a developmental basis.  There is prominent epidural fat throughout the lumbar spine.  L1/2 no canal stenosis.  L2/3 there is mild disc desiccation and disc bulging, mild facet arthrosis.  There is mild bilateral neuroforaminal narrowing at this level.  At L3/4 there is a moderate broad-based disc bulge with mild-to-moderate bilateral facet arthrosis.  There is also slightly prominent epidural fat which contributes to thecal sac narrowing.  There is mild bilateral foraminal  narrowing and 6-7 millimeter canal.  At L4/5 there is broad-based disc bulge ligamentum hypertrophy, prominent epidural fat and moderate bilateral facet arthropathy.  The AP diameter the central thecal sac is 8 millimeters.  There is moderate lateral recess narrowing and left greater than right neural foraminal narrowing.  At L5/S1 there is disc space narrowing, disc bulging superimposed left paracentral annular fissure, ligamentous hypertrophy and severe left greater than right facet hypertrophy.    MRI lumbar spine 04/18/2023  FINDINGS:  Alignment: Normal.     Vertebral column: Vertebral body heights are maintained.  No evidence of an acute fracture or aggressive marrow replacement process. Spinal canal appears diffusely small in caliber, likely on a developmental basis. Epic lipomatosis, most pronounced at L2-L4.  Multilevel disc degeneration, most pronounced at L5-S1.     Cord: Normal.  Conus terminates at L1.     Degenerative findings:     T12-L1: Disc is normal configuration.  Mild bilateral facet arthropathy.  No neural foraminal or spinal canal stenosis.  L1-L2: Disc is normal in configuration.  Mild bilateral facet arthropathy and ligamentum flavum thickening.  There is no neural foraminal stenosis.  There is no spinal canal stenosis.  L2-L3: Mild diffuse disc bulge.  Mild bilateral facet arthropathy and ligamentum flavum thickening.  Epidural lipomatosis.  Mild bilateral neural foraminal stenosis.  Moderate spinal canal stenosis.  L3-L4: Diffuse disc bulge.  Mild-to-moderate bilateral facet arthropathy.  Epidural lipomatosis.  Mild bilateral neural foraminal stenosis.  Severe spinal canal stenosis.  L4-L5: Diffuse disc bulge with small central disc protrusion.  Moderate bilateral facet arthropathy.  Ligamentum flavum thickening.  Epidural lipomatosis.  Moderate left and mild right neural foraminal stenosis.  Moderate spinal canal stenosis.  L5-S1: Diffuse disc bulge.  Moderate left greater than right facet  arthropathy.  Ligamentum flavum thickening.  Severe left and moderate right neural foraminal stenosis.  There is no spinal canal stenosis.    Assessment:  Philip Salinas is a 43 y.o. year old male patient who has a past medical history of Cervical disc disease, Disc degeneration, lumbar, Disc disease, degenerative, cervical, Hypertension, and Lumbar disc disease. He presents in referral from Dr. Ana Valero for back and neck pain.     1. Opioid contract exists        2. Cervical spondylosis  methylPREDNISolone (MEDROL DOSEPACK) 4 mg tablet      3. Degeneration of intervertebral disc of lumbar region with discogenic back pain and lower extremity pain        4. Lumbar radiculopathy        5. Spinal stenosis of lumbar region with neurogenic claudication        6. Cervical radiculopathy                Plan:  He may be having some worsening cervical radicular pain.  Discussed treatment options being conservatively with oral Medrol Dosepak and formal PT versus updated imaging and consideration of a cervical AMADEO.  At this time he would like to see how he progresses with more conservative management via Medrol Dosepak.  Prescription provided today.   Dr. Bartholomew provided prescriptions for hydrocodone-acetaminophen 5/325 mg 3 times daily as needed for pain. I have reviewed the [place] Board of Pharmacy website and there are no abberancies.    Follow up in 3 months or sooner if needed.  Discussed if his neck pain persists can consider either diagnostic medial branch blocks or cervical AMADEO but would updated imaging prior.   If his lumbar symptoms worsen he will follow-up with Dr. Stanley for surgical intervention.

## 2024-12-20 ENCOUNTER — HOSPITAL ENCOUNTER (OUTPATIENT)
Dept: RADIOLOGY | Facility: HOSPITAL | Age: 44
Discharge: HOME | End: 2024-12-20
Attending: STUDENT IN AN ORGANIZED HEALTH CARE EDUCATION/TRAINING PROGRAM
Payer: COMMERCIAL

## 2024-12-20 DIAGNOSIS — C18.9 ADENOCARCINOMA OF COLON: ICD-10-CM

## 2024-12-20 LAB
GLUCOSE SERPL-MCNC: 95 MG/DL (ref 70–110)
GLUCOSE SERPL-MCNC: 95 MG/DL (ref 70–110)

## 2024-12-20 PROCEDURE — A9552 F18 FDG: HCPCS | Mod: PN | Performed by: STUDENT IN AN ORGANIZED HEALTH CARE EDUCATION/TRAINING PROGRAM

## 2024-12-20 PROCEDURE — 78815 PET IMAGE W/CT SKULL-THIGH: CPT | Mod: 26,PI,, | Performed by: RADIOLOGY

## 2024-12-20 PROCEDURE — 78815 PET IMAGE W/CT SKULL-THIGH: CPT | Mod: TC,PN

## 2024-12-20 RX ORDER — FLUDEOXYGLUCOSE F18 500 MCI/ML
12 INJECTION INTRAVENOUS
Status: COMPLETED | OUTPATIENT
Start: 2024-12-20 | End: 2024-12-20

## 2024-12-20 RX ADMIN — FLUDEOXYGLUCOSE F-18 12.4 MILLICURIE: 500 INJECTION INTRAVENOUS at 11:12

## 2024-12-20 NOTE — PROGRESS NOTES
PET Imaging Questionnaire    Are you a Diabetic? Recent Blood Sugar level? No    Are you anemic? Bone Marrow Stimulation Meds? No    Have you had a CT Scan, if so when & where was your last one? Yes -     Have you had a PET Scan, if so when & where was your last one? No    Chemotherapy or currently on Chemotherapy? No    Radiation therapy? No    Surgical History:   Past Surgical History:   Procedure Laterality Date    cervical rhizotomy      EPIDURAL STEROID INJECTION INTO LUMBAR SPINE N/A 12/21/2022    Procedure: Injection-steroid-epidural-lumbar L5/S1;  Surgeon: Vipin Cho MD;  Location: [facility] OR;  Service: Pain Management;  Laterality: N/A;    TRANSFORAMINAL EPIDURAL INJECTION OF STEROID Bilateral 2/3/2023    Procedure: Injection,steroid,epidural,transforaminal approach L3/4;  Surgeon: Vipin Cho MD;  Location: [facility] OR;  Service: Pain Management;  Laterality: Bilateral;    TRANSFORAMINAL EPIDURAL INJECTION OF STEROID Bilateral 5/26/2023    Procedure: Injection,steroid,epidural,transforaminal approach L5/S1 Bilat;  Surgeon: Vipin Cho MD;  Location: [facility] OR;  Service: Pain Management;  Laterality: Bilateral;  L5/S1 Bilat        Have you been fasting for at least 6 hours? Yes    Is there any chance you may be pregnant or breastfeeding? No    Assay: 14.2 MCi@:1140   Injection Site:rt ac     Residual: 1.85 mCi@: 1142   Technologist: Yoli Braun Injected:12.4mCi

## 2024-12-24 ENCOUNTER — TELEPHONE (OUTPATIENT)
Dept: HEMATOLOGY/ONCOLOGY | Facility: CLINIC | Age: 44
End: 2024-12-24
Payer: COMMERCIAL

## 2024-12-24 ENCOUNTER — TELEPHONE (OUTPATIENT)
Dept: HEMATOLOGY/ONCOLOGY | Facility: CLINIC | Age: 44
End: 2024-12-24

## 2024-12-24 NOTE — NURSING
Called patient to schedule from referral from Dr. Velasco for Adenocarcinoma of colon. Unable to leave VM, message on phone states unavailable. Will continue to follow for scheduling.

## 2024-12-24 NOTE — NURSING
Placed call to patient to scheduled, unable to LVM, box is full.  Will continue to follow for scheduling.

## 2024-12-27 DIAGNOSIS — Z79.891 OPIOID CONTRACT EXISTS: ICD-10-CM

## 2024-12-27 RX ORDER — HYDROCODONE BITARTRATE AND ACETAMINOPHEN 5; 325 MG/1; MG/1
1 TABLET ORAL EVERY 8 HOURS PRN
Qty: 90 TABLET | Refills: 0 | Status: SHIPPED | OUTPATIENT
Start: 2025-01-28 | End: 2025-02-27

## 2024-12-27 NOTE — TELEPHONE ENCOUNTER
----- Message from Niecy sent at 12/27/2024 11:31 AM CST -----  Contact: self  Type:  RX Refill Request    Who Called: pt     Refill or New Rx:refill    RX Name and Strength:HYDROcodone-acetaminophen (NORCO) 5-325 mg per tablet    How is the patient currently taking it? (ex. 1XDay):as directed     Is this a 30 day or 90 day RX:30    Preferred Pharmacy with phone number:  [facility] [address] [address]  Phone: [phone] Fax: [phone]        Local or Mail Order:local    Ordering Provider:shane    Would the patient rather a call back or a response via [facility]? Call back     Best Call Back Number:[phone]      Additional Information:    Please call back to advise. Thanks!

## 2025-01-02 PROBLEM — C18.7 ADENOCARCINOMA OF SIGMOID COLON: Status: ACTIVE | Noted: 2025-01-02

## 2025-01-02 PROBLEM — R94.2 ABNORMAL PET SCAN, LUNG: Status: ACTIVE | Noted: 2025-01-02

## 2025-01-03 PROBLEM — R59.0 MEDIASTINAL ADENOPATHY: Status: ACTIVE | Noted: 2025-01-03

## 2025-01-06 ENCOUNTER — TELEPHONE (OUTPATIENT)
Dept: PAIN MEDICINE | Facility: CLINIC | Age: 45
End: 2025-01-06
Payer: COMMERCIAL

## 2025-01-06 DIAGNOSIS — Z79.891 OPIOID CONTRACT EXISTS: ICD-10-CM

## 2025-01-06 RX ORDER — HYDROCODONE BITARTRATE AND ACETAMINOPHEN 5; 325 MG/1; MG/1
1 TABLET ORAL EVERY 8 HOURS PRN
Qty: 90 TABLET | Refills: 0 | Status: SHIPPED | OUTPATIENT
Start: 2025-01-06 | End: 2025-02-05

## 2025-01-06 NOTE — TELEPHONE ENCOUNTER
"Pharmacy called: "REFILL WAS SUPPOSED TO BE DATED 12/28/24 BUT WAS DATED FOR 01/28/2025   PT TOOK HIS LAST DOSE ON FRIDAY 01/03/25"    Spoke with patient and he said his last refill was on 11/28  "

## 2025-01-06 NOTE — TELEPHONE ENCOUNTER
----- Message from Caty sent at 1/6/2025  1:53 PM CST -----  Contact: PT  Type:  Needs Medical Advice    Who Called: PT   Symptoms (please be specific): UPDATE REQUESTED RE: REFILL DATE  FOR HYDROcodone-acetaminophen (NORCO) 5-325 mg  REFILL WAS SUPPOSED TO BE DATED 12/28/24 BUT WAS DATED FOR 01/28/2025  PT TOOK HIS LAST DOSE ON FRIDAY 01/03/25  How long has patient had these symptoms:  N/A  Pharmacy name and phone #:    [facility] [address] [address]  Phone: [phone] Fax: [phone]  Would the patient rather a call back or a response via [facility]? CALL   Best Call Back Number: [phone]  Additional Information: THANK YOU

## 2025-01-31 ENCOUNTER — TELEPHONE (OUTPATIENT)
Dept: HEMATOLOGY/ONCOLOGY | Facility: CLINIC | Age: 45
End: 2025-01-31
Payer: COMMERCIAL

## 2025-01-31 DIAGNOSIS — C18.7 ADENOCARCINOMA OF SIGMOID COLON: Primary | ICD-10-CM

## 2025-01-31 NOTE — NURSING
Spoke with patient, scheduled labs and CT in [place] patient verbalized agreement to time/date/location. Will continue to follow. Tempus solid tumor ordered and collected today. All questions and concerns addressed at this time.

## 2025-02-03 ENCOUNTER — HOSPITAL ENCOUNTER (OUTPATIENT)
Dept: RADIOLOGY | Facility: HOSPITAL | Age: 45
Discharge: HOME | End: 2025-02-03
Attending: INTERNAL MEDICINE
Payer: COMMERCIAL

## 2025-02-03 ENCOUNTER — LAB VISIT (OUTPATIENT)
Dept: LAB | Facility: HOSPITAL | Age: 45
End: 2025-02-03
Attending: INTERNAL MEDICINE
Payer: COMMERCIAL

## 2025-02-03 DIAGNOSIS — R59.0 LOCALIZED ENLARGED LYMPH NODES: ICD-10-CM

## 2025-02-03 DIAGNOSIS — C18.7 ADENOCARCINOMA OF SIGMOID COLON: ICD-10-CM

## 2025-02-03 LAB
BUN SERPL-MCNC: 17 MG/DL (ref 6–20)
CREAT SERPL-MCNC: 0.9 MG/DL (ref 0.5–1.4)
EST. GFR  (NO RACE VARIABLE): >60 ML/MIN/1.73 M^2

## 2025-02-03 PROCEDURE — 82565 ASSAY OF CREATININE: CPT | Performed by: INTERNAL MEDICINE

## 2025-02-03 PROCEDURE — 36415 COLL VENOUS BLD VENIPUNCTURE: CPT | Performed by: INTERNAL MEDICINE

## 2025-02-03 PROCEDURE — 25500020 PHARM REV CODE 255

## 2025-02-03 PROCEDURE — 71260 CT THORAX DX C+: CPT | Mod: 26,,, | Performed by: RADIOLOGY

## 2025-02-03 PROCEDURE — 84520 ASSAY OF UREA NITROGEN: CPT | Performed by: INTERNAL MEDICINE

## 2025-02-03 PROCEDURE — 74177 CT ABD & PELVIS W/CONTRAST: CPT | Mod: 26,,, | Performed by: RADIOLOGY

## 2025-02-03 PROCEDURE — 74177 CT ABD & PELVIS W/CONTRAST: CPT | Mod: TC

## 2025-02-03 RX ADMIN — IOHEXOL 30 ML: 300 INJECTION, SOLUTION INTRAVENOUS at 01:02

## 2025-02-03 RX ADMIN — IOHEXOL 100 ML: 350 INJECTION, SOLUTION INTRAVENOUS at 01:02

## 2025-02-06 ENCOUNTER — TELEPHONE (OUTPATIENT)
Dept: HEMATOLOGY/ONCOLOGY | Facility: CLINIC | Age: 45
End: 2025-02-06
Payer: COMMERCIAL

## 2025-02-06 DIAGNOSIS — C18.7 ADENOCARCINOMA OF SIGMOID COLON: Primary | ICD-10-CM

## 2025-02-06 NOTE — NURSING
Reviewed chart. Patients PGX and tempus were not drawn.  Upon investigation the labs were canceled by [facility].  Re-entered the required labs and scheduled for tomorrow morning first thing. The labs are need for the patients visit with Dr. Lerner on 2/11. Will continue to monitor.     Spoke with patient who agreed to come in tomorrow to have the labs drawn at [facility] 2/7 for his 2/11 appointment. Pt verbalized agreement to time/date/location. Will continue to follow. All questions and concerns addressed.

## 2025-02-07 ENCOUNTER — LAB VISIT (OUTPATIENT)
Dept: LAB | Facility: HOSPITAL | Age: 45
End: 2025-02-07
Attending: INTERNAL MEDICINE
Payer: COMMERCIAL

## 2025-02-07 ENCOUNTER — DOCUMENTATION ONLY (OUTPATIENT)
Dept: HEMATOLOGY/ONCOLOGY | Facility: CLINIC | Age: 45
End: 2025-02-07
Payer: COMMERCIAL

## 2025-02-07 DIAGNOSIS — C18.7 ADENOCARCINOMA OF SIGMOID COLON: ICD-10-CM

## 2025-02-07 PROCEDURE — 36415 COLL VENOUS BLD VENIPUNCTURE: CPT | Mod: PN | Performed by: INTERNAL MEDICINE

## 2025-02-07 NOTE — NURSING
Chart review. Genetics labs drawn today per my discussion with patient yesterday. Appointment with Dr. Lerner 2/11 will continue to follow.

## 2025-02-10 NOTE — PROGRESS NOTES
"Subjective     Patient ID: Mason Rodriguez is a 44 y.o. male.    Chief Complaint: New Patient ([facility] ([facility]) - colon cancer/[place]/)  Referring Physician: Dr. Mirza Golden MD General Surgery     [facility] 44[facility] who underwent a screening colonoscopy on 11/26/2024 and was noted to have a 17 mm pedunculated polyp in the sigmoid colon, pathology revealed adenocarcinoma arising in a tubular adenoma.    On 01/28/2025 he underwent robotic sigmoid colectomy  Pathology revealed metastatic adenocarcinoma in the sigmoid colon in 1 of the 13  lymph nodes ( see addendum below) .   PROCEDURE: Sigmoidectomy   TUMOR SITE: Sigmoid colon   HISTOLOGIC TYPE: Adenocarcinoma arising in a tubular adenoma   HISTOLOGIC GRADE: Moderately differentiated   TUMOR SIZE: Polyp largely replaced by invasive moderately differentiated    adenocarcinoma   MULTIPLE PRIMARY SITES: Negative   TUMOR EXTENT: Extending through muscularis mucosa into submucosa   SUB-MUCOSAL INVASION: Minimal (polypectomy margin only)   LYMPHATIC AND/OR VASCULAR INVASION: Negative   PERINEURAL INVASION: Negative   TUMOR BUDDING SCORE: Negative   MARGIN STATUS FOR INVASIVE CARCINOMA: Negative   MARGIN STATUS FOR NON-INVASIVE TUMOR: Negative   DNA MISMATCH REPAIR STATUS (MMR): Pending on bishop metastasis.   REGIONAL LYMPH NODE STATUS:   NUMBER OF LYMPH NODES WITH TUMOR: At least one (1)   NUMBER OF LYMPH NODES EXAMINED: Two (2) - additional pending   TUMOR DEPOSITS: Pending   PATHOLOGIC STAGE CLASSIFICATION: pT1 pN pending, at least N1a   The addtional sections described below yield eleven additional lymph    nodes, all negative for tumor. The final node assessment is metastatic    carcinoma in one of thirteen lymph nodes (1/13) with the final bishop    stage pN1a.   Immunohistochemistry (IHC) Results for Mismatch Repair (MMR) Proteins WAYNE    Off note he had a PET scan on 12/20/2024 which revealed "Hypermetabolic nodule or node posterior to the right " "hilum with adjacent consolidation.  The overall appearance on CT is more suggestive of an infectious/inflammatory process than metastatic malignancy, but close follow-up will be necessary to rule out a malignant etiology.  Alternatively, further evaluation with EBUS could be pursued. Activity near the rectosigmoid junction may correspond to the site of malignant polyp resection with differential possibilities as outlined above.  No metastatic disease to lymph nodes or liver"    He underwent EBUS on 1/3/2025 and pathology revealed RIGHT LOWER LOBE LUNG NODULE FINE NEEDLE ASPIRATE. NEGATIVE FOR MALIGNANT CELLS. NON-CASEATING GRANULOMA/GIANT CELLS PRESENT"         He also has a family history of polyps but no family history of colon cancer or other GI cancer    Review of Systems   Constitutional:  Positive for unexpected weight change. Negative for appetite change.   HENT:  Negative for mouth sores.    Eyes:  Positive for visual disturbance.   Respiratory:  Negative for cough and shortness of breath.    Cardiovascular:  Negative for chest pain.   Gastrointestinal:  Positive for diarrhea. Negative for abdominal pain.   Genitourinary:  Positive for frequency.   Musculoskeletal:  Positive for back pain.   Integumentary:  Negative for rash.   Neurological:  Negative for headaches.   Hematological:  Negative for adenopathy.   Psychiatric/Behavioral:  The patient is nervous/anxious.             Allergies:  Review of patient's allergies indicates:  No Known Allergies    Medications:  Current Outpatient Medications   Medication Sig Dispense Refill    EScitalopram oxalate (LEXAPRO) 10 MG tablet Take 10 mg by mouth once daily.      HYDROcodone-acetaminophen (NORCO) 5-325 mg per tablet Take 1 tablet by mouth every 8 (eight) hours as needed for Pain. 90 tablet 0    ibuprofen (ADVIL,MOTRIN) 200 MG tablet Take 800 mg by mouth every 8 (eight) hours as needed for Pain.      oxyCODONE-acetaminophen (PERCOCET)  mg per tablet Take " 1 tablet by mouth every 6 (six) hours as needed for Pain. 28 tablet 0    loperamide (IMODIUM) 2 mg capsule Diarrhea: take 2 tablets after first loose stool, followed by 1 tablet after each loose stool, up to 8 tablets/day 45 capsule 1    ondansetron (ZOFRAN) 8 MG tablet Take 1 tablet (8 mg total) by mouth every 6 (six) hours as needed. 60 tablet 2    prochlorperazine (COMPAZINE) 5 MG tablet Take 1 tablet (5 mg total) by mouth 3 (three) times daily as needed for Nausea. 60 tablet 1     No current facility-administered medications for this visit.       PMH:  Past Medical History:   Diagnosis Date    Cancer     sigmoid cancer    Cervical disc disease     Disc degeneration, lumbar     Disc disease, degenerative, cervical     Hypertension     Lumbar disc disease        PSH:  Past Surgical History:   Procedure Laterality Date    cervical rhizotomy      CYSTOSCOPY W/ URETERAL STENT PLACEMENT Left 1/28/2025    Procedure: CYSTOSCOPY, WITH URETERAL STENT INSERTION;  Surgeon: Vj aMdden MD;  Location: [facility] OR;  Service: Urology;  Laterality: Left;    DV5 ROBOTIC COLECTOMY, SIGMOID N/A 1/28/2025    Procedure: DV5 ROBOTIC COLECTOMY, SIGMOID;  Surgeon: Mirza Golden MD;  Location: [facility] OR;  Service: General;  Laterality: N/A;  stents w/ sleeper or purhoit    ENDOBRONCHIAL ULTRASOUND Bilateral 1/3/2025    Procedure: ENDOBRONCHIAL ULTRASOUND (EBUS);  Surgeon: Jay Byrnes MD;  Location: [facility] OR;  Service: Pulmonary;  Laterality: Bilateral;    EPIDURAL STEROID INJECTION INTO LUMBAR SPINE N/A 12/21/2022    Procedure: Injection-steroid-epidural-lumbar L5/S1;  Surgeon: Vipin Cho MD;  Location: [facility] OR;  Service: Pain Management;  Laterality: N/A;    TRANSFORAMINAL EPIDURAL INJECTION OF STEROID Bilateral 2/3/2023    Procedure: Injection,steroid,epidural,transforaminal approach L3/4;  Surgeon: Vipin Cho MD;  Location: [facility] OR;  Service: Pain Management;  Laterality: Bilateral;    TRANSFORAMINAL  EPIDURAL INJECTION OF STEROID Bilateral 5/26/2023    Procedure: Injection,steroid,epidural,transforaminal approach L5/S1 Bilat;  Surgeon: Vipin Cho MD;  Location: [facility];  Service: Pain Management;  Laterality: Bilateral;  L5/S1 Bilat       FamHx:  Family History   Problem Relation Name Age of Onset    Depression Mother      Hypertension Father      Arthritis Father      Heart disease Maternal Grandmother      Heart disease Maternal Grandfather      Cancer Paternal Grandmother         SocHx:  Social History     Socioeconomic History    Marital status: Legally     Number of children: 4   Occupational History    Occupation: supervisor      Comment: [employer]   Tobacco Use    Smoking status: Never     Passive exposure: Past    Smokeless tobacco: Never   Substance and Sexual Activity    Alcohol use: Not Currently    Drug use: No    Sexual activity: Yes     Partners: Female   Social History Narrative    ** Merged History Encounter **          Social Drivers of Health     Financial Resource Strain: Low Risk  (2/6/2025)    Overall Financial Resource Strain (CARDIA)     Difficulty of Paying Living Expenses: Not hard at all   Recent Concern: Financial Resource Strain - Medium Risk (1/13/2025)    Received from [facility]    Overall Financial Resource Strain (CARDIA)     Difficulty of Paying Living Expenses: Somewhat hard   Food Insecurity: Food Insecurity Present (2/6/2025)    Hunger Vital Sign     Worried About Running Out of Food in the Last Year: Sometimes true     Ran Out of Food in the Last Year: Sometimes true   Transportation Needs: No Transportation Needs (1/29/2025)    TRANSPORTATION NEEDS     Transportation : No   Physical Activity: Sufficiently Active (2/6/2025)    Exercise Vital Sign     Days of Exercise per Week: 7 days     Minutes of Exercise per Session: 90 min   Stress: Stress Concern Present (2/6/2025)    [employer] -  Occupational Stress Questionnaire     Feeling of Stress : To some extent   Housing Stability: Unknown (2/6/2025)    Housing Stability Vital Sign     Unable to Pay for Housing in the Last Year: No     Homeless in the Last Year: No        Objective     Physical Exam  Vitals reviewed.   Constitutional:       Appearance: He is well-developed.   HENT:      Mouth/Throat:      Pharynx: No oropharyngeal exudate.   Cardiovascular:      Rate and Rhythm: Normal rate.      Heart sounds: Normal heart sounds.   Pulmonary:      Effort: Pulmonary effort is normal.      Breath sounds: Normal breath sounds. No wheezing.   Abdominal:      General: Bowel sounds are normal.      Palpations: Abdomen is soft.      Tenderness: There is no abdominal tenderness.      Comments: Well healed robotic resection scars seen    Musculoskeletal:         General: No tenderness.   Lymphadenopathy:      Cervical: No cervical adenopathy.   Skin:     General: Skin is warm and dry.      Findings: No rash.   Neurological:      Mental Status: He is alert and oriented to person, place, and time.      Coordination: Coordination normal.   Psychiatric:         Thought Content: Thought content normal.         Judgment: Judgment normal.            Assessment and Plan     1. Adenocarcinoma of sigmoid colon  -     CBC w/ DIFF; Standing  -     CMP; Standing  -     ondansetron (ZOFRAN) 8 MG tablet; Take 1 tablet (8 mg total) by mouth every 6 (six) hours as needed.  Dispense: 60 tablet; Refill: 2  -     prochlorperazine (COMPAZINE) 5 MG tablet; Take 1 tablet (5 mg total) by mouth 3 (three) times daily as needed for Nausea.  Dispense: 60 tablet; Refill: 1  -     loperamide (IMODIUM) 2 mg capsule; Diarrhea: take 2 tablets after first loose stool, followed by 1 tablet after each loose stool, up to 8 tablets/day  Dispense: 45 capsule; Refill: 1  -     Ambulatory referral/consult to Chemo School; Future; Expected date: 02/18/2025  -     Ambulatory referral/consult to  Genetics; Future; Expected date: 02/18/2025  -     Care Companion Enrollment Chemotherapy; [facility]    2. Secondary adenocarcinoma of lymph node    3. Anxiety  -     EScitalopram oxalate (LEXAPRO) 10 MG tablet; Take 1 tablet daily for 7 days and then take 2 tablets daily  Dispense: 90 tablet; Refill: 3      Route Chart for Scheduling    Med Onc Chart Routing      Follow up with physician . Get auth for chemo. Schedule chemo school. He is seeing gerneral surgery tomorrow so will get PORT. During the week of 3/10 rené CBC, CMP and see me and for Xeloda (oral) and Oxaliplatin (IV). Decouple labs.  Also needs to see Genetics   Follow up with YOSEPH    Infusion scheduling note    Injection scheduling note    Labs    Imaging    Pharmacy appointment    Other referrals                  Treatment Plan Information   OP COLORECTAL CAPECITABINE OXALIPLATIN Edel Lerner MD   Associated diagnosis: Secondary adenocarcinoma of lymph node   noted on 2/11/2025  Associated diagnosis: Adenocarcinoma of sigmoid colon Stage IIIA pT2, pN1, cM0 noted on 1/2/2025   Line of treatment: Adjuvant  Treatment Goal: Curative     Upcoming Treatment Dates - OP COLORECTAL CAPECITABINE OXALIPLATIN    3/11/2025       Pre-Medications       palonosetron 0.25mg/dexAMETHasone 12mg in NS IVPB 0.25 mg 50 mL       Chemotherapy       oxaliplatin (ELOXATIN) 130 mg/m2 = 308 mg in D5W 561.6 mL chemo infusion  4/1/2025       Pre-Medications       palonosetron 0.25mg/dexAMETHasone 12mg in NS IVPB 0.25 mg 50 mL       Chemotherapy       oxaliplatin (ELOXATIN) 130 mg/m2 = 308 mg in D5W 561.6 mL chemo infusion  4/22/2025       Pre-Medications       palonosetron 0.25mg/dexAMETHasone 12mg in NS IVPB 0.25 mg 50 mL       Chemotherapy       oxaliplatin (ELOXATIN) 130 mg/m2 = 308 mg in D5W 561.6 mL chemo infusion  5/13/2025       Pre-Medications       palonosetron 0.25mg/dexAMETHasone 12mg in NS IVPB 0.25 mg 50 mL       Chemotherapy       oxaliplatin (ELOXATIN) 130  mg/m2 = 308 mg in D5W 561.6 mL chemo infusion    Mr. Rodriguez comes in for his new diagnosis of sigmoid colon adenocarcinoma, pathologic stage is [id number]   Discussed adjuvant chemotherapy with 4 cycles of XELOX.  We will start in about 4 weeks.  He needs port placement chemo school.  He is seeing  tomorrow, message sent to request for placement as  Chemo care companion orders were placed.  E scribed Zofran Compazine and Imodium in anticipation of starting treatment  Anxiety:  E scribed Lexapro  Also referral to Genetics due to early-onset colon cancer  Visit today included increased complexity associated with the care of the episodic problem adjuvant chemotherapy addressed and managing the longitudinal care of the patient due to the serious and/or complex managed problem(s) Colon cancer    Above care plan was discussed with patient and accompanying girlfriend (Stacy Szymanski) and all questions were addressed to their satisfaction       Addendum added on 02/12/2025/ reviewed with research team to see if he is a candidate for NRG- clinical trial which is for patients who have stage IIB to stage III colon cancer post resection, CT DNA evaluation and divides patient's into COHORT A which is CT DNA negative gets randomized to FOLFOX versus serial CT DNA testing.  Versus a cohort B which is CT DNA positive which then gets randomized to FOLFOX or FOLFIRINOX    Addendum added on 2/14/2025, was able to talk to patient about NRG- clinical trial and he is interested and he is actually coming in to meet research next week     Left him a voicemail as his phone went directly into voicemail

## 2025-02-11 ENCOUNTER — OFFICE VISIT (OUTPATIENT)
Dept: HEMATOLOGY/ONCOLOGY | Facility: CLINIC | Age: 45
End: 2025-02-11
Payer: COMMERCIAL

## 2025-02-11 VITALS
HEIGHT: 74 IN | SYSTOLIC BLOOD PRESSURE: 122 MMHG | OXYGEN SATURATION: 98 % | RESPIRATION RATE: 20 BRPM | WEIGHT: 236.56 LBS | DIASTOLIC BLOOD PRESSURE: 82 MMHG | TEMPERATURE: 99 F | HEART RATE: 71 BPM | BODY MASS INDEX: 30.36 KG/M2

## 2025-02-11 DIAGNOSIS — C77.9 SECONDARY ADENOCARCINOMA OF LYMPH NODE: ICD-10-CM

## 2025-02-11 DIAGNOSIS — F41.9 ANXIETY: ICD-10-CM

## 2025-02-11 DIAGNOSIS — C18.7 ADENOCARCINOMA OF SIGMOID COLON: Primary | ICD-10-CM

## 2025-02-11 PROCEDURE — 3079F DIAST BP 80-89 MM HG: CPT | Mod: CPTII,S$GLB,, | Performed by: INTERNAL MEDICINE

## 2025-02-11 PROCEDURE — 3008F BODY MASS INDEX DOCD: CPT | Mod: CPTII,S$GLB,, | Performed by: INTERNAL MEDICINE

## 2025-02-11 PROCEDURE — 1111F DSCHRG MED/CURRENT MED MERGE: CPT | Mod: CPTII,S$GLB,, | Performed by: INTERNAL MEDICINE

## 2025-02-11 PROCEDURE — 99999 PR PBB SHADOW E&M-EST. PATIENT-LVL V: CPT | Mod: PBBFAC,,, | Performed by: INTERNAL MEDICINE

## 2025-02-11 PROCEDURE — G2211 COMPLEX E/M VISIT ADD ON: HCPCS | Mod: S$GLB,,, | Performed by: INTERNAL MEDICINE

## 2025-02-11 PROCEDURE — 3074F SYST BP LT 130 MM HG: CPT | Mod: CPTII,S$GLB,, | Performed by: INTERNAL MEDICINE

## 2025-02-11 PROCEDURE — 99205 OFFICE O/P NEW HI 60 MIN: CPT | Mod: S$GLB,,, | Performed by: INTERNAL MEDICINE

## 2025-02-11 RX ORDER — ESCITALOPRAM OXALATE 10 MG/1
TABLET ORAL
Qty: 90 TABLET | Refills: 3 | OUTPATIENT
Start: 2025-02-11

## 2025-02-11 RX ORDER — PROCHLORPERAZINE MALEATE 5 MG
5 TABLET ORAL 3 TIMES DAILY PRN
Qty: 60 TABLET | Refills: 1 | Status: SHIPPED | OUTPATIENT
Start: 2025-02-11 | End: 2026-02-11

## 2025-02-11 RX ORDER — ESCITALOPRAM OXALATE 20 MG/1
20 TABLET ORAL DAILY
Qty: 30 TABLET | Refills: 11 | Status: SHIPPED | OUTPATIENT
Start: 2025-02-11 | End: 2026-02-11

## 2025-02-11 RX ORDER — ESCITALOPRAM OXALATE 10 MG/1
TABLET ORAL
Qty: 90 TABLET | Refills: 3 | Status: SHIPPED | OUTPATIENT
Start: 2025-02-11 | End: 2025-02-12

## 2025-02-11 RX ORDER — LOPERAMIDE HYDROCHLORIDE 2 MG/1
CAPSULE ORAL
Qty: 45 CAPSULE | Refills: 1 | Status: SHIPPED | OUTPATIENT
Start: 2025-02-11

## 2025-02-11 RX ORDER — ONDANSETRON HYDROCHLORIDE 8 MG/1
8 TABLET, FILM COATED ORAL EVERY 6 HOURS PRN
Qty: 60 TABLET | Refills: 2 | Status: SHIPPED | OUTPATIENT
Start: 2025-02-11 | End: 2026-02-11

## 2025-02-11 NOTE — PLAN OF CARE
START ON PATHWAY REGIMEN - Colorectal    COS82        Capecitabine (Xeloda)       Oxaliplatin     **Always confirm dose/schedule in your pharmacy ordering system**    Patient Characteristics:  Postoperative without Neoadjuvant Therapy, M0 (Pathologic Staging), Colon, Stage   III, Low Risk (pT1-3, pN1)  Tumor Location: Colon  Therapeutic Status: Postoperative without Neoadjuvant Therapy, M0 (Pathologic   Staging)  AJCC M Category: cM0  AJCC T Category: pT2  AJCC N Category: pN1  AJCC 8 Stage Grouping: IIIA  Intent of Therapy:  Curative Intent, Discussed with Patient

## 2025-02-11 NOTE — Clinical Note
I was able to get him on the phone today and talk to him about the trial, he is interested and he told me he is coming in to see you guys next week, which I think he is confused because he is scheduled for a chemo school on Tuesday.  I think we can cancel the chemo school for now and talk to him about the trial  Can you please get him in to talk about the trial NONI Dee:  Slight plan change he may go on the trial so I have not sent in the Xeloda waiting to see which arm he gets randomized to. Attaching Jean-Pierre since he is seeing him for chemo school

## 2025-02-12 ENCOUNTER — TELEPHONE (OUTPATIENT)
Dept: HEMATOLOGY/ONCOLOGY | Facility: CLINIC | Age: 45
End: 2025-02-12
Payer: COMMERCIAL

## 2025-02-12 DIAGNOSIS — C18.7 ADENOCARCINOMA OF SIGMOID COLON: Primary | ICD-10-CM

## 2025-02-12 NOTE — TELEPHONE ENCOUNTER
"Chart reviewed. Referrals for RD/SW placed. Called Mr. Rodriguez - introduced myself and explained my role as oncology navigator.     Discussed purpose and expected duration of chemo school along with the providers he will meet at this visit. Scheduled chemo school on Tuesday 2/18 with GEORGES Prather NP. Reviewed apt date/time/location.     Encouraged Mr. Rodriguez to call back with any questions or concerns. He v/u and thanked me for my call.     Oncology Navigation   Intake  Date of Diagnosis: 01/31/25  Cancer Type: GI     Treatment  Current Status: Active    Medical Oncologist: Dr. Edel Lerner  Consult Date: 02/11/25  Chemotherapy: Planned  Chemotherapy Regimen: CAPECITABINE OXALIPLATIN    Procedures: Biopsy; CT; PET scan  Biopsy Schedule Date: 01/28/25  CT Schedule Date: 02/03/25  PET Scan Schedule Date: 12/20/24    General Referrals: Chemo Education; Social Work; Dietitian  Dietitian Referral Date: 02/12/25  Social Work Referral Date: 02/12/25    Barriers of Care: Barriers to Care "Assessment completed-no barriers noted"     Acuity  Stage: 2  Systemic Treatment - predicted or initiated: Chemotherapy Regimen with Multiple drugs (+1)  Treatment Tolerability: Has not started treatment yet/treatment fully completed and side effects resolved  Comorbidities in Medical History: 2  Hospitalization Within the Past Month: 1   Needed: 0  Verbalizes Financial Concerns: 0  Transportation: 0  History of noncompliance/frequent no shows and cancellations: 0  Verbalizes the need for more education: 1  Navigation Acuity: 8     Follow Up  Follow up for oncology navigation needs.       ----- Message from Med Assistant Xiao sent at 2/11/2025  2:07 PM CST -----  Follow up with physician . Get auth for chemo. Schedule chemo school. He is seeing gerneral surgery tomorrow so will get PORT. During the week of 3/10 scheeule CBC, CMP and see me and for Xeloda (oral) and Oxaliplatin (IV). Decouple labs  "

## 2025-02-13 ENCOUNTER — RESEARCH ENCOUNTER (OUTPATIENT)
Dept: RESEARCH | Facility: HOSPITAL | Age: 45
End: 2025-02-13
Payer: COMMERCIAL

## 2025-02-13 NOTE — PROGRESS NOTES
Protocol NRG-, Colon Adjuvant Chemotherapy Based on Evaluation of Residual Disease (CIRCULATE-US) (NCT [id number]) Sponsor's Protocol #: NRG- IRB# 2022.200    Per Dr. Lerner's request, this CRC phoned and spoke to the patient regarding learning more about and potentially enrolling in the referenced study.    The patient stated that he would be interested in learning about the trial and reviewing the trial's Informed Consent. Per the patient's request, this CRC emailed a copy of the referenced study's IRB-approved informed consent to the patient's confirmed email address: [email or web address].    The patient agreed to meet on Tuesday, February 18th, 2025, before his planned chemotherapy education class.  The treating MD will be at the [facility] on 2/18/2025 to assist in any questions that the patient may have.    This CRC encouraged Mr. Rodriguez to contact me directly at  with any clinical trial-related questions. I expressed my appreciation for the patient's time and interest in the study. At the end of the phone call, the patient verbalized understanding and denied any questions.    TRUNG Reddy RN

## 2025-02-16 ENCOUNTER — TELEPHONE (OUTPATIENT)
Dept: PHARMACY | Facility: CLINIC | Age: 45
End: 2025-02-16
Payer: COMMERCIAL

## 2025-02-16 NOTE — TELEPHONE ENCOUNTER
[facility] Chart Review & Patient Outreach Details For Medication Adherence Project    Reason for Outreach Encounter: 3rd Party payor non-compliance report ([employer], [employer]BS, [employer], etc)  2.  Patient Outreach Method: Reviewed patient chart   3.   Medication in question:             Enalapril d/c    4.  Reviewed and or Updates Made To: Patient Chart  5. Outreach Outcomes and/or actions taken: Medication discontinued  Additional Notes:

## 2025-02-17 ENCOUNTER — RESEARCH ENCOUNTER (OUTPATIENT)
Dept: RESEARCH | Facility: HOSPITAL | Age: 45
End: 2025-02-17
Payer: COMMERCIAL

## 2025-02-17 DIAGNOSIS — C18.7 ADENOCARCINOMA OF SIGMOID COLON: Primary | ICD-10-CM

## 2025-02-17 RX ORDER — CAPECITABINE 500 MG/1
2000 TABLET, FILM COATED ORAL 2 TIMES DAILY
Qty: 112 TABLET | Refills: 0 | Status: ACTIVE | OUTPATIENT
Start: 2025-02-17 | End: 2026-02-17

## 2025-02-17 NOTE — PROGRESS NOTES
"Cancer Genetics  Hereditary and High-Risk Clinic  Department of Hematology and Oncology  [facility]    Date of Service:  25  Visit Provider:  Saritha Brown MS, [facility] [place]    Patient ID  Name: Mason Rodriguez    : 1980    MRN: [id number]      Referring Provider  Edel Lerner MD  [address] [place] [address]      Face-to-face time with patient:  Approximately 21 minutes.    Approximately 49 minutes in total were spent on the day of this encounter, which includes face-to-face time and non-face-to-face time preparing to see the patient (e.g., review of records and tests), obtaining and/or reviewing separately obtained history, documenting clinical information in the electronic or other health record, independently interpreting results (not separately reported) and communicating results to the patient/family/caregiver, or care coordination (not separately reported).      IMPRESSION      Mason Rodriguez is a pleasant 44 y.o. male patient seen in genetic counseling given his recent diagnosis of  adenocarcinoma of sigmoid colon. Mason Rodriguez was accompanied today his wife Tomasa. Mr. Rodriguez meets NCCN Nicole genetic testing criteria due to his age at diagnosis (45yo) of adenocarcinoma of sigmoid colon. His family history is not suspicious of a hereditary cancer syndrome given the types of cancers seen and large family with many unaffected individuals. Additionally, there was no loss of nuclear expression of MMR proteins per pathology. Regardless, we discussed his option to proceed with genetic testing through either focused or a broader approach. He elected to proceed through the [employer] xG panel with RNA. A blood sample was collected today 2025.     FOCUSED PERSONAL HISTORY     Chief Complaint: Genetic Evaluation (Colon cancer)    History of Present Illness (HPI):  Mason Rodriguez ("Mason"), 44 y.o., assigned male sex at birth, is established with the [facility] " Department of Hematology and Oncology but new to me.  He was referred by Medical Oncology for hereditary cancer risk assessment given his young age of diagnosis of adenocarcinoma of sigmoid colon.    Cancer History    Mason Rodriguez underwent a screening colonoscopy on 11/26/2024 and was noted to have a 17 mm pedunculated polyp in the sigmoid colon, pathology revealed adenocarcinoma arising in a tubular adenoma. On 01/28/2025 he underwent robotic sigmoid colectomy. Pathology revealed metastatic adenocarcinoma in the sigmoid colon in 1 of the 13  lymph nodes. No loss of nuclear expression of MMR proteins: No evidence of deficient mismatch repair (low probability of MSI-H).  Oncology History   Adenocarcinoma of sigmoid colon   1/2/2025 Initial Diagnosis    Adenocarcinoma of sigmoid colon     1/28/2025 Cancer Staged    Staging form: Colon and Rectum, AJCC 8th Edition  - Pathologic stage from 1/28/2025: Stage IIIA (pT2, pN1, cM0)     3/11/2025 -  Chemotherapy    Treatment Summary   Plan Name: OP COLORECTAL CAPECITABINE OXALIPLATIN  Treatment Goal: Curative  Status: Active  Start Date: 3/11/2025 (Planned)  End Date: 5/13/2025 (Planned)  Provider: Edel Lerner MD  Chemotherapy: capecitabine (XELODA) 500 MG Tab, 850 mg/m2 = 2,000 mg, Oral, 2 times daily, 0 of 1 cycle, Start date: --, End date: --  oxaliplatin (ELOXATIN) 130 mg/m2 = 308 mg in D5W 561.6 mL chemo infusion, 130 mg/m2 = 308 mg, Intravenous, Clinic/HOD 1 time, 0 of 4 cycles     Secondary adenocarcinoma of lymph node   2/11/2025 Initial Diagnosis    Secondary adenocarcinoma of lymph node     3/11/2025 -  Chemotherapy    Treatment Summary   Plan Name: OP COLORECTAL CAPECITABINE OXALIPLATIN  Treatment Goal: Curative  Status: Active  Start Date: 3/11/2025 (Planned)  End Date: 5/13/2025 (Planned)  Provider: Edel Lerner MD  Chemotherapy: capecitabine (XELODA) 500 MG Tab, 850 mg/m2 = 2,000 mg, Oral, 2 times daily, 0 of 1 cycle, Start date: --, End date:  --  oxaliplatin (ELOXATIN) 130 mg/m2 = 308 mg in D5W 561.6 mL chemo infusion, 130 mg/m2 = 308 mg, Intravenous, Clinic/HOD 1 time, 0 of 4 cycles          Masses/tumors/lesions  3 cm sebaceous cyst over right lateral hip     Focused Medical History  Previous germline cancer genetic testing:  No  Colonoscopy: Yes  Most recent colonoscopy: 11/26/2024  Pancreatitis:  No    Tobacco Use  Some cigar and tobacco pipe usage on occasion     FAMILY HISTORY     Cancer Pedigree     Brother had recent colonoscopy, had a few benign polyps    Maternal:  No known history of cancer    Paternal:  Father had 13 polyps (benign) on most recent colonoscopy  Uncle with lung cancer at 65  Grandmother with melanoma later in life    A family history of birth defects, intellectual disability, SIDS, sudden early death, multiple miscarriages and consanguinity were denied. Please refer to above pedigree for further details. A larger copy has been scanned in the Media tab.     DISCUSSION     LUTHER-INDICATION    Approximately 10% of colorectal cancers are hereditary. Things that make us suspicious of a hereditary cause of cancer include the type of cancers seen in the family, number of people with cancer, ages of people with cancer, and certain cancer pathologies.The majority of hereditary colorectal cancer is caused by mutations in MLH1, MSH2, MSH6, PMS2 or EPCAM, which cause Luther syndrome/Hereditary Nonpolyposis Colorectal Cancer (HNPCC). Additionally, there are a number of other highly and moderately penetrant genes that are associated with increased risks of colorectal cancer, such as AXIN2, CHEK2, GALNT12, PTEN, STK11, and TP53. Mason meets NCCN criteria for genetic testing based on his diagnosis of colon cancer at age 44. Therefore, he was offered phenotype-driven and broad panel testing. Mason opted for the [employer] panel with RNA through 39 of the following  genes associated with hereditary cancers:    APC, WILL, AXIN2, BAP1, BARD1,  BMPR1A, BRCA1, BRCA2, BRIP1, CDH1, CDK4, CDKN2A, CHEK2, EPCAM, FH, FLCN, GREM1, HOXB13, MBD4, MET, MLH1, MSH2, MSH3, MSH6, MUTYH, NF1, NTHL1, PALB2, PMS2, POLD1, POLE, PTEN, RAD51C, RAD51D, SMAD4, STK11, TP53, TSC1, TSC2, VHL     We reviewed that mutations in the highly penetrant genes put an individual at a significantly increased risk of colorectal and other cancers.  There are established screening and surgery guidelines for these syndromes. Mutations in the moderately penetrant genes increase the risk of colorectal and other cancers, but less is understood regarding their role in cancer risk. There may not be standard screening or management guidelines for individuals who have mutations in these genes.     Furthermore, we discussed the psychosocial implications of a positive result, including anxiety, fear and guilt if a mutation is passed on to a child. Mason expressed wanting to have information for his children.     Possible Results:    Positive (pathogenic or likely pathogenic variant): A genetic variant was found that is suspected or known to impact the function of the gene. The impact of a positive result on an individual's risk of cancer varies based on the gene, specific variant, individual's sex assigned at birth, personal cancer history, other health history (such as surgical history), and family history. A positive result can impact screening and risk management recommendations. However, there are not always available guidelines for management based on a specific gene variant. Family history and personal risk factors should always be considered. Sometimes, a positive result can also have treatment or reproductive implications.   Negative: No clinically significant variants were reported in the tested genes. A negative result does not indicate that an individual cannot develop cancer or even that the individual is at average risk. An individual may still be at an increased risk for cancer based on  personal risk factors or family history. Additionally, there could be a hereditary cancer predisposition that was not included in a chosen panel or is not detected with current technology.   Variant of Uncertain Significance (VUS): A variant was found. However, the lab does not have enough information to determine if the variant is benign (harmless) or pathogenic (impacts the function of the gene). The laboratory may update (reclassify) the variant over time as more information becomes available. When reclassified, most variants of uncertain significance are reclassified to benign/likely benign. Typically, it is not recommended to  based on the presence of a VUS. The chance of finding a VUS varies based on the test performed. Generally, the chance of finding a VUS increases with the number of genes tested and decreases with the amount of testing of that gene (genes that are tested more frequently or for a longer period of time have a lower VUS rate).    Genetic Mutation Inheritance:  When an individual has a gene mutation, their first-degree relatives (parents, children, and siblings) each have a 50% chance of carrying the same mutation. Other, more distant blood relatives can also be at risk of carrying the same mutation. At-risk relatives of an individual with a mutation should consider genetic testing to help determine their risk for cancer.     Genetic Discrimination: The Genetic Information Nondiscrimination Act of 2008 (FRAN) is a [place] federal law that provides some protections against the use of an individual's genetic information by their health insurer and by their employer. Title I of FRAN prohibits most health insurers (except for insurance obtained through a job with the  or the Federal Employees Health Benefits Plan) from utilizing an individual's genetic information to make decisions regarding insurance eligibility or premium charges. Title II of FRAN prohibits covered entities  "from requesting or requiring the genetic information of employees and applicants and from using said information to make employment decisions. This does not apply to employers with fewer than 15 employees or to the .  FRAN also does not protect individuals from genetic discrimination by any other type of policy or entity, including but not limited to life insurance, disability insurance, long-term care insurance,  benefits, and  Health Services benefits.    There is also a possibility for the patient to incur out-of-pocket costs related to this testing. Mason appeared to have a good understanding of the information as she asked appropriate questions.  Mason received comprehensive counseling regarding panel testing and has elected to proceed with this testing. A sample was scheduled to be submitted on 2/28/25 to [facility].  Mason's results should be available in approximately 3 weeks.  In the meantime, he is welcome to contact me if he has any questions, concerns, or updates to his family history.       ASSESSMENT / PLAN      Mason Rodriguez ("Mason"), 44 y.o., presented today for hereditary cancer risk assessment and genetic counseling given his recent diagnosis of colon cancer:  He meets NCCN Nicole genetic testing criteria given his young age at diagnosis  I am not overly suspicious of a hereditary cancer syndrome based on his family history   He understands screening recommendations for his relatives may or may not changed depending on these results    Brother and father recently had colonoscopies  Recommendation for children to start at 34yo  A blood sample was collected today 2/28/2025  I will call him in ~3 weeks with results      ICD-10-CM ICD-9-CM   1. Adenocarcinoma of sigmoid colon  C18.7 153.3   2. Secondary adenocarcinoma of lymph node  C77.9 196.9   3. Family history of lung cancer  Z80.1 V16.1   4. Family history of melanoma  Z80.8 V16.8   5. Family history of colon polyps, " unspecified  Z83.719 V18.51     1. Adenocarcinoma of sigmoid colon  - Ambulatory referral/consult to Genetics  - [facility] - Hereditary Cancer with RNA; Future    2. Secondary adenocarcinoma of lymph node  - [facility] - Hereditary Cancer with RNA; Future    3. Family history of lung cancer  - [facility] - Hereditary Cancer with RNA; Future    4. Family history of melanoma  - [facility] - Hereditary Cancer with RNA; Future    5. Family history of colon polyps, unspecified  - [facility] - Hereditary Cancer with RNA; Future       Genetic Test Information  Testing lab: [facility]   Test panel: xG     ICD-10 code(s): C18.7, C77.9, Z80.1, Z80.8, Z83.719   Verbal informed consent: Obtained   Written informed consent: Obtained   Specimen type: Blood  (Patient denies blood disorders that would necessitate a skin fibroblast specimen)   Specimen collection by: [facility]   Specimen collection date: 02/28/2025   Results expected by: Approximately 2-3 weeks after the genetic testing lab receives the specimen   Results disclosure plan: Post-test visit if positive or complex result; otherwise, results will be communicated through phone call       Follow-up:  No follow-ups on file.    Questions were encouraged and answered to the patient's satisfaction, and he verbalized understanding of the information and agreement with the plan.         Approximately 21 minutes were spent face-to-face with the patient.  Approximately 49 minutes in total were spent on the day of this encounter, which includes face-to-face time and non-face-to-face time preparing to see the patient (e.g., review of tests), obtaining and/or reviewing separately obtained history, documenting clinical information in the electronic or other health record, independently interpreting results (not separately reported) and communicating results to the patient/family/caregiver, or care coordination (not separately reported).     This assessment is based on the history and reports  provided, as well as the current scientific knowledge regarding cancer genetics.         Saritha Brown MS, [facility] [place]  Genetic Counselor, Hereditary and High-Risk Clinic  Department of Hematology and Oncology  [facility]        CC:  Dr. Edel Lerner

## 2025-02-17 NOTE — PROGRESS NOTES
February 17th, 2025.     Protocol NRG-, Colon Adjuvant Chemotherapy Based on Evaluation of Residual Disease (CIRCULATE-US) (NCT [id number]) Sponsor's Protocol #: NRG- IRB# 2022.200    Per Dr. Lerner's request, this CRC initially reached out to Mr. Rodriguez regarding potentially enrolling in the referenced study. The patient agreed to come to the [facility] to review the referenced study's informed consent today at 1:00.    The patient did not arrive to the [facility], and at approximately 1330, this CRC phoned the patient to follow up. During the call, the patient stated that he had thought about and decided that the study was not for him, and he was not interested pursuing the trial.   This CRC expressed my understanding and shared my appreciation that he had taken time to look over the consent that had been emailed to him. The patient was instructed that I would notify Dr. Lerner's clinic staff of his decision so they could move forward with standard of care treatment steps.    This CRC encouraged Mr. Rodriguez to contact me directly at  with any clinical trial-related questions. I expressed my appreciation for the patient's time and interest in the study.     Staff message sent to clinic staff via EPIC messaging..     TRUNG Reddy RN

## 2025-02-19 ENCOUNTER — OFFICE VISIT (OUTPATIENT)
Dept: PAIN MEDICINE | Facility: CLINIC | Age: 45
End: 2025-02-19
Payer: COMMERCIAL

## 2025-02-19 VITALS
HEART RATE: 75 BPM | HEIGHT: 74 IN | WEIGHT: 238.19 LBS | BODY MASS INDEX: 30.57 KG/M2 | SYSTOLIC BLOOD PRESSURE: 129 MMHG | DIASTOLIC BLOOD PRESSURE: 84 MMHG

## 2025-02-19 DIAGNOSIS — C77.9 SECONDARY ADENOCARCINOMA OF LYMPH NODE: ICD-10-CM

## 2025-02-19 DIAGNOSIS — C18.7 ADENOCARCINOMA OF SIGMOID COLON: ICD-10-CM

## 2025-02-19 DIAGNOSIS — M47.812 CERVICAL SPONDYLOSIS: ICD-10-CM

## 2025-02-19 DIAGNOSIS — M54.16 LUMBAR RADICULOPATHY: ICD-10-CM

## 2025-02-19 DIAGNOSIS — M47.816 LUMBAR SPONDYLOSIS: ICD-10-CM

## 2025-02-19 DIAGNOSIS — Z79.891 OPIOID CONTRACT EXISTS: Primary | ICD-10-CM

## 2025-02-19 DIAGNOSIS — R59.0 MEDIASTINAL ADENOPATHY: ICD-10-CM

## 2025-02-19 NOTE — PROGRESS NOTES
This note was completed with dictation software and grammatical errors may exist.    Chief Complaint   Patient presents with    Follow-up    Neck Pain    Low-back Pain        HPI: Mason Rodriguez is a 44 y.o. year old male patient who has a past medical history of Cancer, Cervical disc disease, Disc degeneration, lumbar, Disc disease, degenerative, cervical, and Lumbar disc disease. He presents in referral from No ref. provider found for back and neck pain.  Returns for follow-up with continued lower back pain with radiation down bilateral legs, 7/10, constant.  He reports some associated numbness and tingling in his legs.  His colon cancer has spread to 2 lymph nodes, he is scheduled to have port placed in start chemotherapy in March.  He reports 18in of his colon removed white 08/20/2025.  He continues to take hydrocodone 5/325 mg up to 3 times daily with some relief however feels like it is providing less relief than it did in the past.  He denies any ill side effects or adverse events with his medication      Initial history: The patient reports that he 1st injured his neck in 2006, this pain is located the base of his neck but feels that at this time it is tolerable and it is his back that is bothering him the most. He states that he has had back pain for over 10 years, states that it is hereditary.He has seen other specialists and has had several MRIs over the years.  He has done physical therapy numerous times.  While the pain has been severe, it has especially worsened in the last year and especially since this past October.  He states that the pain is across the bilateral low back and radiates into the posterior thighs, calves and into the feet.  It can be either leg.  States that he is fine when he is sitting but as soon as he starts to stand up and walk the pain becomes severe.  States that if he leans forward on a shopping cart he gets relief.  He denies any bladimir weakness, no bowel or bladder  incontinence.  He states that he can only stand for about 5 minutes before the pain becomes severe and he needs to sit down.    Pain intervention history:  He had done what sounds like lumbar medial branch blocks in the past without relief.He is status post L5/S1 interlaminar epidural steroid injection on 12/21/2022 with 0% relief.  S/p bilateral L3/4 transforaminal injection on 02/03/2023 with only about a day of moderate relief at best. He is status post bilateral L5/S1 EVONNE on 05/26/2023 with 0% relief.    Spine surgeries:    Antineuropathics:  Has tried gabapentin in the past without any relief.  NSAIDs:  Mobic 15 mg, ibuprofen 1200 mg at a time   Physical therapy:  Has done multiple rounds of physical therapy in the past without long term relief.  Antidepressants:  Cymbalta 30  Muscle relaxers:  Opioids:  Antiplatelets/Anticoagulants:    ROS:  Reports back pain, difficulty sleeping and anxiety.  Balance of review of systems is negative.    Lab Results   Component Value Date    HGBA1C 5.5 10/22/2024       Lab Results   Component Value Date    WBC 9.48 01/29/2025    HGB 13.5 (L) 01/29/2025    HCT 41.1 01/29/2025    MCV 91 01/29/2025     01/29/2025             Past Medical History:   Diagnosis Date    Cancer     sigmoid cancer    Cervical disc disease     Disc degeneration, lumbar     Disc disease, degenerative, cervical     Lumbar disc disease        Past Surgical History:   Procedure Laterality Date    cervical rhizotomy      COLON SURGERY      CYSTOSCOPY W/ URETERAL STENT PLACEMENT Left 01/28/2025    Procedure: CYSTOSCOPY, WITH URETERAL STENT INSERTION;  Surgeon: Vj Madden MD;  Location: [facility] OR;  Service: Urology;  Laterality: Left;    DV5 ROBOTIC COLECTOMY, SIGMOID N/A 01/28/2025    Procedure: DV5 ROBOTIC COLECTOMY, SIGMOID;  Surgeon: Mirza Golden MD;  Location: [facility] OR;  Service: General;  Laterality: N/A;  stents w/ sleeper or purhoit    ENDOBRONCHIAL ULTRASOUND Bilateral 01/03/2025     Procedure: ENDOBRONCHIAL ULTRASOUND (EBUS);  Surgeon: Jay Byrnes MD;  Location: [facility] OR;  Service: Pulmonary;  Laterality: Bilateral;    EPIDURAL STEROID INJECTION INTO LUMBAR SPINE N/A 12/21/2022    Procedure: Injection-steroid-epidural-lumbar L5/S1;  Surgeon: Vipin Cho MD;  Location: [facility] OR;  Service: Pain Management;  Laterality: N/A;    TRANSFORAMINAL EPIDURAL INJECTION OF STEROID Bilateral 02/03/2023    Procedure: Injection,steroid,epidural,transforaminal approach L3/4;  Surgeon: Vipin Cho MD;  Location: [facility] OR;  Service: Pain Management;  Laterality: Bilateral;    TRANSFORAMINAL EPIDURAL INJECTION OF STEROID Bilateral 05/26/2023    Procedure: Injection,steroid,epidural,transforaminal approach L5/S1 Bilat;  Surgeon: Vipin Cho MD;  Location: [facility] OR;  Service: Pain Management;  Laterality: Bilateral;  L5/S1 Bilat       Social History     Socioeconomic History    Marital status: Legally     Number of children: 4   Occupational History    Occupation: supervisor      Comment: [employer]   Tobacco Use    Smoking status: Never     Passive exposure: Past    Smokeless tobacco: Never   Substance and Sexual Activity    Alcohol use: Not Currently    Drug use: No    Sexual activity: Yes     Partners: Female   Social History Narrative    ** Merged History Encounter **          Social Drivers of Health     Financial Resource Strain: Low Risk  (2/6/2025)    Overall Financial Resource Strain (CARDIA)     Difficulty of Paying Living Expenses: Not hard at all   Recent Concern: Financial Resource Strain - Medium Risk (1/13/2025)    Received from [facility]    Overall Financial Resource Strain (CARDIA)     Difficulty of Paying Living Expenses: Somewhat hard   Food Insecurity: Food Insecurity Present (2/6/2025)    Hunger Vital Sign     Worried About Running Out of Food in the Last Year: Sometimes true     Ran Out of Food in the Last Year: Sometimes true  "  Transportation Needs: No Transportation Needs (1/29/2025)    TRANSPORTATION NEEDS     Transportation : No   Physical Activity: Sufficiently Active (2/6/2025)    Exercise Vital Sign     Days of Exercise per Week: 7 days     Minutes of Exercise per Session: 90 min   Stress: Stress Concern Present (2/6/2025)    [facility] [employer] [facility] - Occupational Stress Questionnaire     Feeling of Stress : To some extent   Housing Stability: Unknown (2/6/2025)    Housing Stability Vital Sign     Unable to Pay for Housing in the Last Year: No     Homeless in the Last Year: No         Medications/Allergies: See med card    Vitals:    02/19/25 0727   BP: 129/84   Pulse: 75   Weight: 108 kg (238 lb 3.3 oz)   Height: 6' 2" (1.88 m)   PainSc:   7   PainLoc: Neck       Body mass index is 30.58 kg/m².        2/19/2025     7:26 AM 11/19/2024     7:48 AM 8/19/2024     7:27 AM   Last 3 PDI Scores   Pain Disability Index (PDI) 45 [phone] 54         Physical exam:  Gen: A and O x3, pleasant, well-groomed  Skin: No rashes or obvious lesions  HEENT: PERRLA, no obvious deformities on ears or in canals. Trachea midline.  CVS: Regular rate and rhythm, normal palpable pulses.  Resp:No increased work of breathing, symmetrical chest rise.  Abdomen: Soft, NT/ND.  Musculoskeletal: No antalgic gait.     Neuro:    Iliopsoas Quadriceps Knee  Flexion Tibialis  anterior Gastro- cnemius EHL   Lower: R 5/5 5/5 5/5 5/5 5/5 5/5    L 5/5 5/5 5/5 5/5 5/5 5/5      Left  Right    Patellar DTR 1+ 1+   Achilles DTR 0+ 0+                    Sensation: intact to lt touch bilaterally in c4-t1   Reflexes: 2+ b/l Bicep, tricep, BR   ROM: Cervical ROM full, shoulder, elbow and wrist ROM full  Tone:  Normal at elbow, wrist and shoulder   Inspection: no atrophy of bicep, FDI or APB noted  Palpation: tender cervical paraspinals, levator scapula and trapezius     Motor:      Right Left   C4 Shoulder Abduction  5  5   C5 Elbow Flexion    5  5   C6 Wrist Extension  5 "  5   C7 Elbow Extension   5  5   C8/T1 Hand Intrinsics   5  5                              Intact and symmetrical to light touch and pinprick in L1-S1 dermatomes bilaterally.    Lumbar spine:  Lumbar spine: ROM is mildly reduced with flexion with slight increased pain, moderately reduced with extension with increased pain  Pedro's test causes no increased pain on either side.    Supine straight leg raise is negative bilaterally.    Internal and external rotation of the hip causes no increased pain on either side.  Myofascial exam: No tenderness to palpation across lumbar paraspinous muscles.      Imagin18 Xray L-spine:  Trace retrolisthesis of L4 on L5.  Alignment is otherwise within normal limits.  Vertebral body heights are maintained.  No acute fracture identified.  No high-grade osseous foraminal narrowing on oblique views.  Minor multilevel endplate changes.  Disc spaces are preserved.  No prevertebral soft tissue abnormality.    11 MRI C-spine:    1. MINIMAL C3-4 DISC BULGE WITH SEVERE LEFT AND MODERATE RIGHT FORAMINAL STENOSIS.     2. SEVERE BILATERAL FORAMINAL STENOSIS AT THE C4-5 LEVEL WHICH IS GREATER ON THE RIGHT THAN ON THE LEFT.     3. C5-6 MODERATE BILATERAL FORAMINAL STENOSIS.     4. MINIMAL C6-7 DISC BULGE WITH SEVERE LEFT AND MODERATE RIGHT FORAMINAL STENOSIS.     5. MILD BILATERAL FORAMINAL STENOSIS AT THE C7-T1 LEVEL.       X-ray lumbar spine 22:  There is grade 1 retrolisthesis of L2 on L3, L3 on L4 and L4 on L5.    MRI lumbar spine 2022:  Report only.  The lumbar spine canal demonstrates narrowed appearance likely on a developmental basis.  There is prominent epidural fat throughout the lumbar spine.  L1/2 no canal stenosis.  L2/3 there is mild disc desiccation and disc bulging, mild facet arthrosis.  There is mild bilateral neuroforaminal narrowing at this level.  At L3/4 there is a moderate broad-based disc bulge with mild-to-moderate bilateral facet arthrosis.   There is also slightly prominent epidural fat which contributes to thecal sac narrowing.  There is mild bilateral foraminal narrowing and 6-7 millimeter canal.  At L4/5 there is broad-based disc bulge ligamentum hypertrophy, prominent epidural fat and moderate bilateral facet arthropathy.  The AP diameter the central thecal sac is 8 millimeters.  There is moderate lateral recess narrowing and left greater than right neural foraminal narrowing.  At L5/S1 there is disc space narrowing, disc bulging superimposed left paracentral annular fissure, ligamentous hypertrophy and severe left greater than right facet hypertrophy.    MRI lumbar spine 04/18/2023  FINDINGS:  Alignment: Normal.     Vertebral column: Vertebral body heights are maintained.  No evidence of an acute fracture or aggressive marrow replacement process. Spinal canal appears diffusely small in caliber, likely on a developmental basis. Epic lipomatosis, most pronounced at L2-L4.  Multilevel disc degeneration, most pronounced at L5-S1.     Cord: Normal.  Conus terminates at L1.     Degenerative findings:     T12-L1: Disc is normal configuration.  Mild bilateral facet arthropathy.  No neural foraminal or spinal canal stenosis.  L1-L2: Disc is normal in configuration.  Mild bilateral facet arthropathy and ligamentum flavum thickening.  There is no neural foraminal stenosis.  There is no spinal canal stenosis.  L2-L3: Mild diffuse disc bulge.  Mild bilateral facet arthropathy and ligamentum flavum thickening.  Epidural lipomatosis.  Mild bilateral neural foraminal stenosis.  Moderate spinal canal stenosis.  L3-L4: Diffuse disc bulge.  Mild-to-moderate bilateral facet arthropathy.  Epidural lipomatosis.  Mild bilateral neural foraminal stenosis.  Severe spinal canal stenosis.  L4-L5: Diffuse disc bulge with small central disc protrusion.  Moderate bilateral facet arthropathy.  Ligamentum flavum thickening.  Epidural lipomatosis.  Moderate left and mild right  neural foraminal stenosis.  Moderate spinal canal stenosis.  L5-S1: Diffuse disc bulge.  Moderate left greater than right facet arthropathy.  Ligamentum flavum thickening.  Severe left and moderate right neural foraminal stenosis.  There is no spinal canal stenosis.    Assessment:  Mason Rodriguez is a 43 y.o. year old male patient who has a past medical history of Cervical disc disease, Disc degeneration, lumbar, Disc disease, degenerative, cervical, Hypertension, and Lumbar disc disease. He presents in referral from Dr. Marichuy Albert for back and neck pain.     1. Opioid contract exists        2. Cervical spondylosis        3. Lumbar radiculopathy        4. Mediastinal adenopathy        5. Adenocarcinoma of sigmoid colon        6. Secondary adenocarcinoma of lymph node        7. Lumbar spondylosis                  Plan:    He continues to have chronic lower back pain, likely from the narrowing in her lumbar spine.  He is requesting to increase his pain medication.  I will discuss further with Dr. Cho and consider increasing to hydrocodone 7.5/325 mg.  He is starting chemotherapy in March.  Discussed starting an anti neuropathic such as gabapentin or Lyrica, at this time we would like to hold off until completing his chemotherapy.  Follow up in 3 months or sooner if needed.

## 2025-02-20 ENCOUNTER — DOCUMENTATION ONLY (OUTPATIENT)
Dept: HEMATOLOGY/ONCOLOGY | Facility: CLINIC | Age: 45
End: 2025-02-20
Payer: COMMERCIAL

## 2025-02-20 DIAGNOSIS — M54.16 LUMBAR RADICULOPATHY: Primary | ICD-10-CM

## 2025-02-20 RX ORDER — HYDROCODONE BITARTRATE AND ACETAMINOPHEN 7.5; 325 MG/1; MG/1
1 TABLET ORAL EVERY 8 HOURS PRN
Qty: 90 TABLET | Refills: 0 | Status: SHIPPED | OUTPATIENT
Start: 2025-03-12 | End: 2025-04-11

## 2025-02-20 RX ORDER — HYDROCODONE BITARTRATE AND ACETAMINOPHEN 7.5; 325 MG/1; MG/1
1 TABLET ORAL EVERY 8 HOURS PRN
Qty: 90 TABLET | Refills: 0 | Status: SHIPPED | OUTPATIENT
Start: 2025-04-11 | End: 2025-05-11

## 2025-02-20 RX ORDER — HYDROCODONE BITARTRATE AND ACETAMINOPHEN 7.5; 325 MG/1; MG/1
1 TABLET ORAL EVERY 8 HOURS PRN
Qty: 90 TABLET | Refills: 0 | Status: SHIPPED | OUTPATIENT
Start: 2025-05-11 | End: 2025-06-10

## 2025-02-20 NOTE — NURSING
Chart review. Patient remains on track for education tomorrow, port was placed today, labs and fu with Dr. Lerner on 3/13 and treatment will start 3/14. Will continue to follow.

## 2025-02-20 NOTE — PROGRESS NOTES
PATIENT: Mason Rodriguez  MRN: [id number]  DATE: 2/21/2025      Diagnosis:   1. Adenocarcinoma of sigmoid colon    2. Secondary adenocarcinoma of lymph node        Chief Complaint: Patient Education (Chemo education class )          Subjective:    Interval History: Mr. Rodriguez is a 44 y.o. male who presents for Patient Education (OP COLORECTAL CAPECITABINE OXALIPLATIN )     Oncologic History:   Oncology History   Adenocarcinoma of sigmoid colon   1/2/2025 Initial Diagnosis    Adenocarcinoma of sigmoid colon     1/28/2025 Cancer Staged    Staging form: Colon and Rectum, AJCC 8th Edition  - Pathologic stage from 1/28/2025: Stage IIIA (pT2, pN1, cM0)     3/11/2025 -  Chemotherapy    Treatment Summary   Plan Name: OP COLORECTAL CAPECITABINE OXALIPLATIN  Treatment Goal: Curative  Status: Active  Start Date: 3/11/2025 (Planned)  End Date: 5/13/2025 (Planned)  Provider: Edel Lerner MD  Chemotherapy: capecitabine (XELODA) 500 MG Tab, 850 mg/m2 = 2,000 mg, Oral, 2 times daily, 0 of 1 cycle, Start date: --, End date: --  oxaliplatin (ELOXATIN) 130 mg/m2 = 308 mg in D5W 561.6 mL chemo infusion, 130 mg/m2 = 308 mg, Intravenous, Clinic/HOD 1 time, 0 of 4 cycles     Secondary adenocarcinoma of lymph node   2/11/2025 Initial Diagnosis    Secondary adenocarcinoma of lymph node     3/11/2025 -  Chemotherapy    Treatment Summary   Plan Name: OP COLORECTAL CAPECITABINE OXALIPLATIN  Treatment Goal: Curative  Status: Active  Start Date: 3/11/2025 (Planned)  End Date: 5/13/2025 (Planned)  Provider: Edel Lerner MD  Chemotherapy: capecitabine (XELODA) 500 MG Tab, 850 mg/m2 = 2,000 mg, Oral, 2 times daily, 0 of 1 cycle, Start date: --, End date: --  oxaliplatin (ELOXATIN) 130 mg/m2 = 308 mg in D5W 561.6 mL chemo infusion, 130 mg/m2 = 308 mg, Intravenous, Clinic/HOD 1 time, 0 of 4 cycles         Past Medical History:   Past Medical History:   Diagnosis Date    Cancer     sigmoid cancer    Cervical disc disease     Disc degeneration, lumbar      Disc disease, degenerative, cervical     Lumbar disc disease        Past Surgical HIstory:   Past Surgical History:   Procedure Laterality Date    cervical rhizotomy      COLON SURGERY      CYSTOSCOPY W/ URETERAL STENT PLACEMENT Left 01/28/2025    Procedure: CYSTOSCOPY, WITH URETERAL STENT INSERTION;  Surgeon: Vj Madden MD;  Location: [facility] OR;  Service: Urology;  Laterality: Left;    DV5 ROBOTIC COLECTOMY, SIGMOID N/A 01/28/2025    Procedure: DV5 ROBOTIC COLECTOMY, SIGMOID;  Surgeon: Mirza Golden MD;  Location: [facility] OR;  Service: General;  Laterality: N/A;  stents w/ sleeper or purhoit    ENDOBRONCHIAL ULTRASOUND Bilateral 01/03/2025    Procedure: ENDOBRONCHIAL ULTRASOUND (EBUS);  Surgeon: Jay Byrnes MD;  Location: [facility] OR;  Service: Pulmonary;  Laterality: Bilateral;    EPIDURAL STEROID INJECTION INTO LUMBAR SPINE N/A 12/21/2022    Procedure: Injection-steroid-epidural-lumbar L5/S1;  Surgeon: Vipin Cho MD;  Location: [facility] OR;  Service: Pain Management;  Laterality: N/A;    INSERTION OF TUNNELED CENTRAL VENOUS CATHETER (CVC) WITH SUBCUTANEOUS PORT Left 2/20/2025    Procedure: QHYCYPASP-CRKS-V-CATH;  Surgeon: Mirza Golden MD;  Location: [facility];  Service: General;  Laterality: Left;  Left Subclavian    TRANSFORAMINAL EPIDURAL INJECTION OF STEROID Bilateral 02/03/2023    Procedure: Injection,steroid,epidural,transforaminal approach L3/4;  Surgeon: Vipin Cho MD;  Location: [facility] OR;  Service: Pain Management;  Laterality: Bilateral;    TRANSFORAMINAL EPIDURAL INJECTION OF STEROID Bilateral 05/26/2023    Procedure: Injection,steroid,epidural,transforaminal approach L5/S1 Bilat;  Surgeon: Vipin Cho MD;  Location: [facility] OR;  Service: Pain Management;  Laterality: Bilateral;  L5/S1 Bilat       Family History:   Family History   Problem Relation Name Age of Onset    Depression Mother      Hypertension Father      Arthritis Father      Cancer Paternal Uncle  65        " lung cancer    Heart disease Maternal Grandmother      Heart disease Maternal Grandfather      Cancer Paternal Grandmother          not known       Social History:  reports that he has never smoked. He has been exposed to tobacco smoke. He has never used smokeless tobacco. He reports that he does not currently use alcohol. He reports that he does not use drugs.    Allergies:  Review of patient's allergies indicates:  No Known Allergies    Medications:  Current Medications[1]        ECOG Performance Status:   ECOG SCORE             Objective:      Vitals:   Vitals:    02/21/25 1343   BP: 120/88   BP Location: Left arm   Patient Position: Sitting   Pulse: 89   Resp: 16   Temp: 98.3 °F (36.8 °C)   TempSrc: Temporal   SpO2: 97%   Weight: 106.8 kg (235 lb 7.2 oz)   Height: 6' 3" (1.905 m)     BMI: Body mass index is 29.43 kg/m².    Laboratory Data:  No results found for this or any previous visit (from the past 24 hours).       Imaging: NM PET CT FDG SKULL BASE TO MID THIGH     CLINICAL HISTORY:  Incidental sigmoid colon carcinoma diagnosed on screening colonoscopy.  Abnormality in the right lower lobe on chest CT.     TECHNIQUE:  12.4 mCi of F18-FDG was administered intravenously in the right antecubital fossa.  After an approximately 60 min distribution time, PET/CT images were acquired from the skull base to the mid thigh. Transmission images were acquired to correct for attenuation using a whole body low-dose CT scan without contrast with the arms positioned above the head.  SUV mean/max in mediastinal blood pool is 1.77/2.08.     COMPARISON:  Outside CT chest, abdomen and pelvis 12/05/2024     FINDINGS:  Neck: There is no abnormal FDG uptake in the neck.  Corresponding low-dose noncontrast CT images demonstrate no significant findings.  Incidental mucocele right maxillary antrum.     Chest: Posterior to the right hilum there is a 12 x 13 mm oval nodule seen on series 3, image 112 which is hypermetabolic with an SUV " max of 6.2.  This is either a circumscribed nodule in the medial most aspect of the right lower lobe or a laterally located subcarinal lymph node.  There is patchy consolidation in the adjacent right lower lobe series 3, image 114, also with FDG uptake of up to SUV max 6.0.  Overall, the extent of consolidation in this location appears similar to the previous chest CT.     There is low level activity in a 9 mm short axis diameter subcarinal lymph node series 3, image 112, SUV max 3.7.  There is mildly increased uptake at the bilateral irene with SUV max of 3.7 on the right and 3.2 on the left.  These may be reactive nodes.     Incidental sebaceous cyst subcutaneous fat right upper back.     Abdomen/pelvis: There is physiologic activity in the colon.  A slightly higher level of activity is noted near the rectosigmoid junction, series 603, image 255, without definite corresponding CT abnormality.  This may be the site of recently resected polyp.  The uptake in this activity may be due to residual tumor, postprocedural activity or artifact from physiologic uptake.  There is no abnormal activity in lymph nodes or in the liver.     Mild fatty infiltration of the liver is present.  Gallbladder, spleen, adrenal glands, pancreas, kidneys grossly normal.  Aorta normal in caliber.  No adenopathy is present.  No ascites is present.  There is no free intraperitoneal fluid or air.  Urinary bladder unremarkable.     Bones: No sites of abnormal uptake are present in the bones.     Impression:     Hypermetabolic nodule or node posterior to the right hilum with adjacent consolidation.  The overall appearance on CT is more suggestive of an infectious/inflammatory process than metastatic malignancy, but close follow-up will be necessary to rule out a malignant etiology.  Alternatively, further evaluation with EBUS could be pursued.     Activity near the rectosigmoid junction may correspond to the site of malignant polyp resection with  differential possibilities as outlined above.  No metastatic disease to lymph nodes or liver.        Electronically signed by:Janice Dangelo Darrian  Date:                                            12/20/2024   Assessment:       1. Adenocarcinoma of sigmoid colon    2. Secondary adenocarcinoma of lymph node           Plan:     - Ambulatory referral/consult to Chemo School    1. Treatment plan of OP COLORECTAL CAPECITABINE OXALIPLATIN  every 21 days x 4 cycles discussed with patient.  2. Handouts provided on chemotherapy agents. Premeds, supportive meds explained.  3. Discussed the mechanism of action, potential side effects of this treatment as well as ways to manage them at home.   4. Dietary modifications discussed. Detail instructions to be provided by dietician.   5. Chemotherapy portfolio supplied with contact information.   6. Discussed follow-up with the physician for toxicity monitoring throughout treatment.    7. Scripts were escribed prior and explained for home use.   8. Consented the patient to the treatment plan and the patient was educated on the planned duration of the treatment and schedule of the treatment administration.  9. Cycle 1, Day 1 scheduled for 3/14/25; patient has home prescriptions.  10. Encouraged to call office - phone number provided - to assist with any concerns.         Plan was discussed with the patient at length, and he verbalized understanding. Mason was given an opportunity to ask questions that were answered to his satisfaction, and he was advised to call in the interval if any problems or questions arise.    Assessment/Plan reviewed and approved by Dr Lerner    30 minutes were spent in coordination of patient's care, record review and counseling.    MIKHAIL Calle, FNP-C  Hematology & Oncology         [1]   Current Outpatient Medications   Medication Sig Dispense Refill    EScitalopram oxalate (LEXAPRO) 20 MG tablet Take 1 tablet (20 mg total) by mouth once daily. 30  tablet 11    capecitabine (XELODA) 500 MG Tab Take 4 tablets (2,000 mg total) by mouth 2 (two) times daily on days 1-14 of a 21-day cycle x 4 cycles. 112 tablet 0    chlorhexidine (PERIDEX) 0.12 % solution Use as directed 10 mLs in the mouth or throat 2 (two) times daily. SWISH 10MLS FOR 30 SECONDS AND SPIT for 5 days 100 mL 0    chlorhexidine (PERIDEX) 0.12 % solution Use as directed 15 mLs in the mouth or throat 2 (two) times daily.      [START ON 3/12/2025] HYDROcodone-acetaminophen (NORCO) 7.5-325 mg per tablet Take 1 tablet by mouth every 8 (eight) hours as needed for Pain. 90 tablet 0    [START ON 4/11/2025] HYDROcodone-acetaminophen (NORCO) 7.5-325 mg per tablet Take 1 tablet by mouth every 8 (eight) hours as needed for Pain. 90 tablet 0    [START ON 5/11/2025] HYDROcodone-acetaminophen (NORCO) 7.5-325 mg per tablet Take 1 tablet by mouth every 8 (eight) hours as needed for Pain. 90 tablet 0    ibuprofen (ADVIL,MOTRIN) 200 MG tablet Take 800 mg by mouth every 8 (eight) hours as needed for Pain. (Patient not taking: Reported on 2/21/2025)      loperamide (IMODIUM) 2 mg capsule Diarrhea: take 2 tablets after first loose stool, followed by 1 tablet after each loose stool, up to 8 tablets/day 45 capsule 1    mupirocin (BACTROBAN) 2 % ointment by Nasal route 2 (two) times daily. APPLY 1 GM TO EACH NOSTRIL for 5 days 22 g 0    mupirocin (BACTROBAN) 2 % ointment by Nasal route 2 (two) times daily.      ondansetron (ZOFRAN) 8 MG tablet Take 1 tablet (8 mg total) by mouth every 6 (six) hours as needed. 60 tablet 2    prochlorperazine (COMPAZINE) 5 MG tablet Take 1 tablet (5 mg total) by mouth 3 (three) times daily as needed for Nausea. 60 tablet 1     No current facility-administered medications for this visit.

## 2025-02-21 ENCOUNTER — CLINICAL SUPPORT (OUTPATIENT)
Dept: HEMATOLOGY/ONCOLOGY | Facility: CLINIC | Age: 45
End: 2025-02-21
Payer: COMMERCIAL

## 2025-02-21 ENCOUNTER — DOCUMENTATION ONLY (OUTPATIENT)
Dept: INFUSION THERAPY | Facility: HOSPITAL | Age: 45
End: 2025-02-21
Payer: COMMERCIAL

## 2025-02-21 VITALS
DIASTOLIC BLOOD PRESSURE: 88 MMHG | HEART RATE: 89 BPM | HEIGHT: 75 IN | WEIGHT: 235.44 LBS | RESPIRATION RATE: 16 BRPM | BODY MASS INDEX: 29.27 KG/M2 | OXYGEN SATURATION: 97 % | SYSTOLIC BLOOD PRESSURE: 120 MMHG | TEMPERATURE: 98 F

## 2025-02-21 DIAGNOSIS — C18.7 ADENOCARCINOMA OF SIGMOID COLON: Primary | ICD-10-CM

## 2025-02-21 DIAGNOSIS — C77.9 SECONDARY ADENOCARCINOMA OF LYMPH NODE: ICD-10-CM

## 2025-02-21 NOTE — PROGRESS NOTES
Oncology Nutrition   New Patient Education  Mason Rodriguez   1980    Nutrition Education   This is a 44 y.o.male with a medical diagnosis of adenocarcinoma of sigmoid colon.     Met w/ pt to discuss current nutritional status and nutrition as it relates to cancer and cancer treatment. Pt currently low nutrition risk. Pt denies any current nutrition issues. Pt emotional and RD provided support as appropriate. RD discussed role in POC.     Wt Readings from Last 10 Encounters:   02/21/25 106.8 kg (235 lb 7.2 oz)   02/20/25 106.7 kg (235 lb 3.7 oz)   02/19/25 108 kg (238 lb 3.3 oz)   02/12/25 107.3 kg (236 lb 8.9 oz)   02/11/25 107.3 kg (236 lb 8.9 oz)   01/28/25 108.9 kg (240 lb 1.3 oz)   01/03/25 111.1 kg (245 lb)   01/02/25 111.1 kg (245 lb)   12/30/24 112 kg (247 lb)   12/09/24 112 kg (247 lb)      [x] PMHx reviewed  [x] Labs reviewed    Educated on food safety and common nutrition impact symptoms associated with chemotherapy treatment. Reinforced the importance of good hydration. Handouts provided.    Answered all nutrition related questions.     Patient provided with dietitian contact number and advised to call with questions or make future appointment if further intervention is needed. RD to follow throughout tx prn.    Christine Kaufman, DARIA, LDN  02/21/2025  3:40 PM

## 2025-02-25 ENCOUNTER — PATIENT MESSAGE (OUTPATIENT)
Dept: PALLIATIVE MEDICINE | Facility: CLINIC | Age: 45
End: 2025-02-25
Payer: COMMERCIAL

## 2025-02-28 ENCOUNTER — LAB VISIT (OUTPATIENT)
Dept: LAB | Facility: HOSPITAL | Age: 45
End: 2025-02-28
Attending: INTERNAL MEDICINE
Payer: COMMERCIAL

## 2025-02-28 ENCOUNTER — OFFICE VISIT (OUTPATIENT)
Dept: HEMATOLOGY/ONCOLOGY | Facility: CLINIC | Age: 45
End: 2025-02-28
Payer: COMMERCIAL

## 2025-02-28 DIAGNOSIS — C18.7 ADENOCARCINOMA OF SIGMOID COLON: ICD-10-CM

## 2025-02-28 DIAGNOSIS — Z80.1 FAMILY HISTORY OF LUNG CANCER: ICD-10-CM

## 2025-02-28 DIAGNOSIS — Z80.8 FAMILY HISTORY OF MELANOMA: ICD-10-CM

## 2025-02-28 DIAGNOSIS — C18.7 ADENOCARCINOMA OF SIGMOID COLON: Primary | ICD-10-CM

## 2025-02-28 DIAGNOSIS — C77.9 SECONDARY ADENOCARCINOMA OF LYMPH NODE: ICD-10-CM

## 2025-02-28 DIAGNOSIS — Z83.719 FAMILY HISTORY OF COLON POLYPS, UNSPECIFIED: ICD-10-CM

## 2025-02-28 PROCEDURE — 99999 PR PBB SHADOW E&M-EST. PATIENT-LVL II: CPT | Mod: PBBFAC,,, | Performed by: BEHAVIOR TECHNICIAN

## 2025-02-28 PROCEDURE — 36415 COLL VENOUS BLD VENIPUNCTURE: CPT | Mod: PN

## 2025-03-13 ENCOUNTER — LAB VISIT (OUTPATIENT)
Dept: LAB | Facility: HOSPITAL | Age: 45
End: 2025-03-13
Attending: INTERNAL MEDICINE
Payer: COMMERCIAL

## 2025-03-13 ENCOUNTER — OFFICE VISIT (OUTPATIENT)
Dept: HEMATOLOGY/ONCOLOGY | Facility: CLINIC | Age: 45
End: 2025-03-13
Payer: COMMERCIAL

## 2025-03-13 VITALS
TEMPERATURE: 98 F | HEIGHT: 75 IN | RESPIRATION RATE: 20 BRPM | OXYGEN SATURATION: 96 % | DIASTOLIC BLOOD PRESSURE: 80 MMHG | HEART RATE: 69 BPM | WEIGHT: 240.94 LBS | BODY MASS INDEX: 29.96 KG/M2 | SYSTOLIC BLOOD PRESSURE: 125 MMHG

## 2025-03-13 DIAGNOSIS — C18.7 ADENOCARCINOMA OF SIGMOID COLON: Primary | ICD-10-CM

## 2025-03-13 DIAGNOSIS — C77.9 SECONDARY ADENOCARCINOMA OF LYMPH NODE: ICD-10-CM

## 2025-03-13 DIAGNOSIS — T45.1X5A IMMUNODEFICIENCY DUE TO CHEMOTHERAPY: ICD-10-CM

## 2025-03-13 DIAGNOSIS — D84.821 IMMUNODEFICIENCY DUE TO CHEMOTHERAPY: ICD-10-CM

## 2025-03-13 DIAGNOSIS — C18.7 ADENOCARCINOMA OF SIGMOID COLON: ICD-10-CM

## 2025-03-13 DIAGNOSIS — Z79.899 IMMUNODEFICIENCY DUE TO CHEMOTHERAPY: ICD-10-CM

## 2025-03-13 PROBLEM — Z79.69 IMMUNODEFICIENCY DUE TO CHEMOTHERAPY: Status: ACTIVE | Noted: 2025-03-13

## 2025-03-13 LAB
ALBUMIN SERPL BCP-MCNC: 3.8 G/DL (ref 3.5–5.2)
ALP SERPL-CCNC: 49 U/L (ref 40–150)
ALT SERPL W/O P-5'-P-CCNC: 35 U/L (ref 10–44)
ANION GAP SERPL CALC-SCNC: 9 MMOL/L (ref 8–16)
AST SERPL-CCNC: 23 U/L (ref 10–40)
BASOPHILS # BLD AUTO: 0.03 K/UL (ref 0–0.2)
BASOPHILS NFR BLD: 0.4 % (ref 0–1.9)
BILIRUB SERPL-MCNC: 0.3 MG/DL (ref 0.1–1)
BUN SERPL-MCNC: 21 MG/DL (ref 6–20)
CALCIUM SERPL-MCNC: 8.7 MG/DL (ref 8.7–10.5)
CHLORIDE SERPL-SCNC: 105 MMOL/L (ref 95–110)
CO2 SERPL-SCNC: 25 MMOL/L (ref 23–29)
CREAT SERPL-MCNC: 1.1 MG/DL (ref 0.5–1.4)
DIFFERENTIAL METHOD BLD: NORMAL
EOSINOPHIL # BLD AUTO: 0.2 K/UL (ref 0–0.5)
EOSINOPHIL NFR BLD: 3.1 % (ref 0–8)
ERYTHROCYTE [DISTWIDTH] IN BLOOD BY AUTOMATED COUNT: 12.7 % (ref 11.5–14.5)
EST. GFR  (NO RACE VARIABLE): >60 ML/MIN/1.73 M^2
GLUCOSE SERPL-MCNC: 106 MG/DL (ref 70–110)
HCT VFR BLD AUTO: 42.6 % (ref 40–54)
HGB BLD-MCNC: 14.3 G/DL (ref 14–18)
IMM GRANULOCYTES # BLD AUTO: 0.02 K/UL (ref 0–0.04)
IMM GRANULOCYTES NFR BLD AUTO: 0.3 % (ref 0–0.5)
LYMPHOCYTES # BLD AUTO: 1.9 K/UL (ref 1–4.8)
LYMPHOCYTES NFR BLD: 27.6 % (ref 18–48)
MCH RBC QN AUTO: 30.6 PG (ref 27–31)
MCHC RBC AUTO-ENTMCNC: 33.6 G/DL (ref 32–36)
MCV RBC AUTO: 91 FL (ref 82–98)
MONOCYTES # BLD AUTO: 0.5 K/UL (ref 0.3–1)
MONOCYTES NFR BLD: 6.8 % (ref 4–15)
NEUTROPHILS # BLD AUTO: 4.2 K/UL (ref 1.8–7.7)
NEUTROPHILS NFR BLD: 61.8 % (ref 38–73)
NRBC BLD-RTO: 0 /100 WBC
PLATELET # BLD AUTO: 248 K/UL (ref 150–450)
PMV BLD AUTO: 9.3 FL (ref 9.2–12.9)
POTASSIUM SERPL-SCNC: 4.5 MMOL/L (ref 3.5–5.1)
PROT SERPL-MCNC: 7.2 G/DL (ref 6–8.4)
RBC # BLD AUTO: 4.68 M/UL (ref 4.6–6.2)
SODIUM SERPL-SCNC: 139 MMOL/L (ref 136–145)
WBC # BLD AUTO: 6.77 K/UL (ref 3.9–12.7)

## 2025-03-13 PROCEDURE — 3079F DIAST BP 80-89 MM HG: CPT | Mod: CPTII,S$GLB,, | Performed by: INTERNAL MEDICINE

## 2025-03-13 PROCEDURE — 3074F SYST BP LT 130 MM HG: CPT | Mod: CPTII,S$GLB,, | Performed by: INTERNAL MEDICINE

## 2025-03-13 PROCEDURE — 80053 COMPREHEN METABOLIC PANEL: CPT | Mod: PN | Performed by: INTERNAL MEDICINE

## 2025-03-13 PROCEDURE — 99215 OFFICE O/P EST HI 40 MIN: CPT | Mod: S$GLB,,, | Performed by: INTERNAL MEDICINE

## 2025-03-13 PROCEDURE — 99999 PR PBB SHADOW E&M-EST. PATIENT-LVL IV: CPT | Mod: PBBFAC,,, | Performed by: INTERNAL MEDICINE

## 2025-03-13 PROCEDURE — 3008F BODY MASS INDEX DOCD: CPT | Mod: CPTII,S$GLB,, | Performed by: INTERNAL MEDICINE

## 2025-03-13 PROCEDURE — G2211 COMPLEX E/M VISIT ADD ON: HCPCS | Mod: S$GLB,,, | Performed by: INTERNAL MEDICINE

## 2025-03-13 PROCEDURE — 1159F MED LIST DOCD IN RCRD: CPT | Mod: CPTII,S$GLB,, | Performed by: INTERNAL MEDICINE

## 2025-03-13 PROCEDURE — 85025 COMPLETE CBC W/AUTO DIFF WBC: CPT | Mod: PN | Performed by: INTERNAL MEDICINE

## 2025-03-13 PROCEDURE — 36415 COLL VENOUS BLD VENIPUNCTURE: CPT | Mod: PN | Performed by: INTERNAL MEDICINE

## 2025-03-13 RX ORDER — HEPARIN 100 UNIT/ML
500 SYRINGE INTRAVENOUS
Status: CANCELLED | OUTPATIENT
Start: 2025-03-14

## 2025-03-13 RX ORDER — SODIUM CHLORIDE 0.9 % (FLUSH) 0.9 %
10 SYRINGE (ML) INJECTION
Status: CANCELLED | OUTPATIENT
Start: 2025-03-14

## 2025-03-13 RX ORDER — DIPHENHYDRAMINE HYDROCHLORIDE 50 MG/ML
50 INJECTION, SOLUTION INTRAMUSCULAR; INTRAVENOUS ONCE AS NEEDED
Status: CANCELLED | OUTPATIENT
Start: 2025-03-14

## 2025-03-13 RX ORDER — PROCHLORPERAZINE EDISYLATE 5 MG/ML
5 INJECTION INTRAMUSCULAR; INTRAVENOUS ONCE AS NEEDED
Status: CANCELLED
Start: 2025-03-14

## 2025-03-13 RX ORDER — LIDOCAINE AND PRILOCAINE 25; 25 MG/G; MG/G
CREAM TOPICAL
Qty: 30 G | Refills: 6 | Status: SHIPPED | OUTPATIENT
Start: 2025-03-13

## 2025-03-13 RX ORDER — EPINEPHRINE 0.3 MG/.3ML
0.3 INJECTION SUBCUTANEOUS ONCE AS NEEDED
Status: CANCELLED | OUTPATIENT
Start: 2025-03-14

## 2025-03-13 NOTE — PROGRESS NOTES
"Subjective     Patient ID: Mason Rodriguez is a 44 y.o. male.    Chief Complaint: Follow-up (FU and Labs/Adenocarcinoma of sigmoid colon)  Mr. Rodriguez is a 44-year-old male who underwent a screening colonoscopy on 11/26/2024 and was noted to have a 17 mm pedunculated polyp in the sigmoid colon, pathology revealed adenocarcinoma arising in a tubular adenoma.    On 01/28/2025 he underwent robotic sigmoid colectomy  Pathology revealed metastatic adenocarcinoma in the sigmoid colon in 1 of the 13  lymph nodes ( see addendum below) .   PROCEDURE: Sigmoidectomy   TUMOR SITE: Sigmoid colon   HISTOLOGIC TYPE: Adenocarcinoma arising in a tubular adenoma   HISTOLOGIC GRADE: Moderately differentiated   TUMOR SIZE: Polyp largely replaced by invasive moderately differentiated    adenocarcinoma   MULTIPLE PRIMARY SITES: Negative   TUMOR EXTENT: Extending through muscularis mucosa into submucosa   SUB-MUCOSAL INVASION: Minimal (polypectomy margin only)   LYMPHATIC AND/OR VASCULAR INVASION: Negative   PERINEURAL INVASION: Negative   TUMOR BUDDING SCORE: Negative   MARGIN STATUS FOR INVASIVE CARCINOMA: Negative   MARGIN STATUS FOR NON-INVASIVE TUMOR: Negative   DNA MISMATCH REPAIR STATUS (MMR): Pending on bishop metastasis.   REGIONAL LYMPH NODE STATUS:   NUMBER OF LYMPH NODES WITH TUMOR: At least one (1)   NUMBER OF LYMPH NODES EXAMINED: Two (2) - additional pending   TUMOR DEPOSITS: Pending   PATHOLOGIC STAGE CLASSIFICATION: pT1 pN pending, at least N1a   The addtional sections described below yield eleven additional lymph    nodes, all negative for tumor. The final node assessment is metastatic    carcinoma in one of thirteen lymph nodes (1/13) with the final bishop    stage pN1a.   Immunohistochemistry (IHC) Results for Mismatch Repair (MMR) Proteins WAYNE    Off note he had a PET scan on 12/20/2024 which revealed "Hypermetabolic nodule or node posterior to the right hilum with adjacent consolidation.  The overall appearance on CT is " "more suggestive of an infectious/inflammatory process than metastatic malignancy, but close follow-up will be necessary to rule out a malignant etiology.  Alternatively, further evaluation with EBUS could be pursued. Activity near the rectosigmoid junction may correspond to the site of malignant polyp resection with differential possibilities as outlined above.  No metastatic disease to lymph nodes or liver"     He underwent EBUS on 1/3/2025 and pathology revealed RIGHT LOWER LOBE LUNG NODULE FINE NEEDLE ASPIRATE. NEGATIVE FOR MALIGNANT CELLS. NON-CASEATING GRANULOMA/GIANT CELLS PRESENT"    HPIHe comes in to start adjuvant chemo with XELOX       H denies any nausea, vomiting, diarrhea, constipation, abdominal pain, weight loss or loss of appetite, chest pain, shortness of breath, leg swelling, fatigue, pain, headache, dizziness, or mood changes. His ECOG PS is zero. He is here with his wife      Review of Systems   Constitutional:  Negative for appetite change, fatigue and unexpected weight change.   HENT:  Negative for mouth sores.    Eyes:  Negative for visual disturbance.   Respiratory:  Negative for cough and shortness of breath.    Cardiovascular:  Negative for chest pain.   Gastrointestinal:  Negative for abdominal pain and diarrhea.   Genitourinary:  Negative for frequency.   Musculoskeletal:  Negative for back pain.   Integumentary:  Negative for rash.   Neurological:  Negative for headaches.   Hematological:  Negative for adenopathy.   Psychiatric/Behavioral:  The patient is not nervous/anxious.    All other systems reviewed and are negative.         Objective     Physical Exam  Vitals reviewed.   Constitutional:       Appearance: He is well-developed.   HENT:      Mouth/Throat:      Pharynx: No oropharyngeal exudate.   Cardiovascular:      Rate and Rhythm: Normal rate.      Heart sounds: Normal heart sounds.   Pulmonary:      Effort: Pulmonary effort is normal.      Breath sounds: Normal breath sounds. No " wheezing.   Chest:      Comments: Left chest PORT+  Abdominal:      General: Bowel sounds are normal.      Palpations: Abdomen is soft.      Tenderness: There is no abdominal tenderness.   Musculoskeletal:         General: No tenderness.   Lymphadenopathy:      Cervical: No cervical adenopathy.   Skin:     General: Skin is warm and dry.      Findings: No rash.   Neurological:      Mental Status: He is alert and oriented to person, place, and time.      Coordination: Coordination normal.   Psychiatric:         Thought Content: Thought content normal.         Judgment: Judgment normal.           LABS:  WBC   Date Value Ref Range Status   03/13/2025 6.77 3.90 - 12.70 K/uL Final     Hemoglobin   Date Value Ref Range Status   03/13/2025 14.3 14.0 - 18.0 g/dL Final     Hematocrit   Date Value Ref Range Status   03/13/2025 42.6 40.0 - 54.0 % Final     Platelets   Date Value Ref Range Status   03/13/2025 248 150 - 450 K/uL Final     Gran # (ANC)   Date Value Ref Range Status   03/13/2025 4.2 1.8 - 7.7 K/uL Final     Gran %   Date Value Ref Range Status   03/13/2025 61.8 38.0 - 73.0 % Final       Chemistry        Component Value Date/Time     03/13/2025 0756    K 4.5 03/13/2025 0756     03/13/2025 0756    CO2 25 03/13/2025 0756    BUN 21 (H) 03/13/2025 0756    CREATININE 1.1 03/13/2025 0756     03/13/2025 0756        Component Value Date/Time    CALCIUM 8.7 03/13/2025 0756    ALKPHOS 49 03/13/2025 0756    AST 23 03/13/2025 0756    ALT 35 03/13/2025 0756    BILITOT 0.3 03/13/2025 0756    ESTGFRAFRICA >60 08/13/2020 0849    EGFRNONAA >60 08/13/2020 0849             Assessment and Plan     1. Adenocarcinoma of sigmoid colon  -     LIDOcaine-prilocaine (EMLA) cream; Apply topically as needed. Apply to PORT site 30 minutes prior to chemo,. Cover with mark wrap  Dispense: 30 g; Refill: 6    2. Secondary adenocarcinoma of lymph node    3. Immunodeficiency due to chemotherapy    Other orders  -     palonosetron  0.25mg/dexAMETHasone 12mg in NS IVPB 0.25 mg 50 mL  -     oxaliplatin (ELOXATIN) 130 mg/m2 = 308 mg in D5W 561.6 mL chemo infusion  -     prochlorperazine injection Soln 5 mg  -     EPINEPHrine (EPIPEN) 0.3 mg/0.3 mL pen injection 0.3 mg  -     diphenhydrAMINE injection 50 mg  -     hydrocortisone sodium succinate injection 100 mg  -     D5W 250 mL flush bag  -     sodium chloride 0.9% flush 10 mL  -     heparin, porcine (PF) 100 unit/mL injection flush 500 Units  -     alteplase injection 2 mg        Route Chart for Scheduling    Med Onc Chart Routing      Follow up with physician . In 6 weeks schedule CBC, CMP and see me and for Oxaliplatin. Labs and me on thursday, chemo on Friday   Follow up with YOSEPH . In 3 weeks schedule CBC. CMP and see YOSEPH and for Oxaliplatin. Labs and YOSEPH on a Thursday, Chemo on Friday   Infusion scheduling note    Injection scheduling note    Labs    Imaging    Pharmacy appointment    Other referrals                  Treatment Plan Information   OP COLORECTAL CAPECITABINE OXALIPLATIN Edel Lerner MD   Associated diagnosis: Secondary adenocarcinoma of lymph node   noted on 2/11/2025  Associated diagnosis: Adenocarcinoma of sigmoid colon Stage IIIA pT2, pN1, cM0 noted on 1/2/2025   Line of treatment: Adjuvant  Treatment Goal: Curative     Upcoming Treatment Dates - OP COLORECTAL CAPECITABINE OXALIPLATIN    3/13/2025       Pre-Medications       palonosetron 0.25mg/dexAMETHasone 12mg in NS IVPB 0.25 mg 50 mL       Chemotherapy       oxaliplatin (ELOXATIN) 130 mg/m2 = 308 mg in D5W 561.6 mL chemo infusion  4/3/2025       Pre-Medications       palonosetron 0.25mg/dexAMETHasone 12mg in NS IVPB 0.25 mg 50 mL       Chemotherapy       oxaliplatin (ELOXATIN) 130 mg/m2 = 308 mg in D5W 561.6 mL chemo infusion  4/24/2025       Pre-Medications       palonosetron 0.25mg/dexAMETHasone 12mg in NS IVPB 0.25 mg 50 mL       Chemotherapy       oxaliplatin (ELOXATIN) 130 mg/m2 = 308 mg in D5W 561.6 mL chemo  infusion  5/15/2025       Pre-Medications       palonosetron 0.25mg/dexAMETHasone 12mg in NS IVPB 0.25 mg 50 mL       Chemotherapy       oxaliplatin (ELOXATIN) 130 mg/m2 = 308 mg in D5W 561.6 mL chemo infusion           Mr. Rodriguez is doing well clinically and will proceed with adjuvant chemotherapy with XELOX and will return in 3 weeks for next cycle  Rediscussed side effect profile and escribed EMLA cream  Reviewed appropriate use of Xeloda     Had previously reviewed clinical trial  which he declined   Visit today included increased complexity associated with the care of the episodic problem adjuvant chemo addressed and managing the longitudinal care of the patient due to the serious and/or complex managed problem(s) colon cancer    Above care plan was discussed with patient and accompanying wife and all questions were addressed to their satisfaction

## 2025-03-14 ENCOUNTER — DOCUMENTATION ONLY (OUTPATIENT)
Dept: INFUSION THERAPY | Facility: HOSPITAL | Age: 45
End: 2025-03-14
Payer: COMMERCIAL

## 2025-03-14 ENCOUNTER — INFUSION (OUTPATIENT)
Dept: INFUSION THERAPY | Facility: HOSPITAL | Age: 45
End: 2025-03-14
Attending: INTERNAL MEDICINE
Payer: COMMERCIAL

## 2025-03-14 VITALS
TEMPERATURE: 98 F | RESPIRATION RATE: 16 BRPM | SYSTOLIC BLOOD PRESSURE: 124 MMHG | BODY MASS INDEX: 29.35 KG/M2 | WEIGHT: 234.81 LBS | HEART RATE: 65 BPM | DIASTOLIC BLOOD PRESSURE: 77 MMHG | OXYGEN SATURATION: 96 %

## 2025-03-14 DIAGNOSIS — C77.9 SECONDARY ADENOCARCINOMA OF LYMPH NODE: Primary | ICD-10-CM

## 2025-03-14 DIAGNOSIS — C18.7 ADENOCARCINOMA OF SIGMOID COLON: ICD-10-CM

## 2025-03-14 DIAGNOSIS — M62.838 MUSCLE SPASMS OF BOTH LOWER EXTREMITIES: Primary | ICD-10-CM

## 2025-03-14 PROCEDURE — 63600175 PHARM REV CODE 636 W HCPCS: Mod: PN | Performed by: INTERNAL MEDICINE

## 2025-03-14 PROCEDURE — 96415 CHEMO IV INFUSION ADDL HR: CPT | Mod: PN

## 2025-03-14 PROCEDURE — 25000003 PHARM REV CODE 250: Mod: PN | Performed by: INTERNAL MEDICINE

## 2025-03-14 PROCEDURE — 96367 TX/PROPH/DG ADDL SEQ IV INF: CPT | Mod: PN

## 2025-03-14 PROCEDURE — 96413 CHEMO IV INFUSION 1 HR: CPT | Mod: PN

## 2025-03-14 RX ORDER — SODIUM CHLORIDE 0.9 % (FLUSH) 0.9 %
10 SYRINGE (ML) INJECTION
Status: DISCONTINUED | OUTPATIENT
Start: 2025-03-14 | End: 2025-03-14 | Stop reason: HOSPADM

## 2025-03-14 RX ORDER — CYCLOBENZAPRINE HCL 10 MG
10 TABLET ORAL 3 TIMES DAILY PRN
Qty: 90 TABLET | Refills: 0 | Status: SHIPPED | OUTPATIENT
Start: 2025-03-14 | End: 2025-04-13

## 2025-03-14 RX ORDER — DIPHENHYDRAMINE HYDROCHLORIDE 50 MG/ML
50 INJECTION, SOLUTION INTRAMUSCULAR; INTRAVENOUS ONCE AS NEEDED
Status: DISCONTINUED | OUTPATIENT
Start: 2025-03-14 | End: 2025-03-14 | Stop reason: HOSPADM

## 2025-03-14 RX ORDER — EPINEPHRINE 0.3 MG/.3ML
0.3 INJECTION SUBCUTANEOUS ONCE AS NEEDED
Status: DISCONTINUED | OUTPATIENT
Start: 2025-03-14 | End: 2025-03-14 | Stop reason: HOSPADM

## 2025-03-14 RX ORDER — HEPARIN 100 UNIT/ML
500 SYRINGE INTRAVENOUS
Status: DISCONTINUED | OUTPATIENT
Start: 2025-03-14 | End: 2025-03-14 | Stop reason: HOSPADM

## 2025-03-14 RX ORDER — PROCHLORPERAZINE EDISYLATE 5 MG/ML
5 INJECTION INTRAMUSCULAR; INTRAVENOUS ONCE AS NEEDED
Status: DISCONTINUED | OUTPATIENT
Start: 2025-03-14 | End: 2025-03-14 | Stop reason: HOSPADM

## 2025-03-14 RX ADMIN — DEXTROSE MONOHYDRATE: 5 INJECTION, SOLUTION INTRAVENOUS at 08:03

## 2025-03-14 RX ADMIN — SODIUM CHLORIDE 0.25 MG: 9 INJECTION, SOLUTION INTRAVENOUS at 09:03

## 2025-03-14 RX ADMIN — OXALIPLATIN 300 MG: 5 INJECTION, SOLUTION INTRAVENOUS at 09:03

## 2025-03-14 NOTE — TELEPHONE ENCOUNTER
----- Message from Randi sent at 3/14/2025  1:53 PM CDT -----  Type: Needs Medical AdviceWho Called:  Karli (spouse)Symptoms (please be specific):  How long has patient had these symptoms:  Pharmacy name and phone #:  Best Call Back Number: [phone] Information: Karli is requesting a call back from nurse

## 2025-03-14 NOTE — PROGRESS NOTES
Nutrition Note  RD met with pt at chairside during first infusion of oxaliplatin. Pt denies any questions or concerns from chemo school. RD reinforced role in POC.    Wt Readings from Last 10 Encounters:   03/14/25 106.5 kg (234 lb 12.6 oz)   03/13/25 109.3 kg (240 lb 15.4 oz)   02/21/25 106.8 kg (235 lb 7.2 oz)   02/20/25 106.7 kg (235 lb 3.7 oz)   02/19/25 108 kg (238 lb 3.3 oz)   02/12/25 107.3 kg (236 lb 8.9 oz)   02/11/25 107.3 kg (236 lb 8.9 oz)   01/28/25 108.9 kg (240 lb 1.3 oz)   01/03/25 111.1 kg (245 lb)   01/02/25 111.1 kg (245 lb)     Christine Kaufman, WILLIAMN, LDN

## 2025-03-14 NOTE — TELEPHONE ENCOUNTER
Spoke with patient.  He notes severe muscle cramps in back and legs bilaterally since infusion ended today. Patient received c1 oxaliplatin today. He states you can physically see him muscles cramping. He states it is constant.     Message routed to dr bassett

## 2025-03-14 NOTE — PROGRESS NOTES
LCSW met with patient at the chairside during his first infusion. Patient denied any emotional or practical needs at this time.  encouraged the patient to reach out if any needs arise.

## 2025-03-17 ENCOUNTER — PATIENT MESSAGE (OUTPATIENT)
Dept: HEMATOLOGY/ONCOLOGY | Facility: CLINIC | Age: 45
End: 2025-03-17
Payer: COMMERCIAL

## 2025-03-17 DIAGNOSIS — M62.838 MUSCLE SPASMS OF BOTH LOWER EXTREMITIES: Primary | ICD-10-CM

## 2025-03-17 RX ORDER — BACLOFEN 10 MG/1
10 TABLET ORAL 3 TIMES DAILY
Qty: 90 TABLET | Refills: 11 | Status: SHIPPED | OUTPATIENT
Start: 2025-03-17 | End: 2026-03-17

## 2025-03-17 NOTE — TELEPHONE ENCOUNTER
He only needs to take it as needed, how often is he taking it.   He has to avoid cold for 4-5 days after chemo, maybe longer in some cases

## 2025-03-17 NOTE — TELEPHONE ENCOUNTER
"Spoke with patient.  He notes muscle relaxer makes the leg cramps go away----but it makes him so drowsy he can't function. "I do not want to sleep my life away."      He notes his hands are pins and needles really badly---as he works outside. Today the temperature is cool---so this SE is worse.      Nurse will speak with dr bassett to see what her thoughts are. He states he can't tolerate the leg cramps without the medication----but can't take the medication.        Message routed to dr bassett   "

## 2025-03-17 NOTE — TELEPHONE ENCOUNTER
He took one tablet on Saturday and slept until mid Sunday. He has not taken it again.    Is there anything else we can give him for leg cramps which isn't sedating?    He works outside all the time. I told him if he needs us to fill forms for him--we would be happy to complete.      Message routed to dr bassett

## 2025-03-17 NOTE — TELEPHONE ENCOUNTER
----- Message from Alicia sent at 3/17/2025  9:51 AM CDT -----  Type: Needs Medical AdviceWho Called:  Porsha BAZZI case managerBest Call Back Number:[phone] Additional Information: states pt had chemo on Friday and is having leg pain. Meds are him making  sleepy during work. Also having problem with his nutrition. Plz reach out to PT

## 2025-03-18 ENCOUNTER — TELEPHONE (OUTPATIENT)
Dept: HEMATOLOGY/ONCOLOGY | Facility: CLINIC | Age: 45
End: 2025-03-18
Payer: COMMERCIAL

## 2025-03-18 NOTE — TELEPHONE ENCOUNTER
Impression    Mason Rodriguez's panel genetic testing was negative for actionable mutations in 39 genes associated with hereditary breast, gastrointestinal, gynecologic, pancreatic, prostate and skin cancers. Results were disclosed over the phone on 3/18/2025.    Discussion    Genetic Test Results    Mason Rodriguez had a sample submitted on 2/28/2025 to [facility] for xG panel testing. This panel includes sequencing and/or deletion/duplication analysis of the following 39 genes:    APC, WILL, AXIN2, BAP1, BARD1, BMPR1A, BRCA1, BRCA2, BRIP1, CDH1, CDK4, CDKN2A, CHEK2, EPCAM, FH, FLCN, GREM1, HOXB13, MBD4, MET, MLH1, MSH2, MSH3, MSH6, MUTYH, NF1, NTHL1, PALB2, PMS2, POLD1, POLE, PTEN, RAD51C, RAD51D, SMAD4, STK11, TP53, TSC1, TSC2, VHL     The results were negative for actionable mutations in any of these genes. This is considered a negative result, but it does not completely rule out a hereditary form of cancer in her family. Some individuals/families with cancer may have a mutation in a gene that is not included in this panel, and some may have mutations in one of these genes that is not detectable with our current technology. It could also be that his personal and family history of cancer is not due to a hereditary cause.     Given his negative results, the likelihood of a hereditary form of cancer has been drastically reduced for Mason and his family. He has undergone comprehensive hereditary cancer genetic testing, and no further genetic testing is recommended at this time.      Family Members    Brother and father recently had colonoscopies  Recommendation for children to start colonoscopies at 36yo    Mason Rodriguez received comprehensive genetic counseling regarding her negative genetic test results. Benefits and limitations were discussed, and he was provided with an electronic copy of his results report. He is encouraged to contact cancer genetics if there are any updates to her personal or family history, or  if he has any questions or concerns.    This assessment is based on the history and reports provided, as well as the current scientific knowledge regarding cancer genetics.

## 2025-03-28 DIAGNOSIS — C18.7 ADENOCARCINOMA OF SIGMOID COLON: ICD-10-CM

## 2025-03-28 RX ORDER — CAPECITABINE 500 MG/1
2000 TABLET, FILM COATED ORAL 2 TIMES DAILY
Qty: 112 TABLET | Refills: 0 | Status: ACTIVE | OUTPATIENT
Start: 2025-03-28 | End: 2026-03-28

## 2025-04-03 ENCOUNTER — OFFICE VISIT (OUTPATIENT)
Dept: HEMATOLOGY/ONCOLOGY | Facility: CLINIC | Age: 45
End: 2025-04-03
Payer: COMMERCIAL

## 2025-04-03 ENCOUNTER — LAB VISIT (OUTPATIENT)
Dept: LAB | Facility: HOSPITAL | Age: 45
End: 2025-04-03
Attending: INTERNAL MEDICINE
Payer: COMMERCIAL

## 2025-04-03 ENCOUNTER — PATIENT MESSAGE (OUTPATIENT)
Dept: ADMINISTRATIVE | Facility: OTHER | Age: 45
End: 2025-04-03
Payer: COMMERCIAL

## 2025-04-03 VITALS
OXYGEN SATURATION: 98 % | BODY MASS INDEX: 29.03 KG/M2 | SYSTOLIC BLOOD PRESSURE: 128 MMHG | HEIGHT: 75 IN | RESPIRATION RATE: 18 BRPM | HEART RATE: 78 BPM | DIASTOLIC BLOOD PRESSURE: 81 MMHG | TEMPERATURE: 98 F | WEIGHT: 233.44 LBS

## 2025-04-03 DIAGNOSIS — D84.821 IMMUNODEFICIENCY DUE TO CHEMOTHERAPY: ICD-10-CM

## 2025-04-03 DIAGNOSIS — T45.1X5A IMMUNODEFICIENCY DUE TO CHEMOTHERAPY: ICD-10-CM

## 2025-04-03 DIAGNOSIS — Z79.69 IMMUNODEFICIENCY DUE TO CHEMOTHERAPY: ICD-10-CM

## 2025-04-03 DIAGNOSIS — C18.7 ADENOCARCINOMA OF SIGMOID COLON: ICD-10-CM

## 2025-04-03 DIAGNOSIS — T45.1X5A CHEMOTHERAPY-INDUCED NEUROPATHY: ICD-10-CM

## 2025-04-03 DIAGNOSIS — G62.0 CHEMOTHERAPY-INDUCED NEUROPATHY: ICD-10-CM

## 2025-04-03 DIAGNOSIS — C18.7 ADENOCARCINOMA OF SIGMOID COLON: Primary | ICD-10-CM

## 2025-04-03 DIAGNOSIS — R39.11 URINARY HESITANCY: ICD-10-CM

## 2025-04-03 DIAGNOSIS — T45.1X5A CINV (CHEMOTHERAPY-INDUCED NAUSEA AND VOMITING): ICD-10-CM

## 2025-04-03 DIAGNOSIS — C77.9 SECONDARY ADENOCARCINOMA OF LYMPH NODE: ICD-10-CM

## 2025-04-03 DIAGNOSIS — R11.2 CINV (CHEMOTHERAPY-INDUCED NAUSEA AND VOMITING): ICD-10-CM

## 2025-04-03 LAB
ABSOLUTE EOSINOPHIL (OHS): 0.13 K/UL
ABSOLUTE MONOCYTE (OHS): 0.28 K/UL (ref 0.3–1)
ABSOLUTE NEUTROPHIL COUNT (OHS): 2.75 K/UL (ref 1.8–7.7)
ALBUMIN SERPL BCP-MCNC: 4 G/DL (ref 3.5–5.2)
ALP SERPL-CCNC: 59 UNIT/L (ref 40–150)
ALT SERPL W/O P-5'-P-CCNC: 33 UNIT/L (ref 10–44)
ANION GAP (OHS): 9 MMOL/L (ref 8–16)
AST SERPL-CCNC: 23 UNIT/L (ref 11–45)
BASOPHILS # BLD AUTO: 0.01 K/UL
BASOPHILS NFR BLD AUTO: 0.2 %
BILIRUB SERPL-MCNC: 0.3 MG/DL (ref 0.1–1)
BUN SERPL-MCNC: 14 MG/DL (ref 6–20)
CALCIUM SERPL-MCNC: 9 MG/DL (ref 8.7–10.5)
CHLORIDE SERPL-SCNC: 105 MMOL/L (ref 95–110)
CO2 SERPL-SCNC: 26 MMOL/L (ref 23–29)
CREAT SERPL-MCNC: 1.1 MG/DL (ref 0.5–1.4)
ERYTHROCYTE [DISTWIDTH] IN BLOOD BY AUTOMATED COUNT: 14.6 % (ref 11.5–14.5)
GFR SERPLBLD CREATININE-BSD FMLA CKD-EPI: >60 ML/MIN/1.73/M2
GLUCOSE SERPL-MCNC: 135 MG/DL (ref 70–110)
HCT VFR BLD AUTO: 42.2 % (ref 40–54)
HGB BLD-MCNC: 14.5 GM/DL (ref 14–18)
IMM GRANULOCYTES # BLD AUTO: 0.01 K/UL (ref 0–0.04)
IMM GRANULOCYTES NFR BLD AUTO: 0.2 % (ref 0–0.5)
LYMPHOCYTES # BLD AUTO: 1.74 K/UL (ref 1–4.8)
MCH RBC QN AUTO: 30.9 PG (ref 27–31)
MCHC RBC AUTO-ENTMCNC: 34.4 G/DL (ref 32–36)
MCV RBC AUTO: 90 FL (ref 82–98)
NUCLEATED RBC (/100WBC) (OHS): 0 /100 WBC
PLATELET # BLD AUTO: 258 K/UL (ref 150–450)
PMV BLD AUTO: 8.9 FL (ref 9.2–12.9)
POTASSIUM SERPL-SCNC: 4.4 MMOL/L (ref 3.5–5.1)
PROT SERPL-MCNC: 7.3 GM/DL (ref 6–8.4)
RBC # BLD AUTO: 4.69 M/UL (ref 4.6–6.2)
RELATIVE EOSINOPHIL (OHS): 2.6 %
RELATIVE LYMPHOCYTE (OHS): 35.4 % (ref 18–48)
RELATIVE MONOCYTE (OHS): 5.7 % (ref 4–15)
RELATIVE NEUTROPHIL (OHS): 55.9 % (ref 38–73)
SODIUM SERPL-SCNC: 140 MMOL/L (ref 136–145)
WBC # BLD AUTO: 4.92 K/UL (ref 3.9–12.7)

## 2025-04-03 PROCEDURE — 99215 OFFICE O/P EST HI 40 MIN: CPT | Mod: S$GLB,,, | Performed by: NURSE PRACTITIONER

## 2025-04-03 PROCEDURE — 36415 COLL VENOUS BLD VENIPUNCTURE: CPT | Mod: PN

## 2025-04-03 PROCEDURE — 1159F MED LIST DOCD IN RCRD: CPT | Mod: CPTII,S$GLB,, | Performed by: NURSE PRACTITIONER

## 2025-04-03 PROCEDURE — G2211 COMPLEX E/M VISIT ADD ON: HCPCS | Mod: S$GLB,,, | Performed by: NURSE PRACTITIONER

## 2025-04-03 PROCEDURE — 1160F RVW MEDS BY RX/DR IN RCRD: CPT | Mod: CPTII,S$GLB,, | Performed by: NURSE PRACTITIONER

## 2025-04-03 PROCEDURE — 3079F DIAST BP 80-89 MM HG: CPT | Mod: CPTII,S$GLB,, | Performed by: NURSE PRACTITIONER

## 2025-04-03 PROCEDURE — 85025 COMPLETE CBC W/AUTO DIFF WBC: CPT | Mod: PN

## 2025-04-03 PROCEDURE — 3074F SYST BP LT 130 MM HG: CPT | Mod: CPTII,S$GLB,, | Performed by: NURSE PRACTITIONER

## 2025-04-03 PROCEDURE — 84075 ASSAY ALKALINE PHOSPHATASE: CPT | Mod: PN

## 2025-04-03 PROCEDURE — 99999 PR PBB SHADOW E&M-EST. PATIENT-LVL IV: CPT | Mod: PBBFAC,,, | Performed by: NURSE PRACTITIONER

## 2025-04-03 PROCEDURE — 3008F BODY MASS INDEX DOCD: CPT | Mod: CPTII,S$GLB,, | Performed by: NURSE PRACTITIONER

## 2025-04-03 RX ORDER — HEPARIN 100 UNIT/ML
500 SYRINGE INTRAVENOUS
Status: CANCELLED | OUTPATIENT
Start: 2025-04-04

## 2025-04-03 RX ORDER — DIPHENHYDRAMINE HYDROCHLORIDE 50 MG/ML
50 INJECTION, SOLUTION INTRAMUSCULAR; INTRAVENOUS ONCE AS NEEDED
Status: CANCELLED | OUTPATIENT
Start: 2025-04-04

## 2025-04-03 RX ORDER — EPINEPHRINE 0.3 MG/.3ML
0.3 INJECTION SUBCUTANEOUS ONCE AS NEEDED
Status: CANCELLED | OUTPATIENT
Start: 2025-04-04

## 2025-04-03 RX ORDER — SODIUM CHLORIDE 0.9 % (FLUSH) 0.9 %
10 SYRINGE (ML) INJECTION
Status: CANCELLED | OUTPATIENT
Start: 2025-04-04

## 2025-04-03 RX ORDER — PROCHLORPERAZINE EDISYLATE 5 MG/ML
5 INJECTION INTRAMUSCULAR; INTRAVENOUS ONCE AS NEEDED
Status: CANCELLED
Start: 2025-04-04

## 2025-04-03 NOTE — PROGRESS NOTES
"Subjective     Patient ID: Mason Rodriguez is a 44 y.o. male.    Chief Complaint: Colon Cancer (Treatment clearance)      Oncology History (per record): Mr. Rodriguez is a 44-year-old male who underwent a screening colonoscopy on 11/26/2024 and was noted to have a 17 mm pedunculated polyp in the sigmoid colon, pathology revealed adenocarcinoma arising in a tubular adenoma.    On 01/28/2025 he underwent robotic sigmoid colectomy  Pathology revealed metastatic adenocarcinoma in the sigmoid colon in 1 of the 13  lymph nodes ( see addendum below) .   PROCEDURE: Sigmoidectomy   TUMOR SITE: Sigmoid colon   HISTOLOGIC TYPE: Adenocarcinoma arising in a tubular adenoma   HISTOLOGIC GRADE: Moderately differentiated   TUMOR SIZE: Polyp largely replaced by invasive moderately differentiated    adenocarcinoma   MULTIPLE PRIMARY SITES: Negative   TUMOR EXTENT: Extending through muscularis mucosa into submucosa   SUB-MUCOSAL INVASION: Minimal (polypectomy margin only)   LYMPHATIC AND/OR VASCULAR INVASION: Negative   PERINEURAL INVASION: Negative   TUMOR BUDDING SCORE: Negative   MARGIN STATUS FOR INVASIVE CARCINOMA: Negative   MARGIN STATUS FOR NON-INVASIVE TUMOR: Negative   DNA MISMATCH REPAIR STATUS (MMR): Pending on bishop metastasis.   REGIONAL LYMPH NODE STATUS:   NUMBER OF LYMPH NODES WITH TUMOR: At least one (1)   NUMBER OF LYMPH NODES EXAMINED: Two (2) - additional pending   TUMOR DEPOSITS: Pending   PATHOLOGIC STAGE CLASSIFICATION: pT1 pN pending, at least N1a   The addtional sections described below yield eleven additional lymph    nodes, all negative for tumor. The final node assessment is metastatic    carcinoma in one of thirteen lymph nodes (1/13) with the final bishop    stage pN1a.   Immunohistochemistry (IHC) Results for Mismatch Repair (MMR) Proteins WAYNE    Off note he had a PET scan on 12/20/2024 which revealed "Hypermetabolic nodule or node posterior to the right hilum with adjacent consolidation.  The overall " "appearance on CT is more suggestive of an infectious/inflammatory process than metastatic malignancy, but close follow-up will be necessary to rule out a malignant etiology.  Alternatively, further evaluation with EBUS could be pursued. Activity near the rectosigmoid junction may correspond to the site of malignant polyp resection with differential possibilities as outlined above.  No metastatic disease to lymph nodes or liver"     He underwent EBUS on 1/3/2025 and pathology revealed RIGHT LOWER LOBE LUNG NODULE FINE NEEDLE ASPIRATE. NEGATIVE FOR MALIGNANT CELLS. NON-CASEATING GRANULOMA/GIANT CELLS PRESENT"    Betty Rodriguez presents to clinic for clearance for adjuvant chemo with XELOX. He is here with his wife. Since the last visit, patient endorses some burning sensation to his hands and feet that lasted 7 days and has now resolved. He also had some nausea with one episode of vomiting that has resolved as well. He took anti-nausea medication with effectiveness. Patient states he is having some trouble with appetite as food is not tasting good. He is also noting that he is not finding interest in food like he did but does not want an appetite stimulant at this time.    Patient endorses some straining with urination that was present prior to his treatment but seems to have recently worsened.       He denies any nausea, vomiting, diarrhea, constipation, abdominal pain, weight loss or loss of appetite, chest pain, shortness of breath, leg swelling, fatigue, pain, headache, dizziness, or mood changes. His ECOG PS is zero. He is here with his wife      Review of Systems   Constitutional:  Positive for appetite change (taste change). Negative for fatigue and unexpected weight change.   HENT:  Negative for mouth sores.    Eyes:  Negative for visual disturbance.   Respiratory:  Negative for cough and shortness of breath.    Cardiovascular:  Negative for chest pain.   Gastrointestinal:  Negative for abdominal pain and " diarrhea.   Genitourinary:  Positive for difficulty urinating. Negative for frequency.   Musculoskeletal:  Negative for back pain.   Integumentary:  Negative for rash.   Neurological:  Negative for headaches.   Hematological:  Negative for adenopathy.   Psychiatric/Behavioral:  The patient is not nervous/anxious.    All other systems reviewed and are negative.         Objective     Physical Exam  Vitals reviewed.   Constitutional:       General: He is not in acute distress.     Appearance: He is well-developed. He is not diaphoretic.   HENT:      Mouth/Throat:      Pharynx: No oropharyngeal exudate.   Cardiovascular:      Rate and Rhythm: Normal rate.      Heart sounds: Normal heart sounds.   Pulmonary:      Effort: Pulmonary effort is normal.      Breath sounds: Normal breath sounds. No wheezing.   Chest:      Comments: Left chest PORT+  Abdominal:      General: Bowel sounds are normal.      Palpations: Abdomen is soft.      Tenderness: There is no abdominal tenderness.   Musculoskeletal:         General: No tenderness.      Right lower leg: No edema.      Left lower leg: No edema.   Lymphadenopathy:      Cervical: No cervical adenopathy.   Skin:     General: Skin is warm and dry.      Findings: No rash.   Neurological:      Mental Status: He is alert and oriented to person, place, and time.      Coordination: Coordination normal.   Psychiatric:         Thought Content: Thought content normal.         Judgment: Judgment normal.           LABS:  WBC   Date Value Ref Range Status   04/03/2025 4.92 3.90 - 12.70 K/uL Final     Hemoglobin   Date Value Ref Range Status   03/13/2025 14.3 14.0 - 18.0 g/dL Final     HGB   Date Value Ref Range Status   04/03/2025 14.5 14.0 - 18.0 gm/dL Final     Hematocrit   Date Value Ref Range Status   03/13/2025 42.6 40.0 - 54.0 % Final     HCT   Date Value Ref Range Status   04/03/2025 42.2 40.0 - 54.0 % Final     Platelet Count   Date Value Ref Range Status   04/03/2025 258 150 - 450  K/uL Final     Platelets   Date Value Ref Range Status   03/13/2025 248 150 - 450 K/uL Final     Gran # (ANC)   Date Value Ref Range Status   03/13/2025 4.2 1.8 - 7.7 K/uL Final     Gran %   Date Value Ref Range Status   03/13/2025 61.8 38.0 - 73.0 % Final       Chemistry        Component Value Date/Time     04/03/2025 1013     03/13/2025 0756    K 4.4 04/03/2025 1013    K 4.5 03/13/2025 0756     04/03/2025 1013     03/13/2025 0756    CO2 26 04/03/2025 1013    CO2 25 03/13/2025 0756    BUN 14 04/03/2025 1013    CREATININE 1.1 04/03/2025 1013     03/13/2025 0756        Component Value Date/Time    CALCIUM 9.0 04/03/2025 1013    CALCIUM 8.7 03/13/2025 0756    ALKPHOS 59 04/03/2025 1013    ALKPHOS 49 03/13/2025 0756    AST 23 04/03/2025 1013    AST 23 03/13/2025 0756    ALT 33 04/03/2025 1013    ALT 35 03/13/2025 0756    BILITOT 0.3 04/03/2025 1013    BILITOT 0.3 03/13/2025 0756    ESTGFRAFRICA >60 08/13/2020 0849    EGFRNONAA >60 08/13/2020 0849        Vitals:    04/03/25 1041   BP: 128/81   Pulse: 78   Resp: 18   Temp: 97.6 °F (36.4 °C)     Wt Readings from Last 3 Encounters:   04/03/25 105.9 kg (233 lb 7.5 oz)   03/14/25 106.5 kg (234 lb 12.6 oz)   03/13/25 109.3 kg (240 lb 15.4 oz)        Assessment and Plan     1. Adenocarcinoma of sigmoid colon    2. Secondary adenocarcinoma of lymph node    3. Immunodeficiency due to chemotherapy    4. Urinary hesitancy  -     PSA; Future; Expected date: 04/03/2025    5. CINV (chemotherapy-induced nausea and vomiting)    6. Chemotherapy-induced neuropathy    Other orders  -     palonosetron 0.25mg/dexAMETHasone 12mg in NS IVPB 0.25 mg 50 mL  -     oxaliplatin (ELOXATIN) 130 mg/m2 = 308 mg in D5W 561.6 mL chemo infusion  -     prochlorperazine injection Soln 5 mg  -     EPINEPHrine (EPIPEN) 0.3 mg/0.3 mL pen injection 0.3 mg  -     diphenhydrAMINE injection 50 mg  -     hydrocortisone sodium succinate injection 100 mg  -     D5W 250 mL flush bag  -      sodium chloride 0.9% flush 10 mL  -     heparin, porcine (PF) 100 unit/mL injection flush 500 Units  -     alteplase injection 2 mg          Route Chart for Scheduling    Med Onc Chart Routing      Follow up with physician . As scheduled for PSA, CBC, CMP on 4/24/25 prior to seeing Dr. Lerner on 4/24/25, treatment scheduled for 4/25/25   Follow up with YOSEPH    Infusion scheduling note    Injection scheduling note    Labs    Imaging    Pharmacy appointment    Other referrals                  Treatment Plan Information   OP COLORECTAL CAPECITABINE OXALIPLATIN Edel Lerner MD   Associated diagnosis: Secondary adenocarcinoma of lymph node   noted on 2/11/2025  Associated diagnosis: Adenocarcinoma of sigmoid colon Stage IIIA pT2, pN1, cM0 noted on 1/2/2025   Line of treatment: Adjuvant  Treatment Goal: Curative     Upcoming Treatment Dates - OP COLORECTAL CAPECITABINE OXALIPLATIN    4/4/2025       Pre-Medications       palonosetron 0.25mg/dexAMETHasone 12mg in NS IVPB 0.25 mg 50 mL       Chemotherapy       oxaliplatin (ELOXATIN) 130 mg/m2 = 308 mg in D5W 561.6 mL chemo infusion  4/25/2025       Pre-Medications       palonosetron 0.25mg/dexAMETHasone 12mg in NS IVPB 0.25 mg 50 mL       Chemotherapy       oxaliplatin (ELOXATIN) 130 mg/m2 = 308 mg in D5W 561.6 mL chemo infusion  5/16/2025       Pre-Medications       palonosetron 0.25mg/dexAMETHasone 12mg in NS IVPB 0.25 mg 50 mL       Chemotherapy       oxaliplatin (ELOXATIN) 130 mg/m2 = 308 mg in D5W 561.6 mL chemo infusion           Mr. Rodriguez is doing well clinically and will proceed with adjuvant chemotherapy with XELOX and will return in 3 weeks for next cycle    Immunodeficiency due to chemo - at risk for infection  -no s/s of infection  -Will monitor    Chemo induced neuropathy  -Resolved now  -Will monitor    Difficulty urinating (hesitancy)  -Present before treatment but has recently worsened  -Will draw a PSA with next labs    CINV - resolved with compazine and  phenergan  -Well controlled    Visit today included increased complexity associated with the care of the episodic problem adjuvant chemo addressed and managing the longitudinal care of the patient due to the serious and/or complex managed problem(s) colon cancer    Above care plan was discussed with patient and accompanying wife and all questions were addressed to their satisfaction     DIVYA Horvath  Hematology and Medical Oncology  [facility]  [facility]    45 minutes of total time spent on the encounter, which includes face to face time and non-face to face time preparing to see the patient (eg, review of tests), Obtaining and/or reviewing separately obtained history, documenting clinical information in the electronic or other health record, independently interpreting results if documented above (not separately reported) and communicating results to the patient/family/caregiver, or Care coordination (not separately reported).

## 2025-04-04 ENCOUNTER — INFUSION (OUTPATIENT)
Dept: INFUSION THERAPY | Facility: HOSPITAL | Age: 45
End: 2025-04-04
Attending: INTERNAL MEDICINE
Payer: COMMERCIAL

## 2025-04-04 ENCOUNTER — DOCUMENTATION ONLY (OUTPATIENT)
Dept: INFUSION THERAPY | Facility: HOSPITAL | Age: 45
End: 2025-04-04

## 2025-04-04 ENCOUNTER — DOCUMENTATION ONLY (OUTPATIENT)
Dept: INFUSION THERAPY | Facility: HOSPITAL | Age: 45
End: 2025-04-04
Payer: COMMERCIAL

## 2025-04-04 VITALS
RESPIRATION RATE: 19 BRPM | SYSTOLIC BLOOD PRESSURE: 134 MMHG | HEART RATE: 78 BPM | WEIGHT: 232.56 LBS | TEMPERATURE: 98 F | BODY MASS INDEX: 28.92 KG/M2 | OXYGEN SATURATION: 98 % | DIASTOLIC BLOOD PRESSURE: 88 MMHG | HEIGHT: 75 IN

## 2025-04-04 DIAGNOSIS — C77.9 SECONDARY ADENOCARCINOMA OF LYMPH NODE: Primary | ICD-10-CM

## 2025-04-04 DIAGNOSIS — C18.7 ADENOCARCINOMA OF SIGMOID COLON: ICD-10-CM

## 2025-04-04 PROCEDURE — 96367 TX/PROPH/DG ADDL SEQ IV INF: CPT | Mod: PN

## 2025-04-04 PROCEDURE — A4216 STERILE WATER/SALINE, 10 ML: HCPCS | Mod: PN | Performed by: NURSE PRACTITIONER

## 2025-04-04 PROCEDURE — 63600175 PHARM REV CODE 636 W HCPCS: Mod: PN | Performed by: NURSE PRACTITIONER

## 2025-04-04 PROCEDURE — 96413 CHEMO IV INFUSION 1 HR: CPT | Mod: PN

## 2025-04-04 PROCEDURE — 25000003 PHARM REV CODE 250: Mod: PN | Performed by: NURSE PRACTITIONER

## 2025-04-04 PROCEDURE — 96415 CHEMO IV INFUSION ADDL HR: CPT | Mod: PN

## 2025-04-04 RX ORDER — DIPHENHYDRAMINE HYDROCHLORIDE 50 MG/ML
50 INJECTION, SOLUTION INTRAMUSCULAR; INTRAVENOUS ONCE AS NEEDED
Status: DISCONTINUED | OUTPATIENT
Start: 2025-04-04 | End: 2025-04-04 | Stop reason: HOSPADM

## 2025-04-04 RX ORDER — PROCHLORPERAZINE EDISYLATE 5 MG/ML
5 INJECTION INTRAMUSCULAR; INTRAVENOUS ONCE AS NEEDED
Status: DISCONTINUED | OUTPATIENT
Start: 2025-04-04 | End: 2025-04-04 | Stop reason: HOSPADM

## 2025-04-04 RX ORDER — SODIUM CHLORIDE 0.9 % (FLUSH) 0.9 %
10 SYRINGE (ML) INJECTION
Status: DISCONTINUED | OUTPATIENT
Start: 2025-04-04 | End: 2025-04-04 | Stop reason: HOSPADM

## 2025-04-04 RX ORDER — EPINEPHRINE 0.3 MG/.3ML
0.3 INJECTION SUBCUTANEOUS ONCE AS NEEDED
Status: DISCONTINUED | OUTPATIENT
Start: 2025-04-04 | End: 2025-04-04 | Stop reason: HOSPADM

## 2025-04-04 RX ORDER — HEPARIN 100 UNIT/ML
500 SYRINGE INTRAVENOUS
Status: DISCONTINUED | OUTPATIENT
Start: 2025-04-04 | End: 2025-04-04 | Stop reason: HOSPADM

## 2025-04-04 RX ADMIN — OXALIPLATIN 300 MG: 5 INJECTION, SOLUTION INTRAVENOUS at 09:04

## 2025-04-04 RX ADMIN — DEXTROSE MONOHYDRATE: 5 INJECTION, SOLUTION INTRAVENOUS at 09:04

## 2025-04-04 RX ADMIN — Medication 10 ML: at 11:04

## 2025-04-04 RX ADMIN — DEXAMETHASONE SODIUM PHOSPHATE 0.25 MG: 10 INJECTION, SOLUTION INTRAMUSCULAR; INTRAVENOUS at 09:04

## 2025-04-04 NOTE — PROGRESS NOTES
LCSW met with patient at the chairside during infusion. SW gave Mr. Rodriguez a reminder for [employer]. Mr. Rodriguez expressed his gratitude. Patient denied any emotional or practical needs at this time.  encouraged the patient to reach out if any needs arise.

## 2025-04-04 NOTE — PROGRESS NOTES
ONCOLOGY NUTRITION   FOLLOW UP VISIT        Mason Rodriguez is a 44 y.o. male.  DATE: 04/04/2025        Oncology Diagnosis: Colon cancer     REFERRAL FROM:   [] Integrative Oncology   [] Med/Heme Oncology  [] Radiation Oncology  [] Surgical Oncology   [] Infusion Nurse    [x] Routine Nutrition follow up    TREATMENT PLAN:   [] Full treatment plan pending  [x] Chemotherapy  [] Immunotherapy  [] Radiation  [] Concurrent  [] Surgery  [] Treatment complete/post-treatment    ANTHROPOMETRICS:  Wt Readings from Last 10 Encounters:   04/04/25 105.5 kg (232 lb 9.4 oz)   04/03/25 105.9 kg (233 lb 7.5 oz)   03/14/25 106.5 kg (234 lb 12.6 oz)   03/13/25 109.3 kg (240 lb 15.4 oz)   02/21/25 106.8 kg (235 lb 7.2 oz)   02/20/25 106.7 kg (235 lb 3.7 oz)   02/19/25 108 kg (238 lb 3.3 oz)   02/12/25 107.3 kg (236 lb 8.9 oz)   02/11/25 107.3 kg (236 lb 8.9 oz)   01/28/25 108.9 kg (240 lb 1.3 oz)      Weight Changes:  Pt with an 8# or 3.3% weight loss in 2 months - not considered significant     PHYSICAL EXAM:  Muscle Wasting Observed:  [x] No Deficit   [] Mild Deficit   [] Moderate   [] Severe    INTAKE:  [x] PO Intake [] TF Intake  Current Diet: regular diet  Dietary Patterns:  Eating meals/snacks as tolerated  [] Oral nutritional supplements:     SYMPTOMS/COMPLAINTS:   [] No nutritional concerns at current  [] Diarrhea                    [] Constipation           [x] Nausea                 [] Vomiting                [] Indigestion                [] Reflux              [x] Poor Appetite            [] Anorexia                 [] Early Satiety         [] Gas                       [] Bloating                     [] Dry Mouth    [] Mucositis                   [] Mouth Sores           [] Poor Dentition      [] Difficulty chewing  [] Difficulty Swallowing   [] Pain with swallowing [x] Change in taste      [] Change in smell   [] Pain (general)       [] Fatigue                      [] Sleep issues    [] Weight loss  [] other, please  specify-     Nutrition Re-Assessment Risk: Low to moderate d/t side effects    [x] Labs reviewed   [x] Meds reviewed    Education Provided:   [] No Education Needed at this time  [] Diarrhea                                              [] Constipation                          [x] Nausea/Vomiting  [] Mucositis                [] Dry Mouth    [x] Dealing with changes in Taste/Smell  [x] Dealing with Poor Appetite   [] Soft/moist Diet      [] Weight Loss/Gain     [] Weight Maintenance                           [] Indigestion/GERD                 [] Gas/Bloating          [] Foods High/ Low in specific nutrients [] Increasing Calories/Protein   [] Milkshake/Smoothies Recipes   [] Nutrition Supplements                        [] Increasing Fluid Intakes         [] Foods that fight cancer    [] Evidence bases resources                 [] Fermented Foods/Probiotics  [] Mediterranean/Plant Based Diet     [] Other, specify                                   [x] Handouts provided: Poor Appetite & Taste and Smell Changes      [x] Samples provided: LMNT electrolyte powder    RD NOTE:  RD met with pt at chairside during infusion tx. Pt present for cycle 2 of CapOx. Pt experiencing nausea, taste changes, and poor appetite. Pt taking antiemetics for nausea. Pt with a weight loss of 8# in 2 months - not considered significant.   RD discussed tips to help with side effects from CapOx. RD answered all questions to pt's satisfaction.       RD Goals:   [x] Weight stable                  [] Weight gain                      [] Weight Loss                               [] Continue adequate Kcal/protein   [] Increase Kcal/protein      [] Adjust Tube-feeding Rx   [] Tolerate Tube Feedings             [] Increase tube feedings to goal     [] Tolerate Supplements     [x] Symptom Improvement   [] Understand nutrition Education  [x] Offer supportive visits   [] other, please specify    RECOMMENDATIONS:  Eat smaller, more frequent meals/snacks  Use  lemon and/or baking soda/salt mouth wash for change of taste  Drink 1/2 packet LMNT electrolytes daily    Follow up: PRN throughout tx    Christine Kaufman, DARIA, LDN  04/04/2025  2:21 PM

## 2025-04-04 NOTE — PLAN OF CARE
Problem: Adult Inpatient Plan of Care  Goal: Plan of Care Review  Outcome: Progressing  Flowsheets (Taken 4/4/2025 1159)  Plan of Care Reviewed With: patient  Goal: Patient-Specific Goal (Individualized)  Outcome: Progressing  Flowsheets (Taken 4/4/2025 1159)  Individualized Care Needs: recliner, blanket, education, wife at chairside  Anxieties, Fears or Concerns: none     Problem: Fatigue  Goal: Improved Activity Tolerance  Outcome: Progressing  Intervention: Promote Improved Energy  Flowsheets (Taken 4/4/2025 1159)  Fatigue Management: paced activity encouraged  Sleep/Rest Enhancement: natural light exposure provided  Activity Management:   Ambulated -L4   Up in chair - L3  Environmental Support:   environmental consistency promoted   distractions minimized  Pt arrived to clinic for C42D1 treatment with Oxaliplatin infusion. VS stable, no adverse reactions noted. Pt c/o muscle spasms to BLE. MD aware per pt and muscle relaxer prescribed. Charge RN notified. Pt educated on cold precautions and other side effects of meds and aware of f/u appts and discharged to home in NAD.

## 2025-04-09 ENCOUNTER — PATIENT MESSAGE (OUTPATIENT)
Dept: HEMATOLOGY/ONCOLOGY | Facility: CLINIC | Age: 45
End: 2025-04-09
Payer: COMMERCIAL

## 2025-04-09 DIAGNOSIS — G62.9 NEUROPATHY: Primary | ICD-10-CM

## 2025-04-11 RX ORDER — GABAPENTIN 300 MG/1
300 CAPSULE ORAL NIGHTLY
Qty: 30 CAPSULE | Refills: 3 | Status: SHIPPED | OUTPATIENT
Start: 2025-04-11 | End: 2026-04-11

## 2025-04-11 NOTE — TELEPHONE ENCOUNTER
Find out how long the pain sensation is lasting .  If it is persistent route him gabapentin 300 mg to take at night

## 2025-04-15 ENCOUNTER — TELEPHONE (OUTPATIENT)
Dept: PHARMACY | Facility: CLINIC | Age: 45
End: 2025-04-15
Payer: COMMERCIAL

## 2025-04-15 DIAGNOSIS — C18.7 ADENOCARCINOMA OF SIGMOID COLON: ICD-10-CM

## 2025-04-15 RX ORDER — CAPECITABINE 500 MG/1
2000 TABLET, FILM COATED ORAL 2 TIMES DAILY
Qty: 112 TABLET | Refills: 0 | Status: ACTIVE | OUTPATIENT
Start: 2025-04-15 | End: 2026-04-15

## 2025-04-15 NOTE — TELEPHONE ENCOUNTER
[facility]/Population Health Chart Review & Patient Outreach Details For Medication Adherence Project    Reason for Outreach Encounter: 3rd Party payor non-compliance report ([employer], [employer], [employer], etc)  2.  Patient Outreach Method: Reviewed patient chart   3.   Medication in question:  Enalapril          4.  Reviewed and or Updates Made To: Patient Chart  5. Outreach Outcomes and/or actions taken: Medication discontinued  Additional Notes:

## 2025-04-21 ENCOUNTER — NURSE TRIAGE (OUTPATIENT)
Dept: ADMINISTRATIVE | Facility: CLINIC | Age: 45
End: 2025-04-21
Payer: COMMERCIAL

## 2025-04-22 ENCOUNTER — TELEPHONE (OUTPATIENT)
Dept: HEMATOLOGY/ONCOLOGY | Facility: CLINIC | Age: 45
End: 2025-04-22
Payer: COMMERCIAL

## 2025-04-22 ENCOUNTER — LAB VISIT (OUTPATIENT)
Dept: LAB | Facility: HOSPITAL | Age: 45
End: 2025-04-22
Attending: INTERNAL MEDICINE
Payer: COMMERCIAL

## 2025-04-22 ENCOUNTER — OFFICE VISIT (OUTPATIENT)
Dept: HEMATOLOGY/ONCOLOGY | Facility: CLINIC | Age: 45
End: 2025-04-22
Payer: COMMERCIAL

## 2025-04-22 DIAGNOSIS — N48.1 BALANITIS: ICD-10-CM

## 2025-04-22 DIAGNOSIS — C18.7 ADENOCARCINOMA OF SIGMOID COLON: ICD-10-CM

## 2025-04-22 DIAGNOSIS — K52.9 COLITIS: Primary | ICD-10-CM

## 2025-04-22 LAB
ABSOLUTE EOSINOPHIL (OHS): 0.29 K/UL
ABSOLUTE MONOCYTE (OHS): 0.77 K/UL (ref 0.3–1)
ABSOLUTE NEUTROPHIL COUNT (OHS): 3.23 K/UL (ref 1.8–7.7)
ALBUMIN SERPL BCP-MCNC: 3.6 G/DL (ref 3.5–5.2)
ALP SERPL-CCNC: 46 UNIT/L (ref 40–150)
ALT SERPL W/O P-5'-P-CCNC: 32 UNIT/L (ref 10–44)
ANION GAP (OHS): 8 MMOL/L (ref 8–16)
AST SERPL-CCNC: 30 UNIT/L (ref 11–45)
BASOPHILS # BLD AUTO: 0.03 K/UL
BASOPHILS NFR BLD AUTO: 0.5 %
BILIRUB SERPL-MCNC: 0.4 MG/DL (ref 0.1–1)
BUN SERPL-MCNC: 11 MG/DL (ref 6–20)
CALCIUM SERPL-MCNC: 8.7 MG/DL (ref 8.7–10.5)
CHLORIDE SERPL-SCNC: 109 MMOL/L (ref 95–110)
CO2 SERPL-SCNC: 25 MMOL/L (ref 23–29)
CREAT SERPL-MCNC: 1.2 MG/DL (ref 0.5–1.4)
ERYTHROCYTE [DISTWIDTH] IN BLOOD BY AUTOMATED COUNT: 16.5 % (ref 11.5–14.5)
GFR SERPLBLD CREATININE-BSD FMLA CKD-EPI: >60 ML/MIN/1.73/M2
GLUCOSE SERPL-MCNC: 85 MG/DL (ref 70–110)
HCT VFR BLD AUTO: 38.8 % (ref 40–54)
HGB BLD-MCNC: 13.5 GM/DL (ref 14–18)
IMM GRANULOCYTES # BLD AUTO: 0.02 K/UL (ref 0–0.04)
IMM GRANULOCYTES NFR BLD AUTO: 0.3 % (ref 0–0.5)
LYMPHOCYTES # BLD AUTO: 2.01 K/UL (ref 1–4.8)
MCH RBC QN AUTO: 31.5 PG (ref 27–31)
MCHC RBC AUTO-ENTMCNC: 34.8 G/DL (ref 32–36)
MCV RBC AUTO: 90 FL (ref 82–98)
NUCLEATED RBC (/100WBC) (OHS): 0 /100 WBC
PLATELET # BLD AUTO: 197 K/UL (ref 150–450)
PMV BLD AUTO: 8.8 FL (ref 9.2–12.9)
POTASSIUM SERPL-SCNC: 3.9 MMOL/L (ref 3.5–5.1)
PROT SERPL-MCNC: 7 GM/DL (ref 6–8.4)
RBC # BLD AUTO: 4.29 M/UL (ref 4.6–6.2)
RELATIVE EOSINOPHIL (OHS): 4.6 %
RELATIVE LYMPHOCYTE (OHS): 31.7 % (ref 18–48)
RELATIVE MONOCYTE (OHS): 12.1 % (ref 4–15)
RELATIVE NEUTROPHIL (OHS): 50.8 % (ref 38–73)
SODIUM SERPL-SCNC: 142 MMOL/L (ref 136–145)
WBC # BLD AUTO: 6.35 K/UL (ref 3.9–12.7)

## 2025-04-22 PROCEDURE — 36415 COLL VENOUS BLD VENIPUNCTURE: CPT | Mod: PN

## 2025-04-22 PROCEDURE — 85025 COMPLETE CBC W/AUTO DIFF WBC: CPT | Mod: PN

## 2025-04-22 PROCEDURE — 80053 COMPREHEN METABOLIC PANEL: CPT | Mod: PN

## 2025-04-22 RX ORDER — CIPROFLOXACIN 500 MG/1
500 TABLET ORAL 2 TIMES DAILY
Qty: 20 TABLET | Refills: 0 | Status: SHIPPED | OUTPATIENT
Start: 2025-04-22 | End: 2025-05-02

## 2025-04-22 RX ORDER — CLOTRIMAZOLE AND BETAMETHASONE DIPROPIONATE 10; .64 MG/G; MG/G
CREAM TOPICAL 2 TIMES DAILY
Qty: 45 G | Refills: 3 | Status: SHIPPED | OUTPATIENT
Start: 2025-04-22

## 2025-04-22 RX ORDER — DIPHENOXYLATE HYDROCHLORIDE AND ATROPINE SULFATE 2.5; .025 MG/1; MG/1
1 TABLET ORAL 4 TIMES DAILY PRN
Qty: 40 TABLET | Refills: 0 | Status: SHIPPED | OUTPATIENT
Start: 2025-04-22 | End: 2025-05-02

## 2025-04-22 RX ORDER — METRONIDAZOLE 500 MG/1
500 TABLET ORAL EVERY 8 HOURS
Qty: 30 TABLET | Refills: 0 | Status: SHIPPED | OUTPATIENT
Start: 2025-04-22 | End: 2025-05-02

## 2025-04-22 NOTE — TELEPHONE ENCOUNTER
Spoke with patient.  He started having loose stools on Saturday---average 2 per hour. No fever/no blood in stool.  Last xeloda was last week on the 17th.

## 2025-04-22 NOTE — Clinical Note
Gordy Go. I saw this alondra virtual today for diarrhea, He reported pain and burning at his penis, of course I took a look on virtual and the tip/glans area is red, and he says it is painful,.  Could this be balanitis, I have never seen one but it sounds like it?

## 2025-04-22 NOTE — PROGRESS NOTES
"Subjective     Patient ID: Mason Rodriguez is a 44 y.o. male.    Chief Complaint: No chief complaint on file.  Mr. Rodriguez is a 44-year-old male who underwent a screening colonoscopy on 11/26/2024 and was noted to have a 17 mm pedunculated polyp in the sigmoid colon, pathology revealed adenocarcinoma arising in a tubular adenoma.    On 01/28/2025 he underwent robotic sigmoid colectomy  Pathology revealed metastatic adenocarcinoma in the sigmoid colon in 1 of the 13  lymph nodes ( see addendum below) .   PROCEDURE: Sigmoidectomy   TUMOR SITE: Sigmoid colon   HISTOLOGIC TYPE: Adenocarcinoma arising in a tubular adenoma   HISTOLOGIC GRADE: Moderately differentiated   TUMOR SIZE: Polyp largely replaced by invasive moderately differentiated    adenocarcinoma   MULTIPLE PRIMARY SITES: Negative   TUMOR EXTENT: Extending through muscularis mucosa into submucosa   SUB-MUCOSAL INVASION: Minimal (polypectomy margin only)   LYMPHATIC AND/OR VASCULAR INVASION: Negative   PERINEURAL INVASION: Negative   TUMOR BUDDING SCORE: Negative   MARGIN STATUS FOR INVASIVE CARCINOMA: Negative   MARGIN STATUS FOR NON-INVASIVE TUMOR: Negative   DNA MISMATCH REPAIR STATUS (MMR): Pending on bishop metastasis.   REGIONAL LYMPH NODE STATUS:   NUMBER OF LYMPH NODES WITH TUMOR: At least one (1)   NUMBER OF LYMPH NODES EXAMINED: Two (2) - additional pending   TUMOR DEPOSITS: Pending   PATHOLOGIC STAGE CLASSIFICATION: pT1 pN pending, at least N1a   The addtional sections described below yield eleven additional lymph    nodes, all negative for tumor. The final node assessment is metastatic    carcinoma in one of thirteen lymph nodes (1/13) with the final bishop    stage pN1a.   Immunohistochemistry (IHC) Results for Mismatch Repair (MMR) Proteins WAYNE    Off note he had a PET scan on 12/20/2024 which revealed "Hypermetabolic nodule or node posterior to the right hilum with adjacent consolidation.  The overall appearance on CT is more suggestive of an " "infectious/inflammatory process than metastatic malignancy, but close follow-up will be necessary to rule out a malignant etiology.  Alternatively, further evaluation with EBUS could be pursued. Activity near the rectosigmoid junction may correspond to the site of malignant polyp resection with differential possibilities as outlined above.  No metastatic disease to lymph nodes or liver"     He underwent EBUS on 1/3/2025 and pathology revealed RIGHT LOWER LOBE LUNG NODULE FINE NEEDLE ASPIRATE. NEGATIVE FOR MALIGNANT CELLS. NON-CASEATING GRANULOMA/GIANT CELLS PRESENT"     He started  adjuvant chemo with XELOX on 3/13/2205 and cycle 2 was on 4/3/2025       HPIhe is being seen as a UC visit. He started with diarrhea multiple episodes at least 2/hour, he has abdomen pain right before his diarrhea  Symptoms continue. He vomited X 1, no fever    CT chest, abdomen/pelvis There are surgical sutures in the proximal sigmoid colon with some surrounding pericolonic inflammation and mild wall thickening of a portion of the mid sigmoid colon for instance on image 73 series 2. This could reflect an element of mild sigmoid colitis. Correlate with clinical and laboratory findings as regards additional evaluation and follow-up. 2. Details and other findings as discussed abov     He also notes pain and burning at the tip of his penis X 1 week   Review of Systems   Constitutional:  Negative for appetite change and unexpected weight change.   HENT:  Negative for mouth sores.    Eyes:  Negative for visual disturbance.   Respiratory:  Negative for cough and shortness of breath.    Cardiovascular:  Negative for chest pain.   Gastrointestinal:  Positive for abdominal pain and diarrhea.   Genitourinary:  Negative for frequency.   Musculoskeletal:  Negative for back pain.   Integumentary:  Positive for rash.   Neurological:  Negative for headaches.   Hematological:  Negative for adenopathy.   Psychiatric/Behavioral:  The patient is " nervous/anxious.           Objective     Physical Exam     PENIS: glans appears red, he notes pain and tenderness   Assessment and Plan     1. Colitis  -     ciprofloxacin HCl (CIPRO) 500 MG tablet; Take 1 tablet (500 mg total) by mouth 2 (two) times daily. for 10 days  Dispense: 20 tablet; Refill: 0  -     metroNIDAZOLE (FLAGYL) 500 MG tablet; Take 1 tablet (500 mg total) by mouth every 8 (eight) hours. for 10 days  Dispense: 30 tablet; Refill: 0  -     diphenoxylate-atropine 2.5-0.025 mg (LOMOTIL) 2.5-0.025 mg per tablet; Take 1 tablet by mouth 4 (four) times daily as needed for Diarrhea.  Dispense: 40 tablet; Refill: 0    2. Balanitis  -     Ambulatory referral/consult to Urology; Future; Expected date: 04/29/2025      Route Chart for Scheduling    Med Onc Chart Routing      Follow up with physician . Cancel visit with me and chemo this week.  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   noted on 2/11/2025  Associated diagnosis: Adenocarcinoma of sigmoid colon Stage IIIA pT2, pN1, cM0 noted on 1/2/2025   Line of treatment: Adjuvant  Treatment Goal: Curative     Upcoming Treatment Dates - OP COLORECTAL CAPECITABINE OXALIPLATIN    4/25/2025       Pre-Medications       palonosetron 0.25mg/dexAMETHasone 12mg in NS IVPB 0.25 mg 50 mL       Chemotherapy       oxaliplatin (ELOXATIN) 130 mg/m2 = 308 mg in D5W 561.6 mL chemo infusion  5/16/2025       Pre-Medications       palonosetron 0.25mg/dexAMETHasone 12mg in NS IVPB 0.25 mg 50 mL       Chemotherapy       oxaliplatin (ELOXATIN) 130 mg/m2 = 308 mg in D5W 561.6 mL chemo infusion    Mr. Rodriguez is being seen after his recent ER visit for diarrhea most likely chemo, CT  scans from the ER revealed mild sigmoid colitis  Since he is having extensive diarrhea and stool studies otherwise negative E scribed Cipro and Flagyl as well as Lomotil.  He will let me know if he does not improve  He also noted some redness and swelling of his penis, I was able to see it virtually.  Discuss briefly with Urology and they recommend Lotrisone and if it does not resolve then refer to dermatology    So we will cancel urology appointment for now    We will hold chemo this week and rescheduled to next week    Visit today included increased complexity associated with the care of the episodic problem chemotherapy addressed and managing the longitudinal care of the patient due to the serious and/or complex managed problem(s) rectal cancer    Above plan reviewed with the patient and all questions were answered to his satisfaction

## 2025-04-22 NOTE — TELEPHONE ENCOUNTER
Pt is currently receiving Chemotherapy. Pt is calling and states that he is having severe Diarrhea and going on since Saturday. Pt states that he is taking Imodium and it is not helping at all. Some abdominal pain but pt states that he would not say that it is severe. Dispo- Go to ED now. Pt states that he would like to wait and talk to his doctor tomorrow. Pt advised that the Care advise is to go to the ED. Pt VU. Advised to call back with any further questions or concerns.         Reason for Disposition   [1] Neutropenia known or suspected (e.g., recent cancer chemotherapy) AND [2] new-onset of diarrhea    Additional Information   Negative: Shock suspected (e.g., cold/pale/clammy skin, too weak to stand, low BP, rapid pulse)   Negative: Difficult to awaken or acting confused (e.g., disoriented, slurred speech)   Negative: Sounds like a life-threatening emergency to the triager   Negative: [1] SEVERE abdominal pain (e.g., excruciating) AND [2] present > 1 hour   Negative: [1] SEVERE abdominal pain AND [2] age > 60 years   Negative: [1] Blood in the stool AND [2] moderate or large amount of blood   Negative: Black or tarry bowel movements  (Exception: Chronic-unchanged black-grey BMs AND is taking iron pills or Pepto-Bismol.)    Protocols used: Cancer - Diarrhea-A-

## 2025-04-29 ENCOUNTER — TELEPHONE (OUTPATIENT)
Dept: HEMATOLOGY/ONCOLOGY | Facility: CLINIC | Age: 45
End: 2025-04-29

## 2025-04-29 ENCOUNTER — OFFICE VISIT (OUTPATIENT)
Dept: HEMATOLOGY/ONCOLOGY | Facility: CLINIC | Age: 45
End: 2025-04-29
Payer: COMMERCIAL

## 2025-04-29 DIAGNOSIS — C77.9 SECONDARY ADENOCARCINOMA OF LYMPH NODE: ICD-10-CM

## 2025-04-29 DIAGNOSIS — R21 RASH: ICD-10-CM

## 2025-04-29 DIAGNOSIS — C18.7 ADENOCARCINOMA OF SIGMOID COLON: Primary | ICD-10-CM

## 2025-04-29 PROCEDURE — G2211 COMPLEX E/M VISIT ADD ON: HCPCS | Mod: 95,,, | Performed by: INTERNAL MEDICINE

## 2025-04-29 PROCEDURE — 98007 SYNCH AUDIO-VIDEO EST HI 40: CPT | Mod: 95,,, | Performed by: INTERNAL MEDICINE

## 2025-04-29 NOTE — PROGRESS NOTES
"Subjective     Patient ID: Mason Rodriguez is a 44 y.o. male.  The patient location is: home  The chief complaint leading to consultation is: colon cancer on adjuvant chemo     Visit type: audiovisual    Notes:    Chief Complaint: Colon Cancer  Mr. Rodriguez is a 44-year-old male who underwent a screening colonoscopy on 11/26/2024 and was noted to have a 17 mm pedunculated polyp in the sigmoid colon, pathology revealed adenocarcinoma arising in a tubular adenoma.    On 01/28/2025 he underwent robotic sigmoid colectomy  Pathology revealed metastatic adenocarcinoma in the sigmoid colon in 1 of the 13  lymph nodes ( see addendum below) .   PROCEDURE: Sigmoidectomy   TUMOR SITE: Sigmoid colon   HISTOLOGIC TYPE: Adenocarcinoma arising in a tubular adenoma   HISTOLOGIC GRADE: Moderately differentiated   TUMOR SIZE: Polyp largely replaced by invasive moderately differentiated    adenocarcinoma   MULTIPLE PRIMARY SITES: Negative   TUMOR EXTENT: Extending through muscularis mucosa into submucosa   SUB-MUCOSAL INVASION: Minimal (polypectomy margin only)   LYMPHATIC AND/OR VASCULAR INVASION: Negative   PERINEURAL INVASION: Negative   TUMOR BUDDING SCORE: Negative   MARGIN STATUS FOR INVASIVE CARCINOMA: Negative   MARGIN STATUS FOR NON-INVASIVE TUMOR: Negative   DNA MISMATCH REPAIR STATUS (MMR): Pending on bishop metastasis.   REGIONAL LYMPH NODE STATUS:   NUMBER OF LYMPH NODES WITH TUMOR: At least one (1)   NUMBER OF LYMPH NODES EXAMINED: Two (2) - additional pending   TUMOR DEPOSITS: Pending   PATHOLOGIC STAGE CLASSIFICATION: pT1 pN pending, at least N1a   The addtional sections described below yield eleven additional lymph    nodes, all negative for tumor. The final node assessment is metastatic    carcinoma in one of thirteen lymph nodes (1/13) with the final bishop    stage pN1a.   Immunohistochemistry (IHC) Results for Mismatch Repair (MMR) Proteins WAYNE    Off note he had a PET scan on 12/20/2024 which revealed "Hypermetabolic " "nodule or node posterior to the right hilum with adjacent consolidation.  The overall appearance on CT is more suggestive of an infectious/inflammatory process than metastatic malignancy, but close follow-up will be necessary to rule out a malignant etiology.  Alternatively, further evaluation with EBUS could be pursued. Activity near the rectosigmoid junction may correspond to the site of malignant polyp resection with differential possibilities as outlined above.  No metastatic disease to lymph nodes or liver"     He underwent EBUS on 1/3/2025 and pathology revealed RIGHT LOWER LOBE LUNG NODULE FINE NEEDLE ASPIRATE. NEGATIVE FOR MALIGNANT CELLS. NON-CASEATING GRANULOMA/GIANT CELLS PRESENT"     He started  adjuvant chemo with XELOX on 3/13/2205 and cycle 2 was on 4/3/2025      HPI he comes in for cycle 3 of adjuvant chemotherapy with XELOX    He was recently seen in the clinic on 04/22/2025 secondary to multiple episodes of diarrhea vomiting pain and burning at the tip of his penis.  CT scans revealed proximal sigmoid colon with some surrounding pericolonic inflammation and mild wall thickening of a portion of the mid sigmoid colon could reflect an element of mild sigmoid colitis.  Stool studies were negative.  E scribed Cipro Flagyl and Lomotil and advised him to let me know if symptoms do not improve  He was also having some redness and swelling of his penis this was a virtual visit but was able to see the examined part, discussed with Urology and they recommended Lotrisone and if it does not resolve referred to Dermatology      His diarrhea has resolved and he is completing his antibiotics. He notes redness and tenderness of his foreskin     He had some tingling and numbness after chemo and then resolved   ECOG ps is zero    Review of Systems   Constitutional:  Negative for appetite change and unexpected weight change.   HENT:  Negative for mouth sores.    Eyes:  Negative for visual disturbance.   Respiratory:  " Negative for cough and shortness of breath.    Cardiovascular:  Negative for chest pain.   Gastrointestinal:  Negative for abdominal pain and diarrhea.   Genitourinary:  Negative for frequency.   Musculoskeletal:  Negative for back pain.   Integumentary:  Positive for rash.   Neurological:  Negative for headaches.   Hematological:  Negative for adenopathy.   Psychiatric/Behavioral:  The patient is nervous/anxious.           Objective     Physical Exam     LABS:  WBC   Date Value Ref Range Status   04/22/2025 6.35 3.90 - 12.70 K/uL Final     Hemoglobin   Date Value Ref Range Status   04/21/2025 13.0 (L) 14.0 - 18.0 g/dL Final     HGB   Date Value Ref Range Status   04/22/2025 13.5 (L) 14.0 - 18.0 gm/dL Final     Hematocrit   Date Value Ref Range Status   04/21/2025 37.4 (L) 40.0 - 54.0 % Final     HCT   Date Value Ref Range Status   04/22/2025 38.8 (L) 40.0 - 54.0 % Final     Platelet Count   Date Value Ref Range Status   04/22/2025 197 150 - 450 K/uL Final     Platelets   Date Value Ref Range Status   04/21/2025 182 150 - 450 K/uL Final     Gran # (ANC)   Date Value Ref Range Status   04/21/2025 4.3 1.8 - 7.7 K/uL Final     Gran %   Date Value Ref Range Status   04/21/2025 54.8 38.0 - 73.0 % Final       Chemistry        Component Value Date/Time     04/22/2025 1336     04/21/2025 2026    K 3.9 04/22/2025 1336    K 3.8 04/21/2025 2026     04/22/2025 1336     04/21/2025 2026    CO2 25 04/22/2025 1336    CO2 24 04/21/2025 2026    BUN 11 04/22/2025 1336    CREATININE 1.2 04/22/2025 1336    GLU 91 04/21/2025 2026        Component Value Date/Time    CALCIUM 8.7 04/22/2025 1336    CALCIUM 8.4 (L) 04/21/2025 2026    ALKPHOS 46 04/22/2025 1336    ALKPHOS 51 04/21/2025 2026    AST 30 04/22/2025 1336    AST 35 04/21/2025 2026    ALT 32 04/22/2025 1336    ALT 31 04/21/2025 2026    BILITOT 0.4 04/22/2025 1336    BILITOT 0.3 04/21/2025 2026    ESTGFRAFRICA >60 08/13/2020 0849    EGFRNONAA >60 08/13/2020  0849          Assessment and Plan     1. Adenocarcinoma of sigmoid colon    2. Secondary adenocarcinoma of lymph node      Route Chart for Scheduling    Med Onc Chart Routing      Follow up with physician . Schedule consult with dermatology next available.  Leave labs and chemo as scheduled on 5/2. Then on 5/22 schedule CBC, CMP and see me. 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   noted on 2/11/2025  Associated diagnosis: Adenocarcinoma of sigmoid colon Stage IIIA pT2, pN1, cM0 noted on 1/2/2025   Line of treatment: Adjuvant  Treatment Goal: Curative     Upcoming Treatment Dates - OP COLORECTAL CAPECITABINE OXALIPLATIN    5/2/2025       Pre-Medications       palonosetron 0.25mg/dexAMETHasone 12mg in NS IVPB 0.25 mg 50 mL       Chemotherapy       oxaliplatin (ELOXATIN) 130 mg/m2 = 308 mg in D5W 561.6 mL chemo infusion  5/23/2025       Pre-Medications       palonosetron 0.25mg/dexAMETHasone 12mg in NS IVPB 0.25 mg 50 mL       Chemotherapy       oxaliplatin (ELOXATIN) 130 mg/m2 = 308 mg in D5W 561.6 mL chemo infusion         Mr. Rodriguez Is now improved clinically from his diarrhea, he has a few more days of oral antibiotics which he will complete  He will return on 05/02/2025 with labs and if adequate we will proceed with cycle 3 of adjuvant XELOX chemotherapy on the same day  I will plan on seeing him in 3 weeks prior to last cycle of chemotherapy.    Erythema and redness of the for skin:  Discussed with Urology they had recommended Lotrisone cream, and symptoms have not improved.  Plan was referral to Dermatology if symptoms do not improve so referral placed for them    Visit today included increased complexity associated with  the care of the episodic problem adjuvant chemotherapy addressed and managing the longitudinal care of the patient due to the serious and/or complex managed problem(s) colon cancer    Above plan reviewed with the patient and all questions were answered to his satisfaction

## 2025-05-02 ENCOUNTER — INFUSION (OUTPATIENT)
Dept: INFUSION THERAPY | Facility: HOSPITAL | Age: 45
End: 2025-05-02
Attending: INTERNAL MEDICINE
Payer: COMMERCIAL

## 2025-05-02 ENCOUNTER — LAB VISIT (OUTPATIENT)
Dept: LAB | Facility: HOSPITAL | Age: 45
End: 2025-05-02
Attending: INTERNAL MEDICINE
Payer: COMMERCIAL

## 2025-05-02 ENCOUNTER — DOCUMENTATION ONLY (OUTPATIENT)
Dept: INFUSION THERAPY | Facility: HOSPITAL | Age: 45
End: 2025-05-02
Payer: COMMERCIAL

## 2025-05-02 VITALS
DIASTOLIC BLOOD PRESSURE: 89 MMHG | WEIGHT: 230.38 LBS | HEART RATE: 78 BPM | BODY MASS INDEX: 28.65 KG/M2 | TEMPERATURE: 98 F | HEIGHT: 75 IN | OXYGEN SATURATION: 99 % | RESPIRATION RATE: 18 BRPM | SYSTOLIC BLOOD PRESSURE: 127 MMHG

## 2025-05-02 DIAGNOSIS — C77.9 SECONDARY ADENOCARCINOMA OF LYMPH NODE: Primary | ICD-10-CM

## 2025-05-02 DIAGNOSIS — C18.7 ADENOCARCINOMA OF SIGMOID COLON: ICD-10-CM

## 2025-05-02 LAB
ABSOLUTE EOSINOPHIL (OHS): 0.15 K/UL
ABSOLUTE MONOCYTE (OHS): 0.64 K/UL (ref 0.3–1)
ABSOLUTE NEUTROPHIL COUNT (OHS): 3.35 K/UL (ref 1.8–7.7)
ALBUMIN SERPL BCP-MCNC: 4.1 G/DL (ref 3.5–5.2)
ALP SERPL-CCNC: 57 UNIT/L (ref 40–150)
ALT SERPL W/O P-5'-P-CCNC: 48 UNIT/L (ref 10–44)
ANION GAP (OHS): 10 MMOL/L (ref 8–16)
AST SERPL-CCNC: 34 UNIT/L (ref 11–45)
BASOPHILS # BLD AUTO: 0.09 K/UL
BASOPHILS NFR BLD AUTO: 1.5 %
BILIRUB SERPL-MCNC: 0.5 MG/DL (ref 0.1–1)
BUN SERPL-MCNC: 20 MG/DL (ref 6–20)
CALCIUM SERPL-MCNC: 9.3 MG/DL (ref 8.7–10.5)
CHLORIDE SERPL-SCNC: 105 MMOL/L (ref 95–110)
CO2 SERPL-SCNC: 24 MMOL/L (ref 23–29)
CREAT SERPL-MCNC: 1.1 MG/DL (ref 0.5–1.4)
ERYTHROCYTE [DISTWIDTH] IN BLOOD BY AUTOMATED COUNT: 17.8 % (ref 11.5–14.5)
GFR SERPLBLD CREATININE-BSD FMLA CKD-EPI: >60 ML/MIN/1.73/M2
GLUCOSE SERPL-MCNC: 115 MG/DL (ref 70–110)
HCT VFR BLD AUTO: 44.7 % (ref 40–54)
HGB BLD-MCNC: 15.2 GM/DL (ref 14–18)
IMM GRANULOCYTES # BLD AUTO: 0.01 K/UL (ref 0–0.04)
IMM GRANULOCYTES NFR BLD AUTO: 0.2 % (ref 0–0.5)
LYMPHOCYTES # BLD AUTO: 1.62 K/UL (ref 1–4.8)
MCH RBC QN AUTO: 31.8 PG (ref 27–31)
MCHC RBC AUTO-ENTMCNC: 34 G/DL (ref 32–36)
MCV RBC AUTO: 94 FL (ref 82–98)
NUCLEATED RBC (/100WBC) (OHS): 0 /100 WBC
PLATELET # BLD AUTO: 194 K/UL (ref 150–450)
PMV BLD AUTO: 9.6 FL (ref 9.2–12.9)
POTASSIUM SERPL-SCNC: 4.4 MMOL/L (ref 3.5–5.1)
PROT SERPL-MCNC: 7.7 GM/DL (ref 6–8.4)
RBC # BLD AUTO: 4.78 M/UL (ref 4.6–6.2)
RELATIVE EOSINOPHIL (OHS): 2.6 %
RELATIVE LYMPHOCYTE (OHS): 27.6 % (ref 18–48)
RELATIVE MONOCYTE (OHS): 10.9 % (ref 4–15)
RELATIVE NEUTROPHIL (OHS): 57.2 % (ref 38–73)
SODIUM SERPL-SCNC: 139 MMOL/L (ref 136–145)
WBC # BLD AUTO: 5.86 K/UL (ref 3.9–12.7)

## 2025-05-02 PROCEDURE — 84460 ALANINE AMINO (ALT) (SGPT): CPT | Mod: PN

## 2025-05-02 PROCEDURE — 96415 CHEMO IV INFUSION ADDL HR: CPT | Mod: PN

## 2025-05-02 PROCEDURE — 85025 COMPLETE CBC W/AUTO DIFF WBC: CPT | Mod: PN

## 2025-05-02 PROCEDURE — 25000003 PHARM REV CODE 250: Mod: PN | Performed by: INTERNAL MEDICINE

## 2025-05-02 PROCEDURE — 96413 CHEMO IV INFUSION 1 HR: CPT | Mod: PN

## 2025-05-02 PROCEDURE — 96367 TX/PROPH/DG ADDL SEQ IV INF: CPT | Mod: PN

## 2025-05-02 PROCEDURE — 36415 COLL VENOUS BLD VENIPUNCTURE: CPT | Mod: PN

## 2025-05-02 PROCEDURE — 63600175 PHARM REV CODE 636 W HCPCS: Mod: PN | Performed by: INTERNAL MEDICINE

## 2025-05-02 RX ORDER — DIPHENHYDRAMINE HYDROCHLORIDE 50 MG/ML
50 INJECTION, SOLUTION INTRAMUSCULAR; INTRAVENOUS ONCE AS NEEDED
Status: DISCONTINUED | OUTPATIENT
Start: 2025-05-02 | End: 2025-05-02 | Stop reason: HOSPADM

## 2025-05-02 RX ORDER — DIPHENHYDRAMINE HYDROCHLORIDE 50 MG/ML
50 INJECTION, SOLUTION INTRAMUSCULAR; INTRAVENOUS ONCE AS NEEDED
Status: CANCELLED | OUTPATIENT
Start: 2025-05-02

## 2025-05-02 RX ORDER — SODIUM CHLORIDE 0.9 % (FLUSH) 0.9 %
10 SYRINGE (ML) INJECTION
Status: CANCELLED | OUTPATIENT
Start: 2025-05-02

## 2025-05-02 RX ORDER — PROCHLORPERAZINE EDISYLATE 5 MG/ML
5 INJECTION INTRAMUSCULAR; INTRAVENOUS ONCE AS NEEDED
Status: DISCONTINUED | OUTPATIENT
Start: 2025-05-02 | End: 2025-05-02 | Stop reason: HOSPADM

## 2025-05-02 RX ORDER — HEPARIN 100 UNIT/ML
500 SYRINGE INTRAVENOUS
Status: DISCONTINUED | OUTPATIENT
Start: 2025-05-02 | End: 2025-05-02 | Stop reason: HOSPADM

## 2025-05-02 RX ORDER — EPINEPHRINE 0.3 MG/.3ML
0.3 INJECTION SUBCUTANEOUS ONCE AS NEEDED
Status: DISCONTINUED | OUTPATIENT
Start: 2025-05-02 | End: 2025-05-02 | Stop reason: HOSPADM

## 2025-05-02 RX ORDER — EPINEPHRINE 0.3 MG/.3ML
0.3 INJECTION SUBCUTANEOUS ONCE AS NEEDED
Status: CANCELLED | OUTPATIENT
Start: 2025-05-02

## 2025-05-02 RX ORDER — PROCHLORPERAZINE EDISYLATE 5 MG/ML
5 INJECTION INTRAMUSCULAR; INTRAVENOUS ONCE AS NEEDED
Status: CANCELLED
Start: 2025-05-02

## 2025-05-02 RX ORDER — HEPARIN 100 UNIT/ML
500 SYRINGE INTRAVENOUS
Status: CANCELLED | OUTPATIENT
Start: 2025-05-02

## 2025-05-02 RX ORDER — SODIUM CHLORIDE 0.9 % (FLUSH) 0.9 %
10 SYRINGE (ML) INJECTION
Status: DISCONTINUED | OUTPATIENT
Start: 2025-05-02 | End: 2025-05-02 | Stop reason: HOSPADM

## 2025-05-02 RX ADMIN — DEXAMETHASONE SODIUM PHOSPHATE 0.25 MG: 10 INJECTION, SOLUTION INTRAMUSCULAR; INTRAVENOUS at 11:05

## 2025-05-02 RX ADMIN — OXALIPLATIN 300 MG: 5 INJECTION, SOLUTION INTRAVENOUS at 12:05

## 2025-05-02 RX ADMIN — DEXTROSE MONOHYDRATE: 5 INJECTION, SOLUTION INTRAVENOUS at 11:05

## 2025-05-02 NOTE — PROGRESS NOTES
Oncology Nutrition   Chemotherapy Infusion Visit    Nutrition Follow Up   RD met with pt at chairside during cycle 3 capox. Pt recently went to ED on 04/22/25 for diarrhea and abdominal pain. He was diagnosed with colitis and prescribed flagyl, cipro, and lomotil. Pt reports he is feeling much better and diarrhea has resolved. RD provided pt with samples of Banatrol.   Pt reports good appetite and denies any nutrition related side effects. Pt is having some thick film in his mouth. RD provided pt with baking soda/salt mouthwash and told pt to swish before and after all meals. Pt weight has been fluctuating but overall stable.     Wt Readings from Last 10 Encounters:   05/02/25 104.5 kg (230 lb 6.1 oz)   04/24/25 104.5 kg (230 lb 6.4 oz)   04/04/25 105.5 kg (232 lb 9.4 oz)   04/03/25 105.9 kg (233 lb 7.5 oz)   03/14/25 106.5 kg (234 lb 12.6 oz)   03/13/25 109.3 kg (240 lb 15.4 oz)   02/21/25 106.8 kg (235 lb 7.2 oz)   02/20/25 106.7 kg (235 lb 3.7 oz)   02/19/25 108 kg (238 lb 3.3 oz)   02/12/25 107.3 kg (236 lb 8.9 oz)       All other nutrition questions/concerns addressed as appropriate. Will continue to monitor prn throughout treatment.     Christine Kaufman, DARIA, LDN  05/02/2025  1:56 PM

## 2025-05-07 DIAGNOSIS — C18.7 ADENOCARCINOMA OF SIGMOID COLON: ICD-10-CM

## 2025-05-07 RX ORDER — CAPECITABINE 500 MG/1
2000 TABLET, FILM COATED ORAL 2 TIMES DAILY
Qty: 112 TABLET | Refills: 0 | Status: ACTIVE | OUTPATIENT
Start: 2025-05-07 | End: 2026-05-07

## 2025-05-12 ENCOUNTER — TELEPHONE (OUTPATIENT)
Dept: HEMATOLOGY/ONCOLOGY | Facility: CLINIC | Age: 45
End: 2025-05-12
Payer: COMMERCIAL

## 2025-05-12 NOTE — TELEPHONE ENCOUNTER
----- Message from [place] sent at 5/12/2025 10:12 AM CDT -----  Type: Needs Medical AdviceWho Called:  April . [phone] [id number] (please be specific):  How long has patient had these symptoms:  Pharmacy name and phone #:  Best Call Back Number:[phone] Information: April called to inform that she is patient  if anything is needed.

## 2025-05-15 ENCOUNTER — TELEPHONE (OUTPATIENT)
Dept: PHARMACY | Facility: CLINIC | Age: 45
End: 2025-05-15
Payer: COMMERCIAL

## 2025-05-15 NOTE — TELEPHONE ENCOUNTER
[facility]/Population Health Chart Review & Patient Outreach Details For Medication Adherence Project    Reason for Outreach Encounter: 3rd Party payor non-compliance report ([employer], [employer], C, etc)  2.  Patient Outreach Method: Reviewed patient chart   3.   Medication in question:              4.  Reviewed and or Updates Made To: Patient Chart  5. Outreach Outcomes and/or actions taken: Medication discontinued  Additional Notes:   Discontinued enalapril 5 mg due to improved blood pressure control.

## 2025-05-19 ENCOUNTER — INFUSION (OUTPATIENT)
Dept: INFUSION THERAPY | Facility: HOSPITAL | Age: 45
End: 2025-05-19
Attending: INTERNAL MEDICINE
Payer: COMMERCIAL

## 2025-05-19 ENCOUNTER — PATIENT MESSAGE (OUTPATIENT)
Dept: HEMATOLOGY/ONCOLOGY | Facility: CLINIC | Age: 45
End: 2025-05-19
Payer: COMMERCIAL

## 2025-05-19 ENCOUNTER — DOCUMENTATION ONLY (OUTPATIENT)
Dept: HEMATOLOGY/ONCOLOGY | Facility: CLINIC | Age: 45
End: 2025-05-19
Payer: COMMERCIAL

## 2025-05-19 ENCOUNTER — OFFICE VISIT (OUTPATIENT)
Dept: HEMATOLOGY/ONCOLOGY | Facility: CLINIC | Age: 45
End: 2025-05-19
Payer: COMMERCIAL

## 2025-05-19 ENCOUNTER — HOSPITAL ENCOUNTER (OUTPATIENT)
Dept: RADIOLOGY | Facility: HOSPITAL | Age: 45
Discharge: HOME | End: 2025-05-19
Attending: INTERNAL MEDICINE
Payer: COMMERCIAL

## 2025-05-19 VITALS
HEIGHT: 75 IN | OXYGEN SATURATION: 99 % | TEMPERATURE: 98 F | DIASTOLIC BLOOD PRESSURE: 80 MMHG | RESPIRATION RATE: 17 BRPM | HEART RATE: 102 BPM | BODY MASS INDEX: 28.15 KG/M2 | SYSTOLIC BLOOD PRESSURE: 119 MMHG | WEIGHT: 226.44 LBS

## 2025-05-19 VITALS — TEMPERATURE: 101 F

## 2025-05-19 DIAGNOSIS — R19.7 DIARRHEA OF PRESUMED INFECTIOUS ORIGIN: ICD-10-CM

## 2025-05-19 DIAGNOSIS — R19.7 DIARRHEA OF PRESUMED INFECTIOUS ORIGIN: Primary | ICD-10-CM

## 2025-05-19 DIAGNOSIS — M79.604 LOWER EXTREMITY PAIN, DIFFUSE, RIGHT: Primary | ICD-10-CM

## 2025-05-19 DIAGNOSIS — R19.5 LOOSE STOOLS: ICD-10-CM

## 2025-05-19 DIAGNOSIS — R19.5 LOOSE STOOLS: Primary | ICD-10-CM

## 2025-05-19 PROBLEM — K52.9 COLITIS: Status: ACTIVE | Noted: 2025-05-19

## 2025-05-19 PROCEDURE — 25000003 PHARM REV CODE 250: Mod: PN | Performed by: NURSE PRACTITIONER

## 2025-05-19 PROCEDURE — A4216 STERILE WATER/SALINE, 10 ML: HCPCS | Mod: PN | Performed by: NURSE PRACTITIONER

## 2025-05-19 PROCEDURE — 1159F MED LIST DOCD IN RCRD: CPT | Mod: CPTII,S$GLB,, | Performed by: NURSE PRACTITIONER

## 2025-05-19 PROCEDURE — 3074F SYST BP LT 130 MM HG: CPT | Mod: CPTII,S$GLB,, | Performed by: NURSE PRACTITIONER

## 2025-05-19 PROCEDURE — 63600175 PHARM REV CODE 636 W HCPCS: Mod: PN | Performed by: NURSE PRACTITIONER

## 2025-05-19 PROCEDURE — 99999 PR PBB SHADOW E&M-EST. PATIENT-LVL V: CPT | Mod: PBBFAC,,, | Performed by: NURSE PRACTITIONER

## 2025-05-19 PROCEDURE — 99215 OFFICE O/P EST HI 40 MIN: CPT | Mod: S$GLB,,, | Performed by: NURSE PRACTITIONER

## 2025-05-19 PROCEDURE — 96360 HYDRATION IV INFUSION INIT: CPT | Mod: PN

## 2025-05-19 PROCEDURE — 1160F RVW MEDS BY RX/DR IN RCRD: CPT | Mod: CPTII,S$GLB,, | Performed by: NURSE PRACTITIONER

## 2025-05-19 PROCEDURE — 3008F BODY MASS INDEX DOCD: CPT | Mod: CPTII,S$GLB,, | Performed by: NURSE PRACTITIONER

## 2025-05-19 PROCEDURE — 3079F DIAST BP 80-89 MM HG: CPT | Mod: CPTII,S$GLB,, | Performed by: NURSE PRACTITIONER

## 2025-05-19 PROCEDURE — 96361 HYDRATE IV INFUSION ADD-ON: CPT | Mod: PN

## 2025-05-19 PROCEDURE — 74019 RADEX ABDOMEN 2 VIEWS: CPT | Mod: TC,FY,PO

## 2025-05-19 PROCEDURE — G2211 COMPLEX E/M VISIT ADD ON: HCPCS | Mod: S$GLB,,, | Performed by: NURSE PRACTITIONER

## 2025-05-19 PROCEDURE — 96375 TX/PRO/DX INJ NEW DRUG ADDON: CPT | Mod: PN

## 2025-05-19 PROCEDURE — 74019 RADEX ABDOMEN 2 VIEWS: CPT | Mod: 26,,, | Performed by: RADIOLOGY

## 2025-05-19 RX ORDER — SODIUM CHLORIDE 9 MG/ML
1000 INJECTION, SOLUTION INTRAVENOUS
Status: COMPLETED | OUTPATIENT
Start: 2025-05-19 | End: 2025-05-19

## 2025-05-19 RX ORDER — ONDANSETRON HYDROCHLORIDE 2 MG/ML
8 INJECTION, SOLUTION INTRAVENOUS ONCE
Status: CANCELLED
Start: 2025-05-19 | End: 2025-05-19

## 2025-05-19 RX ORDER — ONDANSETRON HYDROCHLORIDE 2 MG/ML
8 INJECTION, SOLUTION INTRAVENOUS ONCE
Status: COMPLETED | OUTPATIENT
Start: 2025-05-19 | End: 2025-05-19

## 2025-05-19 RX ORDER — SODIUM CHLORIDE 0.9 % (FLUSH) 0.9 %
10 SYRINGE (ML) INJECTION
Status: CANCELLED | OUTPATIENT
Start: 2025-05-19

## 2025-05-19 RX ORDER — SODIUM CHLORIDE 0.9 % (FLUSH) 0.9 %
10 SYRINGE (ML) INJECTION
Status: DISCONTINUED | OUTPATIENT
Start: 2025-05-19 | End: 2025-05-19 | Stop reason: HOSPADM

## 2025-05-19 RX ORDER — HEPARIN 100 UNIT/ML
500 SYRINGE INTRAVENOUS
Status: DISCONTINUED | OUTPATIENT
Start: 2025-05-19 | End: 2025-05-19 | Stop reason: HOSPADM

## 2025-05-19 RX ORDER — HEPARIN 100 UNIT/ML
500 SYRINGE INTRAVENOUS
Status: CANCELLED | OUTPATIENT
Start: 2025-05-19

## 2025-05-19 RX ORDER — ACETAMINOPHEN 325 MG/1
650 TABLET ORAL
Status: COMPLETED | OUTPATIENT
Start: 2025-05-19 | End: 2025-05-19

## 2025-05-19 RX ADMIN — SODIUM CHLORIDE, PRESERVATIVE FREE 10 ML: 5 INJECTION INTRAVENOUS at 04:05

## 2025-05-19 RX ADMIN — ONDANSETRON 8 MG: 2 INJECTION INTRAMUSCULAR; INTRAVENOUS at 03:05

## 2025-05-19 RX ADMIN — SODIUM CHLORIDE 1000 ML: 9 INJECTION, SOLUTION INTRAVENOUS at 03:05

## 2025-05-19 RX ADMIN — ACETAMINOPHEN 650 MG: 325 TABLET ORAL at 04:05

## 2025-05-19 NOTE — PLAN OF CARE
Problem: Chemotherapy Effects  Goal: Nausea and Vomiting Relief  Outcome: Progressing  Intervention: Prevent or Manage Nausea and Vomiting  Flowsheets (Taken 5/19/2025 1538)  Fluid/Electrolyte Management: fluids provided  Environmental Support:   calm environment promoted   distractions minimized   rest periods encouraged   personal routine supported     Problem: Chemotherapy Effects  Goal: Nausea and Vomiting Relief  Intervention: Prevent or Manage Nausea and Vomiting  Flowsheets (Taken 5/19/2025 1538)  Fluid/Electrolyte Management: fluids provided  Environmental Support:   calm environment promoted   distractions minimized   rest periods encouraged   personal routine supported   Zofran also given as ordered  Problem: Adult Inpatient Plan of Care  Goal: Plan of Care Review  Outcome: Met  PT STATED THE NP CALLED HIM AND SAID THAT HE SHOULD GO TO THE ER   PT STATES THAT HIS WIFE IS AN HOUR AWAY AND HE WANTS TO DRIVE HISSELF .  PT AWAKE ALERT AND ORIENTED TIMES THREE.  STATES HE FEELS FINE TO DRIVE.  AMBULATED PER SELF TO PRIVATE VEHICLE NAD

## 2025-05-19 NOTE — PROGRESS NOTES
"Subjective     Patient ID: Mason Rodriguez is a 44 y.o. male.    Chief Complaint: Follow-up (Diarrhea)    Oncology History (per record): Mr. Rodriguez is a 44-year-old male who underwent a screening colonoscopy on 11/26/2024 and was noted to have a 17 mm pedunculated polyp in the sigmoid colon, pathology revealed adenocarcinoma arising in a tubular adenoma.    On 01/28/2025 he underwent robotic sigmoid colectomy  Pathology revealed metastatic adenocarcinoma in the sigmoid colon in 1 of the 13  lymph nodes ( see addendum below) .   PROCEDURE: Sigmoidectomy   TUMOR SITE: Sigmoid colon   HISTOLOGIC TYPE: Adenocarcinoma arising in a tubular adenoma   HISTOLOGIC GRADE: Moderately differentiated   TUMOR SIZE: Polyp largely replaced by invasive moderately differentiated    adenocarcinoma   MULTIPLE PRIMARY SITES: Negative   TUMOR EXTENT: Extending through muscularis mucosa into submucosa   SUB-MUCOSAL INVASION: Minimal (polypectomy margin only)   LYMPHATIC AND/OR VASCULAR INVASION: Negative   PERINEURAL INVASION: Negative   TUMOR BUDDING SCORE: Negative   MARGIN STATUS FOR INVASIVE CARCINOMA: Negative   MARGIN STATUS FOR NON-INVASIVE TUMOR: Negative   DNA MISMATCH REPAIR STATUS (MMR): Pending on bishop metastasis.   REGIONAL LYMPH NODE STATUS:   NUMBER OF LYMPH NODES WITH TUMOR: At least one (1)   NUMBER OF LYMPH NODES EXAMINED: Two (2) - additional pending   TUMOR DEPOSITS: Pending   PATHOLOGIC STAGE CLASSIFICATION: pT1 pN pending, at least N1a   The addtional sections described below yield eleven additional lymph    nodes, all negative for tumor. The final node assessment is metastatic    carcinoma in one of thirteen lymph nodes (1/13) with the final bishop    stage pN1a.   Immunohistochemistry (IHC) Results for Mismatch Repair (MMR) Proteins WAYNE    Off note he had a PET scan on 12/20/2024 which revealed "Hypermetabolic nodule or node posterior to the right hilum with adjacent consolidation.  The overall appearance on CT is " "more suggestive of an infectious/inflammatory process than metastatic malignancy, but close follow-up will be necessary to rule out a malignant etiology.  Alternatively, further evaluation with EBUS could be pursued. Activity near the rectosigmoid junction may correspond to the site of malignant polyp resection with differential possibilities as outlined above.  No metastatic disease to lymph nodes or liver"     He underwent EBUS on 1/3/2025 and pathology revealed RIGHT LOWER LOBE LUNG NODULE FINE NEEDLE ASPIRATE. NEGATIVE FOR MALIGNANT CELLS. NON-CASEATING GRANULOMA/GIANT CELLS PRESENT"     He started  adjuvant chemo with XELOX on 3/13/2025 and cycle 2 was on 4/3/2025      HPI Patient comes to clinic with c/o abdominal pain that is accompanied with diarrhea (loose/watery). Pain is relieved to a 7/10 when lying down but is a 10/10 when standing. He has been taking Lomotil along with anti-nausea medication which has not helped these symptoms. He has not eaten any food since Sunday morning. He is drinking some water. Patient also endorses that he did have an episode of vomiting after smelling his stool as it does smell worse than most of his bowel movements. He completed Cipro and Flagyl. He has also been running fever, highest was 102.9 and this AM was 100.4 - last took Tylenol this AM at 5 AM.    Patient is c/o right lower extremity shooting pain that just started within the last couple days.     Indicates that the swelling and irritation to his foreskin has also returned. He is putting topical cream on it (lotrisone).     ECOG ps is zero    Review of Systems   Constitutional:  Negative for appetite change and unexpected weight change.   HENT:  Negative for mouth sores.    Eyes:  Negative for visual disturbance.   Respiratory:  Negative for cough and shortness of breath.    Cardiovascular:  Negative for chest pain.   Gastrointestinal:  Positive for abdominal pain, diarrhea, nausea and vomiting (one episode). "   Genitourinary:  Negative for frequency.   Musculoskeletal:  Negative for back pain.   Integumentary:  Positive for rash (foreskin).   Neurological:  Negative for headaches.   Hematological:  Negative for adenopathy.   Psychiatric/Behavioral:  The patient is nervous/anxious.           Objective     Physical Exam  Constitutional:       General: He is in acute distress.      Appearance: He is not diaphoretic.   Eyes:      General: No scleral icterus.  Cardiovascular:      Rate and Rhythm: Normal rate and regular rhythm.      Heart sounds: Normal heart sounds. No murmur heard.  Pulmonary:      Effort: No respiratory distress.      Breath sounds: No wheezing.   Abdominal:      General: There is no distension.      Palpations: Abdomen is soft.      Tenderness: There is abdominal tenderness (lower abdomen).   Musculoskeletal:      Right lower leg: No edema.      Left lower leg: No edema.   Neurological:      Mental Status: He is alert.          LABS:  WBC   Date Value Ref Range Status   05/19/2025 6.05 3.90 - 12.70 K/uL Final     Hemoglobin   Date Value Ref Range Status   04/21/2025 13.0 (L) 14.0 - 18.0 g/dL Final     HGB   Date Value Ref Range Status   05/19/2025 14.2 14.0 - 18.0 gm/dL Final     Hematocrit   Date Value Ref Range Status   04/21/2025 37.4 (L) 40.0 - 54.0 % Final     HCT   Date Value Ref Range Status   05/19/2025 39.8 (L) 40.0 - 54.0 % Final     Platelet Count   Date Value Ref Range Status   05/19/2025 167 150 - 450 K/uL Final     Platelets   Date Value Ref Range Status   04/21/2025 182 150 - 450 K/uL Final     Gran # (ANC)   Date Value Ref Range Status   04/21/2025 4.3 1.8 - 7.7 K/uL Final     Gran %   Date Value Ref Range Status   04/21/2025 54.8 38.0 - 73.0 % Final       Chemistry        Component Value Date/Time     05/19/2025 1401     04/21/2025 2026    K 3.7 05/19/2025 1401    K 3.8 04/21/2025 2026     05/19/2025 1401     04/21/2025 2026    CO2 23 05/19/2025 1401    CO2 24  04/21/2025 2026    BUN 17 05/19/2025 1401    CREATININE 1.0 05/19/2025 1401     05/19/2025 1401    GLU 91 04/21/2025 2026        Component Value Date/Time    CALCIUM 8.5 (L) 05/19/2025 1401    CALCIUM 8.4 (L) 04/21/2025 2026    ALKPHOS 41 05/19/2025 1401    ALKPHOS 51 04/21/2025 2026    AST 19 05/19/2025 1401    AST 35 04/21/2025 2026    ALT 17 05/19/2025 1401    ALT 31 04/21/2025 2026    BILITOT 0.7 05/19/2025 1401    BILITOT 0.3 04/21/2025 2026    ESTGFRAFRICA >60 08/13/2020 0849    EGFRNONAA >60 08/13/2020 0849          Assessment and Plan     1. Lower extremity pain, diffuse, right  -     US RLE VENOUS; Future; Expected date: 05/19/2025    2. Diarrhea of presumed infectious origin    Other orders  -     Cancel: sodium chloride 0.9% bolus 1,000 mL 1,000 mL  -     Cancel: ondansetron injection 8 mg  -     Cancel: sodium chloride 0.9% flush 10 mL  -     Cancel: heparin, porcine (PF) 100 unit/mL injection flush 500 Units  -     Cancel: alteplase injection 2 mg        Route Chart for Scheduling  Med Onc Route Chart for Scheduling    Treatment Plan Information   OP COLORECTAL CAPECITABINE OXALIPLATIN Edel Lerner MD   Associated diagnosis: Secondary adenocarcinoma of lymph node   noted on 2/11/2025  Associated diagnosis: Adenocarcinoma of sigmoid colon Stage IIIA pT2, pN1, cM0 noted on 1/2/2025   Line of treatment: Adjuvant  Treatment Goal: Curative     Upcoming Treatment Dates - OP COLORECTAL CAPECITABINE OXALIPLATIN    5/23/2025       Pre-Medications       palonosetron 0.25mg/dexAMETHasone 12mg in NS IVPB 0.25 mg 50 mL       Chemotherapy       oxaliplatin (ELOXATIN) 130 mg/m2 = 308 mg in D5W 561.6 mL chemo infusion    Supportive Plan Information  IV FLUIDS AND ELECTROLYTES VIVIANE Horvath   Associated Diagnosis: Diarrhea of presumed infectious origin   noted on 5/19/2025   Line of treatment: Supportive Care   Treatment goal: Supportive     Upcoming Treatment Dates - IV FLUIDS AND ELECTROLYTES    No  "upcoming days in selected categories.     Mr. Rodriguez reports with abdominal pain and diarrhea. He also endorses fever up to 102.9. Currently 100.5. Will send patient up to infusion for 1L NS and 8 mg of Zofran.  XR shows "Mildly distended air-filled loops of small bowel and scattered air-fluid levels although nonspecific most in keeping enteritis versus less likely early/partial small bowel obstruction."   Stool sent off for c.diff and  ova/parasites. Sending patient to ER for IV antibiotics.    Patient c/o right lower extremity shooting pain that started within the last couple of days that is intermittent, especially with walking. He denies any swelling or redness at this time. Will order a STAT RLE US to r/o DVT.    He would also like to discuss not doing the last cycle of XELOX due to the difficulty he has recently had with treatment.     Erythema and redness of the foreskin:  Referred to dermatology - returned with recent diarrhea/abdominal pain. Currently using Lotrisone.    Visit today included increased complexity associated with the care of the episodic problem abdominal pain/diarrhea addressed and managing the longitudinal care of the patient due to the serious and/or complex managed problem(s) colon cancer    Above plan reviewed with the patient and all questions were answered to his satisfaction    DIVYA Horvath  Hematology and Medical Oncology  [facility]    46 minutes of total time spent on the encounter, which includes face to face time and non-face to face time preparing to see the patient (eg, review of tests), Obtaining and/or reviewing separately obtained history, documenting clinical information in the electronic or other health record, independently interpreting results if documented above (not separately reported) and communicating results to the patient/family/caregiver, or Care coordination (not separately reported).   "

## 2025-05-19 NOTE — TELEPHONE ENCOUNTER
Medicare Annual Wellness Visit  Name: Zoë Patel Date: 10/1/2020   MRN: [id number] Sex: Female   Age: 79 y.o. Ethnicity: Non-/Non    : 1935 Race: Silvino Calero is here for Medicare AWV and Knee Pain (arthritis in left knee )    Screenings for behavioral, psychosocial and functional/safety risks, and cognitive dysfunction are all negative except as indicated below. These results, as well as other patient data from the [address] form, are documented in Flowsheets linked to this Encounter. Allergies   Allergen Reactions    Codeine Nausea And Vomiting    Sulfa Antibiotics Nausea And Vomiting         Prior to Visit Medications    Medication Sig Taking? Authorizing Provider   Tdap (ADACEL) 5-2-15.5 LF-MCG/0.5 injection Inject 0.5 mLs into the muscle once for 1 dose Yes Heather Brewster MD   zoster recombinant adjuvanted vaccine [facility]) 50 MCG/0.5ML SUSR injection 50 MCG IM then repeat 2-6 months. Yes Heather Brewster MD   lisinopril (PRINIVIL;ZESTRIL) 20 MG tablet TAKE ONE TABLET BY MOUTH ONCE A DAY Yes Heather Brewster MD   hydroCHLOROthiazide (MICROZIDE) 12.5 MG capsule Take 1 capsule by mouth every morning Yes Heather Brewster MD   amLODIPine (NORVASC) 10 MG tablet Take 1 tablet by mouth daily Yes Heather Brewster MD   citalopram (CELEXA) 20 MG tablet TAKE ONE TABLET BY MOUTH DAILY Yes Heather Brewster MD   fluticasone (FLONASE) 50 MCG/ACT nasal spray 2 sprays by Each Nare route daily Yes Heather Brewster MD   vitamin D (CHOLECALCIFEROL) 1000 units TABS tablet Take 1,000 Units by mouth daily Yes Historical Provider, MD   Naproxen Sodium (ALEVE PO) Take by mouth 2 times daily Yes Historical Provider, MD   Bioflavonoid Products (BIOFLEX PO) Take by mouth daily Yes Historical Provider, MD   DORZOLAMIDE HCL-TIMOLOL MAL OP Apply to eye 1 drop every 12 hours Yes Historical Provider, MD   Flaxseed, Linseed, (FLAXSEED OIL) 1000 MG CAPS Take  by mouth. Otis I agree, if he looks bad send him to ED, I am hoping it is not as bad as it sounds  Thanks for seeing him   Yes Historical Provider, MD   Multiple Vitamins-Minerals (THERAPEUTIC MULTIVITAMIN-MINERALS) tablet Take 1 tablet by mouth daily. Yes Historical Provider, MD   Ascorbic Acid (VITAMIN C) 500 MG tablet Take 500 mg by mouth daily. Yes Historical Provider, MD   alendronate (FOSAMAX) 70 MG tablet Take 1 tablet by mouth every 7 days  Patient not taking: Reported on 10/1/2020  Demetrio Williamson MD         Past Medical History:   Diagnosis Date    Bradycardia     Cerumen impaction     Depression     Glaucoma     Hypertension     Osteoarthritis        Past Surgical History:   Procedure Laterality Date    APPENDECTOMY      BREAST LUMPECTOMY      HYSTERECTOMY  2004    KNEE SURGERY Left          Family History   Problem Relation Age of Onset    Stroke Father     High Blood Pressure Father     Stroke Brother     High Blood Pressure Brother     Alzheimer's Disease Mother     High Blood Pressure Mother     Diabetes Mother        CareTeam (Including outside providers/suppliers regularly involved in providing care):   Patient Care Team:  Demetrio Williamson MD as PCP - General (Family Medicine)  Demetrio Williamson MD as PCP - [facility] Empaneled Provider    Wt Readings from Last 3 Encounters:   10/01/20 137 lb (62.1 kg)   08/06/19 147 lb (66.7 kg)   02/19/19 151 lb (68.5 kg)     Vitals:    10/01/20 1101   BP: 120/70   Site: Left Upper Arm   Position: Sitting   Cuff Size: Medium Adult   Pulse: 63   Temp: 97.2 °F (36.2 °C)   TempSrc: Temporal   SpO2: 96%   Weight: 137 lb (62.1 kg)   Height: 5' 2\" (1.575 m)     Body mass index is 25.06 kg/m². Based upon direct observation of the patient, evaluation of cognition reveals recent and remote memory intact. Physical Exam  Constitutional:       General: She is not in acute distress. Appearance: She is well-developed. HENT:      Head: Normocephalic and atraumatic. Right Ear: Tympanic membrane, ear canal and external ear normal. There is no impacted cerumen.  Tympanic membrane is not injected or bulging. Left Ear: Tympanic membrane, ear canal and external ear normal. There is no impacted cerumen. Tympanic membrane is not injected or bulging. Nose: Nose normal.      Mouth/Throat:      Mouth: Mucous membranes are moist.      Pharynx: No oropharyngeal exudate or posterior oropharyngeal erythema. Eyes:      General: Lids are normal.         Right eye: No discharge. Left eye: No discharge. Extraocular Movements: Extraocular movements intact. Conjunctiva/sclera: Conjunctivae normal.      Pupils: Pupils are equal, round, and reactive to light. Neck:      Musculoskeletal: Normal range of motion and neck supple. Thyroid: No thyromegaly. Trachea: No tracheal deviation. Cardiovascular:      Rate and Rhythm: Normal rate and regular rhythm. Heart sounds: Normal heart sounds. No murmur. Pulmonary:      Effort: Pulmonary effort is normal. No respiratory distress. Breath sounds: Normal breath sounds. Abdominal:      General: Bowel sounds are normal. There is no distension. Palpations: Abdomen is soft. Tenderness: There is no abdominal tenderness. Musculoskeletal:         General: No swelling. Comments: Normal gait, normal muscle tone    L knee TTP medially. neurovascularly intact. Nl ROM. Lymphadenopathy:      Cervical: No cervical adenopathy. Skin:     General: Skin is warm and dry. Findings: No rash. Neurological:      General: No focal deficit present. Mental Status: She is alert and oriented to person, place, and time. Mental status is at baseline. Cranial Nerves: No cranial nerve deficit. Psychiatric:         Mood and Affect: Mood normal.         Behavior: Behavior normal.         Thought Content: Thought content normal.         Judgment: Judgment normal.           Patient's complete Health Risk Assessment and screening values have been reviewed and are found in Flowsheets.  The following problems were reviewed today and where indicated follow up appointments were made and/or referrals ordered. Positive Risk Factor Screenings with Interventions:     Hearing/Vision:  No exam data present  Hearing/Vision  Do you or your family notice any trouble with your hearing?: (!) Yes  Do you have difficulty driving, watching TV, or doing any of your daily activities because of your eyesight?: No  Have you had an eye exam within the past year?: (!) No  Hearing/Vision Interventions:  · Hearing concerns:  audiology referral provided  · Vision concerns:  patient encouraged to make appointment with his/her eye specialist, ophthalmology/optometry referral provided    Personalized Preventive Plan   Current Health Maintenance Status  Immunization History   Administered Date(s) Administered    Influenza Virus Vaccine 10/14/2014    Influenza, High Dose (Fluzone 65 yrs and older) 11/10/2018    Pneumococcal Conjugate 13-valent ([id number]) 03/24/2016    Pneumococcal Polysaccharide ([id number]) 04/10/2017        Health Maintenance   Topic Date Due    DTaP/Tdap/Td vaccine (1 - Tdap) 03/28/1954    Shingles Vaccine (1 of 2) 03/28/1984    Annual Wellness Visit (AWV)  05/29/2019    Potassium monitoring  06/05/2019    Creatinine monitoring  06/05/2019    Flu vaccine (1) 09/01/2020    Pneumococcal 65+ years Vaccine  Completed    Hepatitis A vaccine  Aged Out    Hepatitis B vaccine  Aged Out    Hib vaccine  Aged Out    Meningococcal (ACWY) vaccine  Aged Out     Recommendations for Qulsar Due: see orders and patient instructions/AVS.  . Recommended screening schedule for the next 5-10 years is provided to the patient in written form: see Patient Rodriguez Thomas was seen today for medicare awv and knee pain.     Diagnoses and all orders for this visit:    Routine general medical examination at a health care facility    Need for prophylactic vaccination against diphtheria-tetanus-pertussis (DTP)  -     Tdap (ADACEL) 5-2-15.5 LF-MCG/0.5 injection; Inject 0.5 mLs into the muscle once for 1 dose    Need for prophylactic vaccination and inoculation against varicella  -     zoster recombinant adjuvanted vaccine (SHINGRIX) 50 MCG/0.5ML SUSR injection; 50 MCG IM then repeat 2-6 months. Glaucoma, unspecified glaucoma type, unspecified laterality  -     Ambulatory referral to Ophthalmology    Essential hypertension  -     lisinopril (PRINIVIL;ZESTRIL) 20 MG tablet; TAKE ONE TABLET BY MOUTH ONCE A DAY  -     hydroCHLOROthiazide (MICROZIDE) 12.5 MG capsule; Take 1 capsule by mouth every morning  -     amLODIPine (NORVASC) 10 MG tablet; Take 1 tablet by mouth daily  -     Basic Metabolic Panel; Future    Depression, unspecified depression type  -     citalopram (CELEXA) 20 MG tablet; TAKE ONE TABLET BY MOUTH DAILY    Chronic pain of left knee  -     XR KNEE LEFT (3 VIEWS); Future    Other orders  -     INFLUENZA, QUADV, ADJUVANTED, 72 YRS =, IM, PF, PREFILL SYR, 0.5ML (FLUAD)          HTN:  Tolerating medications well and taking them as directed. Bp at home is about 126/80. The highest at home is about 138. She denies any low blood pressures. No symptoms concerning for end organ damage are present. BMP today. Try going from lisinopril 20mg BID to once a day as bp running lower end of normal consistently. Monitor and let me know if there are any issues. L knee pain:  Present for a year. Getting worse. No recent injury. Bothers her when she sleeps primarily. She would like to have an xray. Discussed PT and/or steroid shot as possible options. She would like to wait.       Major depression: Stable on celexa.      Glaucoma: The patient sees an eye doctor regularly. Would like a new eye doctor.        Osteoarthritis:  Taking aleve BID. Drinking milk. She does not want to take the omeprazole. Discussed risks of ulcer and GI bleed.   Patient demonstrates understanding and and adamant that she needs to take this twice a day despite the risks. She reports Tylenol does not help. She does not care to try any other additional treatments. Discussed increased risk of bleed especially while she is taking Celexa. She still prefers to continue the Aleve and Celexa.            SH: lives alone. Daughter is in [place]. Sees her a lot. Hx of living at [place] [id number] when second  was alive. Had 6 children in 8 years. 1  of AIDS and another of cancer.       RTC 3 mo for BP

## 2025-05-19 NOTE — TELEPHONE ENCOUNTER
Spoke with patient.  He notes worsening constant abdominal pain since Friday. Not worse with meals or BMs---just constant.  No vomiting, but extreme nausea.  He describes abdominal pain as a constant ache with shooting pains and cramping.  Last bM was today. Stool has been liquid since Friday---30+ daily.   He states he is not taking imodium---he is taking what dr bassett prescribed 3 at a time. No imodium or lomotil on file. He does not know the name of the medication right now.  He started having fever on last Sunday night---for which he took a dose of liquid tylenol---then woke with fever today--so took another does. Temp now is 102. No chills.  Since Saturday he is having right calf pain which he describes as shooting. No swelling, redness, tenderness, warmth.      Message routed to dr bassett (start off with xray?)

## 2025-05-19 NOTE — PROGRESS NOTES
Advised patient to go to ER for IV antibiotics due to possible c.diff colitis as recent XR shows enteritis versus early SBO. Patient with fevers from 100.4-102.9 within the last 2 days. Has been taking Tylenol, with last dose being at 4pm today in infusion center. He got 1L NS and 8 mg zofran prior to going to ER. Patient was also scheduled for RLE US to r/o DVT due to right LE shooting pain but will not be able to make appt so can be performed inpt.    Karli Campo, MARQUISP-C  Hematology and Medical Oncology  [facility]  [facility] [place] [facility]

## 2025-05-20 PROBLEM — R50.9 FEBRILE ILLNESS, ACUTE: Status: ACTIVE | Noted: 2025-05-20

## 2025-05-20 PROBLEM — A04.72 C. DIFFICILE COLITIS: Status: ACTIVE | Noted: 2025-05-20

## 2025-05-20 PROBLEM — E87.8 ELECTROLYTE ABNORMALITY: Status: ACTIVE | Noted: 2025-05-20

## 2025-05-20 PROBLEM — Z79.69 ON ANTINEOPLASTIC CHEMOTHERAPY: Status: ACTIVE | Noted: 2025-05-20

## 2025-05-20 PROBLEM — E44.1 MILD MALNUTRITION: Status: ACTIVE | Noted: 2025-05-20

## 2025-05-20 PROBLEM — R10.84 GENERALIZED ABDOMINAL PAIN: Status: ACTIVE | Noted: 2025-05-20

## 2025-05-23 ENCOUNTER — TELEPHONE (OUTPATIENT)
Dept: PAIN MEDICINE | Facility: CLINIC | Age: 45
End: 2025-05-23
Payer: COMMERCIAL

## 2025-05-26 ENCOUNTER — OFFICE VISIT (OUTPATIENT)
Dept: HEMATOLOGY/ONCOLOGY | Facility: CLINIC | Age: 45
End: 2025-05-26
Payer: COMMERCIAL

## 2025-05-26 DIAGNOSIS — C18.7 MALIGNANT NEOPLASM OF SIGMOID COLON: Primary | ICD-10-CM

## 2025-05-26 DIAGNOSIS — C77.9 SECONDARY ADENOCARCINOMA OF LYMPH NODE: ICD-10-CM

## 2025-05-26 DIAGNOSIS — A04.72 C. DIFFICILE COLITIS: ICD-10-CM

## 2025-05-26 PROCEDURE — 98007 SYNCH AUDIO-VIDEO EST HI 40: CPT | Mod: 95,,, | Performed by: INTERNAL MEDICINE

## 2025-05-26 PROCEDURE — 1159F MED LIST DOCD IN RCRD: CPT | Mod: CPTII,95,, | Performed by: INTERNAL MEDICINE

## 2025-05-26 PROCEDURE — G2211 COMPLEX E/M VISIT ADD ON: HCPCS | Mod: 95,,, | Performed by: INTERNAL MEDICINE

## 2025-05-26 RX ORDER — HEPARIN 100 UNIT/ML
500 SYRINGE INTRAVENOUS
Status: CANCELLED | OUTPATIENT
Start: 2025-05-30

## 2025-05-26 RX ORDER — DIPHENHYDRAMINE HYDROCHLORIDE 50 MG/ML
50 INJECTION, SOLUTION INTRAMUSCULAR; INTRAVENOUS ONCE AS NEEDED
Status: CANCELLED | OUTPATIENT
Start: 2025-05-30

## 2025-05-26 RX ORDER — SODIUM CHLORIDE 0.9 % (FLUSH) 0.9 %
10 SYRINGE (ML) INJECTION
Status: CANCELLED | OUTPATIENT
Start: 2025-05-30

## 2025-05-26 RX ORDER — PROCHLORPERAZINE EDISYLATE 5 MG/ML
5 INJECTION INTRAMUSCULAR; INTRAVENOUS ONCE AS NEEDED
Status: CANCELLED
Start: 2025-05-30

## 2025-05-26 RX ORDER — EPINEPHRINE 0.3 MG/.3ML
0.3 INJECTION SUBCUTANEOUS ONCE AS NEEDED
Status: CANCELLED | OUTPATIENT
Start: 2025-05-30

## 2025-05-26 NOTE — PROGRESS NOTES
Subjective     The patient location is:  Home  The chief complaint leading to consultation is:  Colon cancer for adjuvant chemotherapy    Visit type: audiovisual    Notes:    Patient ID: Mason Rodriguez is a 44 y.o. male.    Chief Complaint: Adenocarcinoma of sigmoid colon  Mr. Rodriguez is a 44-year-old male who underwent a screening colonoscopy on 11/26/2024 and was noted to have a 17 mm pedunculated polyp in the sigmoid colon, pathology revealed adenocarcinoma arising in a tubular adenoma.    On 01/28/2025 he underwent robotic sigmoid colectomy  Pathology revealed metastatic adenocarcinoma in the sigmoid colon in 1 of the 13  lymph nodes ( see addendum below) .   PROCEDURE: Sigmoidectomy   TUMOR SITE: Sigmoid colon   HISTOLOGIC TYPE: Adenocarcinoma arising in a tubular adenoma   HISTOLOGIC GRADE: Moderately differentiated   TUMOR SIZE: Polyp largely replaced by invasive moderately differentiated    adenocarcinoma   MULTIPLE PRIMARY SITES: Negative   TUMOR EXTENT: Extending through muscularis mucosa into submucosa   SUB-MUCOSAL INVASION: Minimal (polypectomy margin only)   LYMPHATIC AND/OR VASCULAR INVASION: Negative   PERINEURAL INVASION: Negative   TUMOR BUDDING SCORE: Negative   MARGIN STATUS FOR INVASIVE CARCINOMA: Negative   MARGIN STATUS FOR NON-INVASIVE TUMOR: Negative   DNA MISMATCH REPAIR STATUS (MMR): Pending on bishop metastasis.   REGIONAL LYMPH NODE STATUS:   NUMBER OF LYMPH NODES WITH TUMOR: At least one (1)   NUMBER OF LYMPH NODES EXAMINED: Two (2) - additional pending   TUMOR DEPOSITS: Pending   PATHOLOGIC STAGE CLASSIFICATION: pT1 pN pending, at least N1a   The addtional sections described below yield eleven additional lymph    nodes, all negative for tumor. The final node assessment is metastatic    carcinoma in one of thirteen lymph nodes (1/13) with the final bishop    stage pN1a.   Immunohistochemistry (IHC) Results for Mismatch Repair (MMR) Proteins WAYNE    Off note he had a PET scan on 12/20/2024  "which revealed "Hypermetabolic nodule or node posterior to the right hilum with adjacent consolidation.  The overall appearance on CT is more suggestive of an infectious/inflammatory process than metastatic malignancy, but close follow-up will be necessary to rule out a malignant etiology.  Alternatively, further evaluation with EBUS could be pursued. Activity near the rectosigmoid junction may correspond to the site of malignant polyp resection with differential possibilities as outlined above.  No metastatic disease to lymph nodes or liver"     He underwent EBUS on 1/3/2025 and pathology revealed RIGHT LOWER LOBE LUNG NODULE FINE NEEDLE ASPIRATE. NEGATIVE FOR MALIGNANT CELLS. NON-CASEATING GRANULOMA/GIANT CELLS PRESENT"     He started  adjuvant chemo with XELOX on 3/13/2205 and cycle 2 was on 4/3/2025      HPI he comes in for cycle 3 of adjuvant chemotherapy with XELOX     He was recently seen in the clinic on 04/22/2025 secondary to multiple episodes of diarrhea vomiting pain and burning at the tip of his penis.  CT scans revealed proximal sigmoid colon with some surrounding pericolonic inflammation and mild wall thickening of a portion of the mid sigmoid colon could reflect an element of mild sigmoid colitis.  Stool studies were negative.  E scribed Cipro Flagyl and Lomotil and advised him to let me know if symptoms do not improve  He was also having some redness and swelling of his penis this was a virtual visit but was able to see the examined part, discussed with Urology and they recommended Lotrisone and if it does not resolve referred to Dermatology          HPIHe was recently diagnosed with C.Diff infection on 5/19/2025 and started on oral vancomycin. His diarrhea has resolved    Review of Systems   Constitutional:  Negative for appetite change, fatigue and unexpected weight change.   HENT:  Negative for mouth sores.    Eyes:  Negative for visual disturbance.   Respiratory:  Negative for cough and " shortness of breath.    Cardiovascular:  Negative for chest pain.   Gastrointestinal:  Negative for abdominal pain and diarrhea.   Genitourinary:  Negative for frequency.   Musculoskeletal:  Negative for back pain.   Integumentary:  Negative for rash.   Neurological:  Negative for headaches.   Hematological:  Negative for adenopathy.   Psychiatric/Behavioral:  The patient is not nervous/anxious.    All other systems reviewed and are negative.         Objective     Physical Exam         LABS:  WBC   Date Value Ref Range Status   05/22/2025 5.62 3.90 - 12.70 K/uL Final   05/19/2025 6.05 3.90 - 12.70 K/uL Final     Hemoglobin   Date Value Ref Range Status   05/22/2025 12.7 (L) 14.0 - 18.0 g/dL Final     Hematocrit   Date Value Ref Range Status   05/22/2025 36.3 (L) 40.0 - 54.0 % Final     Platelets   Date Value Ref Range Status   05/22/2025 210 150 - 450 K/uL Final     Gran # (ANC)   Date Value Ref Range Status   05/22/2025 2.8 1.8 - 7.7 K/uL Final     Gran %   Date Value Ref Range Status   05/22/2025 49.0 38.0 - 73.0 % Final       Chemistry        Component Value Date/Time     05/22/2025 0358    K 4.2 05/22/2025 0358     (H) 05/22/2025 0358    CO2 25 05/22/2025 0358    BUN 5 (L) 05/22/2025 0358    BUN 17 05/19/2025 1401    CREATININE 0.75 05/22/2025 0358    CREATININE 1.0 05/19/2025 1401    GLU 99 05/22/2025 0358        Component Value Date/Time    CALCIUM 8.5 (L) 05/22/2025 0358    ALKPHOS 41 05/22/2025 0358    AST 27 05/22/2025 0358    ALT 13 05/22/2025 0358    BILITOT 0.4 05/22/2025 0358    ESTGFRAFRICA >60 08/13/2020 0849    EGFRNONAA >60 08/13/2020 0849          Assessment and Plan     1. Malignant neoplasm of sigmoid colon    2. Secondary adenocarcinoma of lymph node    3. C. difficile colitis    Other orders  -     palonosetron (ALOXI) 0.25 mg with Dexamethasone (DECADRON) 12 mg in NS 50 mL IVPB  -     oxaliplatin (ELOXATIN) 130 mg/m2 = 308 mg in D5W 561.6 mL chemo infusion  -     prochlorperazine  injection Soln 5 mg  -     EPINEPHrine (EPIPEN) 0.3 mg/0.3 mL pen injection 0.3 mg  -     diphenhydrAMINE injection 50 mg  -     hydrocortisone sodium succinate injection 100 mg  -     D5W 250 mL flush bag  -     sodium chloride 0.9% flush 10 mL  -     heparin, porcine (PF) 100 unit/mL injection flush 500 Units  -     alteplase injection 2 mg        Route Chart for Scheduling    Med Onc Chart Routing      Follow up with physician . Brittany patient. Schedule Oxaliplatin on 5/30/2025. Then in 3 months schedule CBC, CMP, CEA, CT chest, abdomen/pelvis and see me   Follow up with YOSEPH    Infusion scheduling note    Injection scheduling note    Labs    Imaging    Pharmacy appointment    Other referrals                Treatment Plan Information   OP COLORECTAL CAPECITABINE OXALIPLATIN Edel Lerner MD   Associated diagnosis: Secondary adenocarcinoma of lymph node   noted on 2/11/2025  Associated diagnosis: Malignant neoplasm of sigmoid colon Stage IIIA pT2, pN1, cM0 noted on 1/2/2025   Line of treatment: Adjuvant  Treatment Goal: Curative     Upcoming Treatment Dates - OP COLORECTAL CAPECITABINE OXALIPLATIN    5/23/2025       Pre-Medications       palonosetron 0.25mg/dexAMETHasone 12mg in NS IVPB 0.25 mg 50 mL       Chemotherapy       oxaliplatin (ELOXATIN) 130 mg/m2 = 308 mg in D5W 561.6 mL chemo infusion    Supportive Plan Information  IV FLUIDS AND ELECTROLYTES VIVIANE Horvath   Associated Diagnosis: Diarrhea of presumed infectious origin   noted on 5/19/2025   Line of treatment: Supportive Care   Treatment goal: Supportive     Upcoming Treatment Dates - IV FLUIDS AND ELECTROLYTES    No upcoming days in selected categories.           Mr. Rodriguez comes in to review his labs post hospitalization for C diff colitis.  He is now on oral vancomycin since 05/22/2025 with resolution of his diarrhea.  Discussed importance of completing adjuvant curative chemotherapy.  He is agreeable will go ahead and treat on 05/30/2025  with labs prior.  Since that his last cycle of chemo I will plan on seeing him back in 3 months with repeat labs and CT scans    He will be due for a colonoscopy in November 2025      Visit today included increased complexity associated with the care of the episodic problem adjuvant chemotherapy addressed and managing the longitudinal care of the patient due to the serious and/or complex managed problem(s) colon cancer    Above plan reviewed with the patient and all questions were answered to his satisfaction

## 2025-05-27 ENCOUNTER — TELEPHONE (OUTPATIENT)
Dept: HEMATOLOGY/ONCOLOGY | Facility: CLINIC | Age: 45
End: 2025-05-27
Payer: COMMERCIAL

## 2025-05-27 DIAGNOSIS — C18.7 MALIGNANT NEOPLASM OF SIGMOID COLON: Primary | ICD-10-CM

## 2025-05-27 NOTE — TELEPHONE ENCOUNTER
----- Message from Med Assistant Flores sent at 5/27/2025 10:12 AM CDT -----  Regarding: RE: scheduling chemo  Please call the patient and let him know what time he is scheduled for labs and treatmentBrittney Salinas  ----- Message -----  From: Kendall Grimes  Sent: 5/27/2025  10:09 AM CDT  To: [facility] Clinical Support Staff  Subject: scheduling chemo                                 Good Morning,Can someone please schedule this patient for Oxaliplatin on 5/30/2025 after his labs. Per  request?Thanks

## 2025-05-28 ENCOUNTER — TELEPHONE (OUTPATIENT)
Dept: HEMATOLOGY/ONCOLOGY | Facility: CLINIC | Age: 45
End: 2025-05-28
Payer: COMMERCIAL

## 2025-05-28 ENCOUNTER — OFFICE VISIT (OUTPATIENT)
Dept: PAIN MEDICINE | Facility: CLINIC | Age: 45
End: 2025-05-28
Payer: COMMERCIAL

## 2025-05-28 VITALS
DIASTOLIC BLOOD PRESSURE: 71 MMHG | BODY MASS INDEX: 29.58 KG/M2 | SYSTOLIC BLOOD PRESSURE: 107 MMHG | WEIGHT: 230.38 LBS | HEART RATE: 73 BPM

## 2025-05-28 DIAGNOSIS — Z79.891 OPIOID CONTRACT EXISTS: ICD-10-CM

## 2025-05-28 DIAGNOSIS — M54.16 LUMBAR RADICULOPATHY: Primary | ICD-10-CM

## 2025-05-28 DIAGNOSIS — C18.7 ADENOCARCINOMA OF SIGMOID COLON: ICD-10-CM

## 2025-05-28 DIAGNOSIS — C77.9 SECONDARY ADENOCARCINOMA OF LYMPH NODE: ICD-10-CM

## 2025-05-28 DIAGNOSIS — M54.16 LUMBAR RADICULOPATHY: ICD-10-CM

## 2025-05-28 DIAGNOSIS — R59.0 MEDIASTINAL ADENOPATHY: ICD-10-CM

## 2025-05-28 PROCEDURE — 1111F DSCHRG MED/CURRENT MED MERGE: CPT | Mod: CPTII,S$GLB,,

## 2025-05-28 PROCEDURE — 3074F SYST BP LT 130 MM HG: CPT | Mod: CPTII,S$GLB,,

## 2025-05-28 PROCEDURE — 3078F DIAST BP <80 MM HG: CPT | Mod: CPTII,S$GLB,,

## 2025-05-28 PROCEDURE — 1159F MED LIST DOCD IN RCRD: CPT | Mod: CPTII,S$GLB,,

## 2025-05-28 PROCEDURE — 3008F BODY MASS INDEX DOCD: CPT | Mod: CPTII,S$GLB,,

## 2025-05-28 PROCEDURE — 99214 OFFICE O/P EST MOD 30 MIN: CPT | Mod: S$GLB,,,

## 2025-05-28 PROCEDURE — 99999 PR PBB SHADOW E&M-EST. PATIENT-LVL III: CPT | Mod: PBBFAC,,,

## 2025-05-28 NOTE — TELEPHONE ENCOUNTER
"----- Message from Virgilio sent at 5/28/2025 12:40 PM CDT -----  Consult/AdvisoryName Of Caller: Karli Rodriguez (Spouse)Contact Preference?: [phone] Provider Name: Niall patient feel the need to be seen today? NoWhat is the nature of the call?: Calling to discuss status of pt's insurance paperworkAdditional Notes:"Thank you for all that you do for our patients"  "

## 2025-05-28 NOTE — PROGRESS NOTES
This note was completed with dictation software and grammatical errors may exist.    Chief Complaint   Patient presents with    Back Pain        HPI: Mason Rodriguez is a 44 y.o. year old male patient who has a past medical history of Cancer, Cervical disc disease, Disc degeneration, lumbar, Disc disease, degenerative, cervical, and Lumbar disc disease. He presents in referral from No ref. provider found for back and neck pain.  He returns for follow-up with continued lower back pain with radiation down bilateral legs, 7/10, constant.  He reports continued numbness and tingling as well.  He is currently in chemotherapy for his colon cancer.  He was recently hospitalized for C diff but has been discharged in his feeling better.  He denies any new or worsening numbness or any bladimir bowel or bladder incontinence.  He is tolerating the increase of hydrocodone 5/325 mg t.i.d. to hydrocodone 7.5/325 mg t.i.d..  It is providing some benefit and he denies any ill side effects or adverse events with his medication.        Initial history: The patient reports that he 1st injured his neck in 2006, this pain is located the base of his neck but feels that at this time it is tolerable and it is his back that is bothering him the most. He states that he has had back pain for over 10 years, states that it is hereditary.He has seen other specialists and has had several MRIs over the years.  He has done physical therapy numerous times.  While the pain has been severe, it has especially worsened in the last year and especially since this past October.  He states that the pain is across the bilateral low back and radiates into the posterior thighs, calves and into the feet.  It can be either leg.  States that he is fine when he is sitting but as soon as he starts to stand up and walk the pain becomes severe.  States that if he leans forward on a shopping cart he gets relief.  He denies any bladimir weakness, no bowel or bladder  incontinence.  He states that he can only stand for about 5 minutes before the pain becomes severe and he needs to sit down.    Pain intervention history:  He had done what sounds like lumbar medial branch blocks in the past without relief.He is status post L5/S1 interlaminar epidural steroid injection on 12/21/2022 with 0% relief.  S/p bilateral L3/4 transforaminal injection on 02/03/2023 with only about a day of moderate relief at best. He is status post bilateral L5/S1 EVONNE on 05/26/2023 with 0% relief.    Spine surgeries:    Antineuropathics:  Has tried gabapentin in the past without any relief.  NSAIDs:  Mobic 15 mg, ibuprofen 1200 mg at a time   Physical therapy:  Has done multiple rounds of physical therapy in the past without long term relief.  Antidepressants:  Cymbalta 30  Muscle relaxers:  Opioids:  Antiplatelets/Anticoagulants:    ROS:  Reports back pain, difficulty sleeping and anxiety.  Balance of review of systems is negative.    Lab Results   Component Value Date    HGBA1C 5.5 10/22/2024       Lab Results   Component Value Date    WBC 5.62 05/22/2025    HGB 12.7 (L) 05/22/2025    HCT 36.3 (L) 05/22/2025    MCV 91 05/22/2025     05/22/2025             Past Medical History:   Diagnosis Date    Cancer     sigmoid cancer    Cervical disc disease     Disc degeneration, lumbar     Disc disease, degenerative, cervical     Lumbar disc disease        Past Surgical History:   Procedure Laterality Date    cervical rhizotomy      COLON SURGERY      CYSTOSCOPY W/ URETERAL STENT PLACEMENT Left 01/28/2025    Procedure: CYSTOSCOPY, WITH URETERAL STENT INSERTION;  Surgeon: Vj Madden MD;  Location: [facility] OR;  Service: Urology;  Laterality: Left;    DV5 ROBOTIC COLECTOMY, SIGMOID N/A 01/28/2025    Procedure: DV5 ROBOTIC COLECTOMY, SIGMOID;  Surgeon: Mirza Golden MD;  Location: [facility] OR;  Service: General;  Laterality: N/A;  stents w/ sleeper or purhoit    ENDOBRONCHIAL ULTRASOUND Bilateral 01/03/2025     Procedure: ENDOBRONCHIAL ULTRASOUND (EBUS);  Surgeon: Jay Byrnes MD;  Location: [facility] OR;  Service: Pulmonary;  Laterality: Bilateral;    EPIDURAL STEROID INJECTION INTO LUMBAR SPINE N/A 12/21/2022    Procedure: Injection-steroid-epidural-lumbar L5/S1;  Surgeon: Vipin Cho MD;  Location: [facility] OR;  Service: Pain Management;  Laterality: N/A;    INSERTION OF TUNNELED CENTRAL VENOUS CATHETER (CVC) WITH SUBCUTANEOUS PORT Left 2/20/2025    Procedure: [id number];  Surgeon: Mirza Golden MD;  Location: [facility];  Service: General;  Laterality: Left;  Left Subclavian    TRANSFORAMINAL EPIDURAL INJECTION OF STEROID Bilateral 02/03/2023    Procedure: Injection,steroid,epidural,transforaminal approach L3/4;  Surgeon: Vipin Cho MD;  Location: [facility];  Service: Pain Management;  Laterality: Bilateral;    TRANSFORAMINAL EPIDURAL INJECTION OF STEROID Bilateral 05/26/2023    Procedure: Injection,steroid,epidural,transforaminal approach L5/S1 Bilat;  Surgeon: Vipin Cho MD;  Location: [facility];  Service: Pain Management;  Laterality: Bilateral;  L5/S1 Bilat       Social History     Socioeconomic History    Marital status:     Number of children: 4   Occupational History    Occupation: supervisor      Comment: [employer]   Tobacco Use    Smoking status: Never     Passive exposure: Past    Smokeless tobacco: Never   Substance and Sexual Activity    Alcohol use: Not Currently    Drug use: No    Sexual activity: Yes     Partners: Female   Social History Narrative    ** Merged History Encounter **          Social Drivers of Health     Financial Resource Strain: Low Risk  (5/20/2025)    Overall Financial Resource Strain (CARDIA)     Difficulty of Paying Living Expenses: Not very hard   Food Insecurity: No Food Insecurity (5/20/2025)    Hunger Vital Sign     Worried About Running Out of Food in the Last Year: Never true     Ran Out of Food in the Last Year: Never true    Transportation Needs: No Transportation Needs (5/20/2025)    PRAPARE - Transportation     Lack of Transportation (Medical): No     Lack of Transportation (Non-Medical): No   Physical Activity: Sufficiently Active (2/21/2025)    Exercise Vital Sign     Days of Exercise per Week: 5 days     Minutes of Exercise per Session: 40 min   Stress: Stress Concern Present (2/21/2025)    [employer] - Occupational Stress Questionnaire     Feeling of Stress : Rather much   Housing Stability: Low Risk  (5/20/2025)    Housing Stability Vital Sign     Unable to Pay for Housing in the Last Year: No     Number of Times Moved in the Last Year: 0     Homeless in the Last Year: No   Recent Concern: Housing Stability - High Risk (2/21/2025)    Housing Stability Vital Sign     Unable to Pay for Housing in the Last Year: No     Number of Times Moved in the Last Year: 3     Homeless in the Last Year: No         Medications/Allergies: See med card    Vitals:    05/28/25 1515   BP: 107/71   Pulse: 73   Weight: 104.5 kg (230 lb 6.1 oz)   PainSc:   7   PainLoc: Back       Body mass index is 29.58 kg/m².        5/28/2025     3:15 PM 2/19/2025     7:26 AM 11/19/2024     7:48 AM   Last 3 PDI Scores   Pain Disability Index (PDI) 51 45 46         Physical exam:  Gen: A and O x3, pleasant, well-groomed  Skin: No rashes or obvious lesions  HEENT: PERRLA, no obvious deformities on ears or in canals. Trachea midline.  CVS: Regular rate and rhythm, normal palpable pulses.  Resp:No increased work of breathing, symmetrical chest rise.  Abdomen: Soft, NT/ND.  Musculoskeletal: No antalgic gait.     Neuro:    Iliopsoas Quadriceps Knee  Flexion Tibialis  anterior Gastro- cnemius EHL   Lower: R 5/5 5/5 5/5 5/5 5/5 5/5    L 5/5 5/5 5/5 5/5 5/5 5/5      Left  Right    Patellar DTR 1+ 1+   Achilles DTR 0+ 0+                    Sensation: intact to lt touch bilaterally in c4-t1   Reflexes: 2+ b/l Bicep, tricep, BR   ROM: Cervical ROM  full, shoulder, elbow and wrist ROM full  Tone:  Normal at elbow, wrist and shoulder   Inspection: no atrophy of bicep, FDI or APB noted  Palpation: tender cervical paraspinals, levator scapula and trapezius     Motor:      Right Left   C4 Shoulder Abduction  5  5   C5 Elbow Flexion    5  5   C6 Wrist Extension  5  5   C7 Elbow Extension   5  5   C8/T1 Hand Intrinsics   5  5                              Intact and symmetrical to light touch and pinprick in L1-S1 dermatomes bilaterally.    Lumbar spine:  Lumbar spine: ROM is mildly reduced with flexion with slight increased pain, moderately reduced with extension with increased pain  Pedro's test causes no increased pain on either side.    Supine straight leg raise is negative bilaterally.    Internal and external rotation of the hip causes no increased pain on either side.  Myofascial exam: No tenderness to palpation across lumbar paraspinous muscles.      Imagin18 Xray L-spine:  Trace retrolisthesis of L4 on L5.  Alignment is otherwise within normal limits.  Vertebral body heights are maintained.  No acute fracture identified.  No high-grade osseous foraminal narrowing on oblique views.  Minor multilevel endplate changes.  Disc spaces are preserved.  No prevertebral soft tissue abnormality.    11 MRI C-spine:    1. MINIMAL C3-4 DISC BULGE WITH SEVERE LEFT AND MODERATE RIGHT FORAMINAL STENOSIS.     2. SEVERE BILATERAL FORAMINAL STENOSIS AT THE C4-5 LEVEL WHICH IS GREATER ON THE RIGHT THAN ON THE LEFT.     3. C5-6 MODERATE BILATERAL FORAMINAL STENOSIS.     4. MINIMAL C6-7 DISC BULGE WITH SEVERE LEFT AND MODERATE RIGHT FORAMINAL STENOSIS.     5. MILD BILATERAL FORAMINAL STENOSIS AT THE C7-T1 LEVEL.       X-ray lumbar spine 22:  There is grade 1 retrolisthesis of L2 on L3, L3 on L4 and L4 on L5.    MRI lumbar spine 2022:  Report only.  The lumbar spine canal demonstrates narrowed appearance likely on a developmental basis.  There is prominent  epidural fat throughout the lumbar spine.  L1/2 no canal stenosis.  L2/3 there is mild disc desiccation and disc bulging, mild facet arthrosis.  There is mild bilateral neuroforaminal narrowing at this level.  At L3/4 there is a moderate broad-based disc bulge with mild-to-moderate bilateral facet arthrosis.  There is also slightly prominent epidural fat which contributes to thecal sac narrowing.  There is mild bilateral foraminal narrowing and 6-7 millimeter canal.  At L4/5 there is broad-based disc bulge ligamentum hypertrophy, prominent epidural fat and moderate bilateral facet arthropathy.  The AP diameter the central thecal sac is 8 millimeters.  There is moderate lateral recess narrowing and left greater than right neural foraminal narrowing.  At L5/S1 there is disc space narrowing, disc bulging superimposed left paracentral annular fissure, ligamentous hypertrophy and severe left greater than right facet hypertrophy.    MRI lumbar spine 04/18/2023  FINDINGS:  Alignment: Normal.     Vertebral column: Vertebral body heights are maintained.  No evidence of an acute fracture or aggressive marrow replacement process. Spinal canal appears diffusely small in caliber, likely on a developmental basis. Epic lipomatosis, most pronounced at L2-L4.  Multilevel disc degeneration, most pronounced at L5-S1.     Cord: Normal.  Conus terminates at L1.     Degenerative findings:     T12-L1: Disc is normal configuration.  Mild bilateral facet arthropathy.  No neural foraminal or spinal canal stenosis.  L1-L2: Disc is normal in configuration.  Mild bilateral facet arthropathy and ligamentum flavum thickening.  There is no neural foraminal stenosis.  There is no spinal canal stenosis.  L2-L3: Mild diffuse disc bulge.  Mild bilateral facet arthropathy and ligamentum flavum thickening.  Epidural lipomatosis.  Mild bilateral neural foraminal stenosis.  Moderate spinal canal stenosis.  L3-L4: Diffuse disc bulge.  Mild-to-moderate  bilateral facet arthropathy.  Epidural lipomatosis.  Mild bilateral neural foraminal stenosis.  Severe spinal canal stenosis.  L4-L5: Diffuse disc bulge with small central disc protrusion.  Moderate bilateral facet arthropathy.  Ligamentum flavum thickening.  Epidural lipomatosis.  Moderate left and mild right neural foraminal stenosis.  Moderate spinal canal stenosis.  L5-S1: Diffuse disc bulge.  Moderate left greater than right facet arthropathy.  Ligamentum flavum thickening.  Severe left and moderate right neural foraminal stenosis.  There is no spinal canal stenosis.    Assessment:  Mason Rodriguez is a 43 y.o. year old male patient who has a past medical history of Cervical disc disease, Disc degeneration, lumbar, Disc disease, degenerative, cervical, Hypertension, and Lumbar disc disease. He presents in referral from Dr. Marichuy Albert for back and neck pain.     1. Lumbar radiculopathy        2. Opioid contract exists        3. Mediastinal adenopathy        4. Adenocarcinoma of sigmoid colon        5. Secondary adenocarcinoma of lymph node                    Plan:    Continues to have significant lumbar radicular pain however at this time he would like to focus on his chemotherapy for his colon cancer.  He reports 1 more treatment remaining.  Unfortunately he has failed to find significant relief in the past with lumbar epidurals.  For now we will continue to maximizing conservative management.  Refills for hydrocodone 7.5/325 mg 90 tablets to take up to 3 times daily sent to Dr. Cho today for approval. I have reviewed the [place] Board of Pharmacy website and there are no abberancies.    Follow up in 3 months or sooner if needed.

## 2025-05-28 NOTE — TELEPHONE ENCOUNTER
I have reviewed the [place] Board of Pharmacy website and there are no abberancies.      He did receive some oxycodone from oncology recently.

## 2025-05-29 RX ORDER — HYDROCODONE BITARTRATE AND ACETAMINOPHEN 7.5; 325 MG/1; MG/1
1 TABLET ORAL EVERY 8 HOURS PRN
Qty: 90 TABLET | Refills: 0 | Status: SHIPPED | OUTPATIENT
Start: 2025-07-11 | End: 2025-08-10

## 2025-05-29 RX ORDER — HYDROCODONE BITARTRATE AND ACETAMINOPHEN 7.5; 325 MG/1; MG/1
1 TABLET ORAL EVERY 8 HOURS PRN
Qty: 90 TABLET | Refills: 0 | Status: SHIPPED | OUTPATIENT
Start: 2025-06-11 | End: 2025-07-11

## 2025-05-29 RX ORDER — HYDROCODONE BITARTRATE AND ACETAMINOPHEN 7.5; 325 MG/1; MG/1
1 TABLET ORAL EVERY 8 HOURS PRN
Qty: 90 TABLET | Refills: 0 | Status: SHIPPED | OUTPATIENT
Start: 2025-08-10 | End: 2025-09-09

## 2025-05-30 ENCOUNTER — INFUSION (OUTPATIENT)
Dept: INFUSION THERAPY | Facility: HOSPITAL | Age: 45
End: 2025-05-30
Attending: INTERNAL MEDICINE
Payer: COMMERCIAL

## 2025-05-30 ENCOUNTER — LAB VISIT (OUTPATIENT)
Dept: LAB | Facility: HOSPITAL | Age: 45
End: 2025-05-30
Attending: INTERNAL MEDICINE
Payer: COMMERCIAL

## 2025-05-30 ENCOUNTER — DOCUMENTATION ONLY (OUTPATIENT)
Dept: INFUSION THERAPY | Facility: HOSPITAL | Age: 45
End: 2025-05-30

## 2025-05-30 ENCOUNTER — DOCUMENTATION ONLY (OUTPATIENT)
Dept: INFUSION THERAPY | Facility: HOSPITAL | Age: 45
End: 2025-05-30
Payer: COMMERCIAL

## 2025-05-30 VITALS
DIASTOLIC BLOOD PRESSURE: 75 MMHG | BODY MASS INDEX: 28.07 KG/M2 | OXYGEN SATURATION: 97 % | RESPIRATION RATE: 20 BRPM | TEMPERATURE: 98 F | WEIGHT: 218.69 LBS | HEART RATE: 70 BPM | SYSTOLIC BLOOD PRESSURE: 115 MMHG | HEIGHT: 74 IN

## 2025-05-30 DIAGNOSIS — C18.7 MALIGNANT NEOPLASM OF SIGMOID COLON: ICD-10-CM

## 2025-05-30 DIAGNOSIS — C77.9 SECONDARY ADENOCARCINOMA OF LYMPH NODE: Primary | ICD-10-CM

## 2025-05-30 LAB
ABSOLUTE NEUTROPHIL COUNT (OHS): 3.81 K/UL (ref 1.8–7.7)
ALBUMIN SERPL BCP-MCNC: 3.8 G/DL (ref 3.5–5.2)
ALP SERPL-CCNC: 67 UNIT/L (ref 40–150)
ALT SERPL W/O P-5'-P-CCNC: 46 UNIT/L (ref 10–44)
ANION GAP (OHS): 10 MMOL/L (ref 8–16)
AST SERPL-CCNC: 38 UNIT/L (ref 11–45)
BILIRUB SERPL-MCNC: 0.5 MG/DL (ref 0.1–1)
BUN SERPL-MCNC: 19 MG/DL (ref 6–20)
CALCIUM SERPL-MCNC: 9.4 MG/DL (ref 8.7–10.5)
CHLORIDE SERPL-SCNC: 105 MMOL/L (ref 95–110)
CO2 SERPL-SCNC: 25 MMOL/L (ref 23–29)
CREAT SERPL-MCNC: 1.1 MG/DL (ref 0.5–1.4)
ERYTHROCYTE [DISTWIDTH] IN BLOOD BY AUTOMATED COUNT: 17.2 % (ref 11.5–14.5)
GFR SERPLBLD CREATININE-BSD FMLA CKD-EPI: >60 ML/MIN/1.73/M2
GLUCOSE SERPL-MCNC: 152 MG/DL (ref 70–110)
HCT VFR BLD AUTO: 41.2 % (ref 40–54)
HGB BLD-MCNC: 14.6 GM/DL (ref 14–18)
IMM GRANULOCYTES # BLD AUTO: 0.03 K/UL (ref 0–0.04)
MCH RBC QN AUTO: 32.8 PG (ref 27–31)
MCHC RBC AUTO-ENTMCNC: 35.4 G/DL (ref 32–36)
MCV RBC AUTO: 93 FL (ref 82–98)
PLATELET # BLD AUTO: 213 K/UL (ref 150–450)
PMV BLD AUTO: 9 FL (ref 9.2–12.9)
POTASSIUM SERPL-SCNC: 4.6 MMOL/L (ref 3.5–5.1)
PROT SERPL-MCNC: 7.8 GM/DL (ref 6–8.4)
RBC # BLD AUTO: 4.45 M/UL (ref 4.6–6.2)
SODIUM SERPL-SCNC: 140 MMOL/L (ref 136–145)
WBC # BLD AUTO: 5.7 K/UL (ref 3.9–12.7)

## 2025-05-30 PROCEDURE — 96413 CHEMO IV INFUSION 1 HR: CPT | Mod: PN

## 2025-05-30 PROCEDURE — 96415 CHEMO IV INFUSION ADDL HR: CPT | Mod: PN

## 2025-05-30 PROCEDURE — 84075 ASSAY ALKALINE PHOSPHATASE: CPT | Mod: PN

## 2025-05-30 PROCEDURE — 36415 COLL VENOUS BLD VENIPUNCTURE: CPT | Mod: PN

## 2025-05-30 PROCEDURE — 85027 COMPLETE CBC AUTOMATED: CPT | Mod: PN

## 2025-05-30 PROCEDURE — A4216 STERILE WATER/SALINE, 10 ML: HCPCS | Mod: PN | Performed by: INTERNAL MEDICINE

## 2025-05-30 PROCEDURE — 96367 TX/PROPH/DG ADDL SEQ IV INF: CPT | Mod: PN

## 2025-05-30 PROCEDURE — 25000003 PHARM REV CODE 250: Mod: PN | Performed by: INTERNAL MEDICINE

## 2025-05-30 PROCEDURE — 63600175 PHARM REV CODE 636 W HCPCS: Mod: PN | Performed by: INTERNAL MEDICINE

## 2025-05-30 RX ORDER — SODIUM CHLORIDE 0.9 % (FLUSH) 0.9 %
10 SYRINGE (ML) INJECTION
Status: DISCONTINUED | OUTPATIENT
Start: 2025-05-30 | End: 2025-05-30 | Stop reason: HOSPADM

## 2025-05-30 RX ADMIN — DEXAMETHASONE SODIUM PHOSPHATE 0.25 MG: 10 INJECTION, SOLUTION INTRAMUSCULAR; INTRAVENOUS at 11:05

## 2025-05-30 RX ADMIN — OXALIPLATIN 300 MG: 5 INJECTION, SOLUTION INTRAVENOUS at 12:05

## 2025-05-30 RX ADMIN — Medication 10 ML: at 02:05

## 2025-05-30 RX ADMIN — DEXTROSE MONOHYDRATE: 5 INJECTION, SOLUTION INTRAVENOUS at 11:05

## 2025-05-30 NOTE — PROGRESS NOTES
LCSW met with patient at the chairside during infusion. The patient reported he is happy because today is his last infusion. RN confirmed that he will be ringing the bell this afternoon around 2:30. Patient denied any emotional or practical needs at this time.  encouraged the patient to reach out if any needs arise.

## 2025-05-30 NOTE — PROGRESS NOTES
Oncology Nutrition   Chemotherapy Infusion Visit    Nutrition Follow Up   RD met patient today during chemo infusion today. He reports good appetite and no issues or concerns. He does have diarrhea after chemotherapy usually. He was sent to ED on 5/19/25 and positive for C.Diff. RD discussed low fiber and advancing diet slowly. He said he is back to eating normal foods. However he still has diarrhea on and off and has to take antibiotics.   RD discussed gut microbiome repair will take time. Bacteria imbalance is caused from chemotherapy and antibiotic use. RD provided Enterade samples and suggested consume 1 before meals BID. This product will support rebuilding of gut villi and hydrates. RD also provided samples of Banatrol Plus - prebiotic powder to add to water or liquids. Explained this on is more for C.Diff but will help build good bacteria. He can use either one of these but not both at same time. He and his wife understood.     Wt Readings from Last 10 Encounters:   05/30/25 99.2 kg (218 lb 11.1 oz)   05/28/25 104.5 kg (230 lb 6.1 oz)   05/20/25 100.9 kg (222 lb 7.1 oz)   05/19/25 102.7 kg (226 lb 6.6 oz)   05/02/25 104.5 kg (230 lb 6.1 oz)   04/24/25 104.5 kg (230 lb 6.4 oz)   04/04/25 105.5 kg (232 lb 9.4 oz)   04/03/25 105.9 kg (233 lb 7.5 oz)   03/14/25 106.5 kg (234 lb 12.6 oz)   03/13/25 109.3 kg (240 lb 15.4 oz)     All other nutrition questions/concerns addressed as appropriate.    Kacy Joseph, WILLIAMN, LDN, CNSC  05/30/2025  3:17 PM

## 2025-05-30 NOTE — PLAN OF CARE
.Pt tolerated oxalipatin infusion well.  No adverse reaction noted.   Port flushed with Ns and de-accessed per protocol.  Patient left clinic in no acute distress.

## 2025-06-03 ENCOUNTER — PATIENT MESSAGE (OUTPATIENT)
Dept: HEMATOLOGY/ONCOLOGY | Facility: CLINIC | Age: 45
End: 2025-06-03
Payer: COMMERCIAL

## 2025-06-03 ENCOUNTER — TELEPHONE (OUTPATIENT)
Dept: HEMATOLOGY/ONCOLOGY | Facility: CLINIC | Age: 45
End: 2025-06-03
Payer: COMMERCIAL

## 2025-06-24 ENCOUNTER — TELEPHONE (OUTPATIENT)
Dept: HEMATOLOGY/ONCOLOGY | Facility: CLINIC | Age: 45
End: 2025-06-24
Payer: COMMERCIAL

## 2025-06-24 NOTE — TELEPHONE ENCOUNTER
Copied from CRM #[id number]. Topic: General Inquiry - Patient Advice  >> Jun 24, 2025 11:53 AM Abbe wrote:  Type: Needs Medical Advice  Who Called:  Janet/JLUIS    Best Call Back Number: [phone]   Additional Information: Called to speak with pj/Leila regarding pt, following up regarding pt medication.

## 2025-06-27 ENCOUNTER — TELEPHONE (OUTPATIENT)
Dept: PHARMACY | Facility: CLINIC | Age: 45
End: 2025-06-27
Payer: COMMERCIAL

## 2025-06-27 NOTE — TELEPHONE ENCOUNTER
[facility]/Population Health Chart Review & Patient Outreach Details For Medication Adherence Project    Reason for Outreach Encounter: 3rd Party payor non-compliance report ([employer], [employer], [employer], etc)  2.  Patient Outreach Method: Reviewed patient chart   3.   Medication in question:                 4.  Reviewed and or Updates Made To: Patient Chart  5. Outreach Outcomes and/or actions taken: Patient filled medication and is on track to be adherent  Additional Notes:

## 2025-07-23 PROBLEM — J84.10 GRANULOMATOUS LUNG DISEASE: Status: ACTIVE | Noted: 2025-07-23

## 2025-08-18 ENCOUNTER — HOSPITAL ENCOUNTER (OUTPATIENT)
Dept: RADIOLOGY | Facility: HOSPITAL | Age: 45
Discharge: HOME | End: 2025-08-18
Attending: INTERNAL MEDICINE
Payer: COMMERCIAL

## 2025-08-18 DIAGNOSIS — C18.7 MALIGNANT NEOPLASM OF SIGMOID COLON: ICD-10-CM

## 2025-08-18 PROCEDURE — 71260 CT THORAX DX C+: CPT | Mod: 26,,, | Performed by: RADIOLOGY

## 2025-08-18 PROCEDURE — 74177 CT ABD & PELVIS W/CONTRAST: CPT | Mod: 26,,, | Performed by: RADIOLOGY

## 2025-08-18 PROCEDURE — 25500020 PHARM REV CODE 255: Performed by: INTERNAL MEDICINE

## 2025-08-18 PROCEDURE — A9698 NON-RAD CONTRAST MATERIALNOC: HCPCS | Performed by: INTERNAL MEDICINE

## 2025-08-18 PROCEDURE — 71260 CT THORAX DX C+: CPT | Mod: TC

## 2025-08-18 RX ADMIN — IOHEXOL 100 ML: 350 INJECTION, SOLUTION INTRAVENOUS at 01:08

## 2025-08-18 RX ADMIN — BARIUM SULFATE 450 ML: 20 SUSPENSION ORAL at 01:08

## 2025-08-20 ENCOUNTER — OFFICE VISIT (OUTPATIENT)
Dept: HEMATOLOGY/ONCOLOGY | Facility: CLINIC | Age: 45
End: 2025-08-20
Payer: COMMERCIAL

## 2025-08-20 VITALS
TEMPERATURE: 98 F | OXYGEN SATURATION: 100 % | HEIGHT: 74 IN | WEIGHT: 233.94 LBS | SYSTOLIC BLOOD PRESSURE: 134 MMHG | DIASTOLIC BLOOD PRESSURE: 86 MMHG | HEART RATE: 69 BPM | BODY MASS INDEX: 30.02 KG/M2

## 2025-08-20 DIAGNOSIS — G62.0 CHEMOTHERAPY-INDUCED NEUROPATHY: ICD-10-CM

## 2025-08-20 DIAGNOSIS — Z85.038 HISTORY OF COLON CANCER: Primary | ICD-10-CM

## 2025-08-20 DIAGNOSIS — T45.1X5A CHEMOTHERAPY-INDUCED NEUROPATHY: ICD-10-CM

## 2025-08-20 PROBLEM — Z79.69 ON ANTINEOPLASTIC CHEMOTHERAPY: Status: RESOLVED | Noted: 2025-05-20 | Resolved: 2025-08-20

## 2025-08-20 PROBLEM — C77.9 SECONDARY ADENOCARCINOMA OF LYMPH NODE: Status: RESOLVED | Noted: 2025-02-11 | Resolved: 2025-08-20

## 2025-08-20 PROBLEM — Z79.69 IMMUNODEFICIENCY DUE TO CHEMOTHERAPY: Status: RESOLVED | Noted: 2025-03-13 | Resolved: 2025-08-20

## 2025-08-20 PROBLEM — D84.821 IMMUNODEFICIENCY DUE TO CHEMOTHERAPY: Status: RESOLVED | Noted: 2025-03-13 | Resolved: 2025-08-20

## 2025-08-20 PROCEDURE — 99999 PR PBB SHADOW E&M-EST. PATIENT-LVL V: CPT | Mod: PBBFAC,,, | Performed by: INTERNAL MEDICINE

## 2025-08-21 ENCOUNTER — TELEPHONE (OUTPATIENT)
Dept: HEMATOLOGY/ONCOLOGY | Facility: CLINIC | Age: 45
End: 2025-08-21
Payer: COMMERCIAL

## 2025-08-21 ENCOUNTER — PATIENT MESSAGE (OUTPATIENT)
Dept: HEMATOLOGY/ONCOLOGY | Facility: CLINIC | Age: 45
End: 2025-08-21
Payer: COMMERCIAL

## 2025-08-21 DIAGNOSIS — M54.59 OTHER LOW BACK PAIN: Primary | ICD-10-CM

## 2025-08-21 DIAGNOSIS — T45.1X5A NEUROPATHY DUE TO CHEMOTHERAPEUTIC DRUG: ICD-10-CM

## 2025-08-21 DIAGNOSIS — G62.0 NEUROPATHY DUE TO CHEMOTHERAPEUTIC DRUG: ICD-10-CM

## 2025-08-28 ENCOUNTER — OFFICE VISIT (OUTPATIENT)
Dept: PAIN MEDICINE | Facility: CLINIC | Age: 45
End: 2025-08-28
Payer: COMMERCIAL

## 2025-08-28 VITALS
DIASTOLIC BLOOD PRESSURE: 91 MMHG | HEART RATE: 77 BPM | WEIGHT: 240.5 LBS | SYSTOLIC BLOOD PRESSURE: 136 MMHG | BODY MASS INDEX: 30.88 KG/M2

## 2025-08-28 DIAGNOSIS — C18.7 MALIGNANT NEOPLASM OF SIGMOID COLON: ICD-10-CM

## 2025-08-28 DIAGNOSIS — C18.7 ADENOCARCINOMA OF SIGMOID COLON: ICD-10-CM

## 2025-08-28 DIAGNOSIS — M54.16 LUMBAR RADICULOPATHY: ICD-10-CM

## 2025-08-28 DIAGNOSIS — Z79.891 OPIOID CONTRACT EXISTS: Primary | ICD-10-CM

## 2025-08-28 DIAGNOSIS — M54.16 LUMBAR RADICULOPATHY: Primary | ICD-10-CM

## 2025-08-28 DIAGNOSIS — K52.9 COLITIS: ICD-10-CM

## 2025-08-28 DIAGNOSIS — M47.816 LUMBAR SPONDYLOSIS: ICD-10-CM

## 2025-08-28 DIAGNOSIS — M48.062 SPINAL STENOSIS OF LUMBAR REGION WITH NEUROGENIC CLAUDICATION: ICD-10-CM

## 2025-08-28 DIAGNOSIS — A04.72 C. DIFFICILE COLITIS: ICD-10-CM

## 2025-08-28 PROCEDURE — 99214 OFFICE O/P EST MOD 30 MIN: CPT | Mod: S$GLB,,,

## 2025-08-28 PROCEDURE — 1159F MED LIST DOCD IN RCRD: CPT | Mod: CPTII,S$GLB,,

## 2025-08-28 PROCEDURE — 3080F DIAST BP >= 90 MM HG: CPT | Mod: CPTII,S$GLB,,

## 2025-08-28 PROCEDURE — 3008F BODY MASS INDEX DOCD: CPT | Mod: CPTII,S$GLB,,

## 2025-08-28 PROCEDURE — 99999 PR PBB SHADOW E&M-EST. PATIENT-LVL III: CPT | Mod: PBBFAC,,,

## 2025-08-28 PROCEDURE — 3075F SYST BP GE 130 - 139MM HG: CPT | Mod: CPTII,S$GLB,,

## 2025-08-28 RX ORDER — HYDROCODONE BITARTRATE AND ACETAMINOPHEN 7.5; 325 MG/1; MG/1
1 TABLET ORAL EVERY 8 HOURS PRN
Qty: 90 TABLET | Refills: 0 | Status: SHIPPED | OUTPATIENT
Start: 2025-10-11 | End: 2025-11-10

## 2025-08-28 RX ORDER — HYDROCODONE BITARTRATE AND ACETAMINOPHEN 7.5; 325 MG/1; MG/1
1 TABLET ORAL EVERY 8 HOURS PRN
Qty: 90 TABLET | Refills: 0 | Status: SHIPPED | OUTPATIENT
Start: 2025-11-10 | End: 2025-12-10

## 2025-08-28 RX ORDER — PREGABALIN 100 MG/1
100 CAPSULE ORAL 2 TIMES DAILY
Qty: 60 CAPSULE | Refills: 1 | Status: SHIPPED | OUTPATIENT
Start: 2025-08-28 | End: 2025-09-27

## 2025-08-28 RX ORDER — HYDROCODONE BITARTRATE AND ACETAMINOPHEN 7.5; 325 MG/1; MG/1
1 TABLET ORAL EVERY 8 HOURS PRN
Qty: 90 TABLET | Refills: 0 | Status: SHIPPED | OUTPATIENT
Start: 2025-09-11 | End: 2025-10-11

## 2025-08-29 ENCOUNTER — PATIENT MESSAGE (OUTPATIENT)
Dept: INTERNAL MEDICINE | Facility: CLINIC | Age: 45
End: 2025-08-29
Payer: COMMERCIAL

## 2025-09-02 ENCOUNTER — TELEPHONE (OUTPATIENT)
Dept: HEMATOLOGY/ONCOLOGY | Facility: CLINIC | Age: 45
End: 2025-09-02
Payer: COMMERCIAL

## 2025-09-03 ENCOUNTER — OFFICE VISIT (OUTPATIENT)
Dept: HEMATOLOGY/ONCOLOGY | Facility: CLINIC | Age: 45
End: 2025-09-03
Payer: COMMERCIAL

## 2025-09-03 DIAGNOSIS — G62.0 CHEMOTHERAPY-INDUCED NEUROPATHY: Primary | ICD-10-CM

## 2025-09-03 DIAGNOSIS — R53.83 FATIGUE, UNSPECIFIED TYPE: ICD-10-CM

## 2025-09-03 DIAGNOSIS — F41.9 ANXIETY: ICD-10-CM

## 2025-09-03 DIAGNOSIS — Z85.038 HISTORY OF COLON CANCER: ICD-10-CM

## 2025-09-03 DIAGNOSIS — T45.1X5A CHEMOTHERAPY-INDUCED NEUROPATHY: Primary | ICD-10-CM

## 2025-09-03 DIAGNOSIS — F43.21 ADJUSTMENT REACTION, DEPRESSIVE, BRIEF: ICD-10-CM

## 2025-09-03 DIAGNOSIS — M54.59 OTHER LOW BACK PAIN: ICD-10-CM

## 2025-09-03 PROCEDURE — 98007 SYNCH AUDIO-VIDEO EST HI 40: CPT | Mod: 95,,, | Performed by: NURSE PRACTITIONER

## 2025-09-03 PROCEDURE — 1159F MED LIST DOCD IN RCRD: CPT | Mod: CPTII,95,, | Performed by: NURSE PRACTITIONER

## 2025-09-03 PROCEDURE — 1160F RVW MEDS BY RX/DR IN RCRD: CPT | Mod: CPTII,95,, | Performed by: NURSE PRACTITIONER

## 2025-09-04 ENCOUNTER — TELEPHONE (OUTPATIENT)
Dept: HEMATOLOGY/ONCOLOGY | Facility: CLINIC | Age: 45
End: 2025-09-04
Payer: COMMERCIAL

## 2025-09-04 PROBLEM — F43.21 ADJUSTMENT REACTION, DEPRESSIVE, BRIEF: Status: ACTIVE | Noted: 2025-09-04

## 2025-09-04 PROBLEM — R53.83 FATIGUE: Status: ACTIVE | Noted: 2025-09-04

## 2025-09-04 PROBLEM — M54.59 OTHER LOW BACK PAIN: Status: ACTIVE | Noted: 2025-09-04

## 2025-09-04 RX ORDER — PERPHENAZINE 16 MG
600 TABLET ORAL DAILY
Start: 2025-09-04

## (undated) DEVICE — APPLICATOR CHLORAPREP CLR 10.5

## (undated) DEVICE — TRAY NERVE BLOCK

## (undated) DEVICE — GLOVE SURGICAL LATEX SZ 7

## (undated) DEVICE — NDL TUOHY EPIDURAL 20G X 3.5

## (undated) DEVICE — NDL SPINAL SPINOCAN 22GX3.5

## (undated) DEVICE — MARKER SKIN STND TIP BLUE BARR

## (undated) DEVICE — TOWEL OR DISP STRL BLUE 4/PK

## (undated) DEVICE — SYR GLASS 5CC LUER LOK